# Patient Record
Sex: FEMALE | Race: WHITE | Employment: OTHER | URBAN - METROPOLITAN AREA
[De-identification: names, ages, dates, MRNs, and addresses within clinical notes are randomized per-mention and may not be internally consistent; named-entity substitution may affect disease eponyms.]

---

## 2017-03-16 PROBLEM — M79.604 PAIN OF RIGHT LOWER EXTREMITY: Status: ACTIVE | Noted: 2017-03-16

## 2017-03-16 PROBLEM — M17.0 PRIMARY OSTEOARTHRITIS OF BOTH KNEES: Status: ACTIVE | Noted: 2017-03-16

## 2017-03-16 PROBLEM — S70.11XA HEMATOMA OF RIGHT THIGH: Status: ACTIVE | Noted: 2017-03-16

## 2017-03-16 PROBLEM — R52 UNCONTROLLED PAIN: Status: ACTIVE | Noted: 2017-03-16

## 2019-10-25 ENCOUNTER — HOSPITAL ENCOUNTER (EMERGENCY)
Age: 79
Discharge: HOME | End: 2019-10-25
Attending: EMERGENCY MEDICINE
Payer: MEDICARE

## 2019-10-25 ENCOUNTER — APPOINTMENT (OUTPATIENT)
Dept: CT IMAGING | Age: 79
End: 2019-10-25
Payer: MEDICARE

## 2019-10-25 ENCOUNTER — APPOINTMENT (OUTPATIENT)
Dept: GENERAL RADIOLOGY | Age: 79
End: 2019-10-25
Payer: MEDICARE

## 2019-10-25 ENCOUNTER — APPOINTMENT (OUTPATIENT)
Dept: ULTRASOUND IMAGING | Age: 79
End: 2019-10-25
Payer: MEDICARE

## 2019-10-25 VITALS
OXYGEN SATURATION: 98 % | TEMPERATURE: 98.9 F | HEIGHT: 66 IN | HEART RATE: 92 BPM | BODY MASS INDEX: 25.71 KG/M2 | RESPIRATION RATE: 27 BRPM | SYSTOLIC BLOOD PRESSURE: 136 MMHG | WEIGHT: 160 LBS | DIASTOLIC BLOOD PRESSURE: 73 MMHG

## 2019-10-25 DIAGNOSIS — R10.30 LOWER ABDOMINAL PAIN: Primary | ICD-10-CM

## 2019-10-25 LAB
ALBUMIN SERPL-MCNC: 3.8 GM/DL (ref 3.4–5)
ALP BLD-CCNC: 117 IU/L (ref 40–128)
ALT SERPL-CCNC: 18 U/L (ref 10–40)
ANION GAP SERPL CALCULATED.3IONS-SCNC: 12 MMOL/L (ref 4–16)
AST SERPL-CCNC: 34 IU/L (ref 15–37)
BACTERIA: ABNORMAL /HPF
BASOPHILS ABSOLUTE: 0 K/CU MM
BASOPHILS RELATIVE PERCENT: 0.8 % (ref 0–1)
BILIRUB SERPL-MCNC: 1.5 MG/DL (ref 0–1)
BILIRUBIN URINE: NEGATIVE MG/DL
BLOOD, URINE: ABNORMAL
BUN BLDV-MCNC: 6 MG/DL (ref 6–23)
CALCIUM SERPL-MCNC: 9 MG/DL (ref 8.3–10.6)
CHLORIDE BLD-SCNC: 104 MMOL/L (ref 99–110)
CLARITY: ABNORMAL
CO2: 25 MMOL/L (ref 21–32)
COLOR: YELLOW
CREAT SERPL-MCNC: 0.7 MG/DL (ref 0.6–1.1)
DIFFERENTIAL TYPE: ABNORMAL
EOSINOPHILS ABSOLUTE: 0.1 K/CU MM
EOSINOPHILS RELATIVE PERCENT: 3.5 % (ref 0–3)
GFR AFRICAN AMERICAN: >60 ML/MIN/1.73M2
GFR NON-AFRICAN AMERICAN: >60 ML/MIN/1.73M2
GLUCOSE BLD-MCNC: 144 MG/DL (ref 70–99)
GLUCOSE, URINE: NEGATIVE MG/DL
HCT VFR BLD CALC: 39 % (ref 37–47)
HEMOGLOBIN: 12.3 GM/DL (ref 12.5–16)
IMMATURE NEUTROPHIL %: 0.4 % (ref 0–0.43)
KETONES, URINE: NEGATIVE MG/DL
LACTATE: 1.2 MMOL/L (ref 0.4–2)
LEUKOCYTE ESTERASE, URINE: ABNORMAL
LIPASE: 9 IU/L (ref 13–60)
LYMPHOCYTES ABSOLUTE: 0.5 K/CU MM
LYMPHOCYTES RELATIVE PERCENT: 21.3 % (ref 24–44)
MAGNESIUM: 2.2 MG/DL (ref 1.8–2.4)
MCH RBC QN AUTO: 30.1 PG (ref 27–31)
MCHC RBC AUTO-ENTMCNC: 31.5 % (ref 32–36)
MCV RBC AUTO: 95.6 FL (ref 78–100)
MONOCYTES ABSOLUTE: 0.3 K/CU MM
MONOCYTES RELATIVE PERCENT: 13 % (ref 0–4)
MUCUS: ABNORMAL HPF
NITRITE URINE, QUANTITATIVE: NEGATIVE
NUCLEATED RBC %: 0 %
PDW BLD-RTO: 14.7 % (ref 11.7–14.9)
PH, URINE: 7 (ref 5–8)
PLATELET # BLD: 108 K/CU MM (ref 140–440)
PMV BLD AUTO: 9.9 FL (ref 7.5–11.1)
POTASSIUM SERPL-SCNC: 3.2 MMOL/L (ref 3.5–5.1)
PRO-BNP: 593.8 PG/ML
PROTEIN UA: NEGATIVE MG/DL
RBC # BLD: 4.08 M/CU MM (ref 4.2–5.4)
RBC URINE: 5 /HPF (ref 0–6)
SEGMENTED NEUTROPHILS ABSOLUTE COUNT: 1.6 K/CU MM
SEGMENTED NEUTROPHILS RELATIVE PERCENT: 61 % (ref 36–66)
SODIUM BLD-SCNC: 141 MMOL/L (ref 135–145)
SPECIFIC GRAVITY UA: 1 (ref 1–1.03)
TOTAL IMMATURE NEUTOROPHIL: 0.01 K/CU MM
TOTAL NUCLEATED RBC: 0 K/CU MM
TOTAL PROTEIN: 6.6 GM/DL (ref 6.4–8.2)
TRICHOMONAS: ABNORMAL /HPF
TROPONIN T: <0.01 NG/ML
UROBILINOGEN, URINE: NORMAL MG/DL (ref 0.2–1)
WBC # BLD: 2.5 K/CU MM (ref 4–10.5)
WBC CLUMP: ABNORMAL /HPF
WBC UA: 22 /HPF (ref 0–5)

## 2019-10-25 PROCEDURE — 83735 ASSAY OF MAGNESIUM: CPT

## 2019-10-25 PROCEDURE — 76705 ECHO EXAM OF ABDOMEN: CPT

## 2019-10-25 PROCEDURE — 83880 ASSAY OF NATRIURETIC PEPTIDE: CPT

## 2019-10-25 PROCEDURE — 93005 ELECTROCARDIOGRAM TRACING: CPT | Performed by: EMERGENCY MEDICINE

## 2019-10-25 PROCEDURE — 87077 CULTURE AEROBIC IDENTIFY: CPT

## 2019-10-25 PROCEDURE — 2580000003 HC RX 258: Performed by: EMERGENCY MEDICINE

## 2019-10-25 PROCEDURE — 99285 EMERGENCY DEPT VISIT HI MDM: CPT

## 2019-10-25 PROCEDURE — 6360000004 HC RX CONTRAST MEDICATION: Performed by: EMERGENCY MEDICINE

## 2019-10-25 PROCEDURE — 87086 URINE CULTURE/COLONY COUNT: CPT

## 2019-10-25 PROCEDURE — 74177 CT ABD & PELVIS W/CONTRAST: CPT

## 2019-10-25 PROCEDURE — 80053 COMPREHEN METABOLIC PANEL: CPT

## 2019-10-25 PROCEDURE — 85025 COMPLETE CBC W/AUTO DIFF WBC: CPT

## 2019-10-25 PROCEDURE — 84484 ASSAY OF TROPONIN QUANT: CPT

## 2019-10-25 PROCEDURE — 81001 URINALYSIS AUTO W/SCOPE: CPT

## 2019-10-25 PROCEDURE — 93010 ELECTROCARDIOGRAM REPORT: CPT | Performed by: INTERNAL MEDICINE

## 2019-10-25 PROCEDURE — 87186 SC STD MICRODIL/AGAR DIL: CPT

## 2019-10-25 PROCEDURE — 71046 X-RAY EXAM CHEST 2 VIEWS: CPT

## 2019-10-25 PROCEDURE — 83690 ASSAY OF LIPASE: CPT

## 2019-10-25 PROCEDURE — 6370000000 HC RX 637 (ALT 250 FOR IP): Performed by: EMERGENCY MEDICINE

## 2019-10-25 PROCEDURE — 83605 ASSAY OF LACTIC ACID: CPT

## 2019-10-25 RX ORDER — SIMVASTATIN 40 MG
40 TABLET ORAL NIGHTLY
COMMUNITY
End: 2021-08-02

## 2019-10-25 RX ORDER — TRAZODONE HYDROCHLORIDE 50 MG/1
75-100 TABLET ORAL NIGHTLY
Status: ON HOLD | COMMUNITY
End: 2020-02-27 | Stop reason: HOSPADM

## 2019-10-25 RX ORDER — CEPHALEXIN 500 MG/1
500 CAPSULE ORAL 4 TIMES DAILY
Qty: 28 CAPSULE | Refills: 0 | Status: SHIPPED | OUTPATIENT
Start: 2019-10-25 | End: 2019-11-01

## 2019-10-25 RX ORDER — SODIUM CHLORIDE 0.9 % (FLUSH) 0.9 %
10 SYRINGE (ML) INJECTION PRN
Status: DISCONTINUED | OUTPATIENT
Start: 2019-10-25 | End: 2019-10-25 | Stop reason: HOSPADM

## 2019-10-25 RX ORDER — CEPHALEXIN 250 MG/1
500 CAPSULE ORAL ONCE
Status: COMPLETED | OUTPATIENT
Start: 2019-10-25 | End: 2019-10-25

## 2019-10-25 RX ORDER — ALPRAZOLAM 0.25 MG/1
0.5 TABLET ORAL ONCE
Status: COMPLETED | OUTPATIENT
Start: 2019-10-25 | End: 2019-10-25

## 2019-10-25 RX ADMIN — CEPHALEXIN 500 MG: 250 CAPSULE ORAL at 15:41

## 2019-10-25 RX ADMIN — Medication 10 ML: at 11:08

## 2019-10-25 RX ADMIN — ALPRAZOLAM 0.5 MG: 0.25 TABLET ORAL at 13:32

## 2019-10-25 RX ADMIN — IOPAMIDOL 75 ML: 755 INJECTION, SOLUTION INTRAVENOUS at 11:08

## 2019-10-25 ASSESSMENT — PAIN DESCRIPTION - PAIN TYPE: TYPE: ACUTE PAIN

## 2019-10-25 ASSESSMENT — PAIN SCALES - GENERAL: PAINLEVEL_OUTOF10: 6

## 2019-10-25 ASSESSMENT — PAIN DESCRIPTION - LOCATION: LOCATION: ABDOMEN

## 2019-10-27 LAB
CULTURE: ABNORMAL
Lab: ABNORMAL
SPECIMEN: ABNORMAL
TOTAL COLONY COUNT: ABNORMAL

## 2019-10-29 LAB
EKG ATRIAL RATE: 66 BPM
EKG DIAGNOSIS: NORMAL
EKG P AXIS: -3 DEGREES
EKG P-R INTERVAL: 140 MS
EKG Q-T INTERVAL: 446 MS
EKG QRS DURATION: 92 MS
EKG QTC CALCULATION (BAZETT): 467 MS
EKG R AXIS: 25 DEGREES
EKG T AXIS: 72 DEGREES
EKG VENTRICULAR RATE: 66 BPM

## 2020-02-21 ENCOUNTER — APPOINTMENT (OUTPATIENT)
Dept: CT IMAGING | Age: 80
DRG: 392 | End: 2020-02-21
Payer: MEDICARE

## 2020-02-21 ENCOUNTER — APPOINTMENT (OUTPATIENT)
Dept: GENERAL RADIOLOGY | Age: 80
DRG: 392 | End: 2020-02-21
Payer: MEDICARE

## 2020-02-21 ENCOUNTER — APPOINTMENT (OUTPATIENT)
Dept: ULTRASOUND IMAGING | Age: 80
DRG: 392 | End: 2020-02-21
Payer: MEDICARE

## 2020-02-21 ENCOUNTER — HOSPITAL ENCOUNTER (INPATIENT)
Age: 80
LOS: 3 days | Discharge: HOME HEALTH | DRG: 392 | End: 2020-02-27
Attending: HOSPITALIST | Admitting: HOSPITALIST
Payer: MEDICARE

## 2020-02-21 PROBLEM — R07.9 CHEST PAIN: Status: ACTIVE | Noted: 2020-02-21

## 2020-02-21 LAB
ALBUMIN SERPL-MCNC: 3.3 GM/DL (ref 3.4–5)
ALP BLD-CCNC: 176 IU/L (ref 40–129)
ALT SERPL-CCNC: 13 U/L (ref 10–40)
ANION GAP SERPL CALCULATED.3IONS-SCNC: 9 MMOL/L (ref 4–16)
AST SERPL-CCNC: 26 IU/L (ref 15–37)
BACTERIA: NEGATIVE /HPF
BASOPHILS ABSOLUTE: 0 K/CU MM
BASOPHILS RELATIVE PERCENT: 0.5 % (ref 0–1)
BILIRUB SERPL-MCNC: 0.7 MG/DL (ref 0–1)
BILIRUBIN URINE: NEGATIVE MG/DL
BLOOD, URINE: NEGATIVE
BUN BLDV-MCNC: 11 MG/DL (ref 6–23)
CALCIUM SERPL-MCNC: 8.5 MG/DL (ref 8.3–10.6)
CHLORIDE BLD-SCNC: 99 MMOL/L (ref 99–110)
CLARITY: CLEAR
CO2: 27 MMOL/L (ref 21–32)
COLOR: YELLOW
CREAT SERPL-MCNC: 0.9 MG/DL (ref 0.6–1.1)
DIFFERENTIAL TYPE: ABNORMAL
EKG ATRIAL RATE: 62 BPM
EKG DIAGNOSIS: NORMAL
EKG P AXIS: 34 DEGREES
EKG P-R INTERVAL: 184 MS
EKG Q-T INTERVAL: 450 MS
EKG QRS DURATION: 92 MS
EKG QTC CALCULATION (BAZETT): 456 MS
EKG R AXIS: -13 DEGREES
EKG T AXIS: 42 DEGREES
EKG VENTRICULAR RATE: 62 BPM
EOSINOPHILS ABSOLUTE: 0.1 K/CU MM
EOSINOPHILS RELATIVE PERCENT: 2.8 % (ref 0–3)
GFR AFRICAN AMERICAN: >60 ML/MIN/1.73M2
GFR NON-AFRICAN AMERICAN: >60 ML/MIN/1.73M2
GLUCOSE BLD-MCNC: 107 MG/DL (ref 70–99)
GLUCOSE BLD-MCNC: 99 MG/DL (ref 70–99)
GLUCOSE, URINE: NEGATIVE MG/DL
HCT VFR BLD CALC: 36.5 % (ref 37–47)
HEMOGLOBIN: 11.7 GM/DL (ref 12.5–16)
IMMATURE NEUTROPHIL %: 0.5 % (ref 0–0.43)
KETONES, URINE: NEGATIVE MG/DL
LEUKOCYTE ESTERASE, URINE: ABNORMAL
LIPASE: 12 IU/L (ref 13–60)
LYMPHOCYTES ABSOLUTE: 0.5 K/CU MM
LYMPHOCYTES RELATIVE PERCENT: 21.7 % (ref 24–44)
MAGNESIUM: 2.1 MG/DL (ref 1.8–2.4)
MCH RBC QN AUTO: 29.7 PG (ref 27–31)
MCHC RBC AUTO-ENTMCNC: 32.1 % (ref 32–36)
MCV RBC AUTO: 92.6 FL (ref 78–100)
MONOCYTES ABSOLUTE: 0.3 K/CU MM
MONOCYTES RELATIVE PERCENT: 11.8 % (ref 0–4)
NITRITE URINE, QUANTITATIVE: NEGATIVE
NON SQUAM EPI CELLS: <1 /HPF
NUCLEATED RBC %: 0 %
PDW BLD-RTO: 14.3 % (ref 11.7–14.9)
PH, URINE: 7 (ref 5–8)
PLATELET # BLD: 112 K/CU MM (ref 140–440)
PMV BLD AUTO: 9.6 FL (ref 7.5–11.1)
POTASSIUM SERPL-SCNC: 3.6 MMOL/L (ref 3.5–5.1)
PRO-BNP: 511.3 PG/ML
PROTEIN UA: NEGATIVE MG/DL
RBC # BLD: 3.94 M/CU MM (ref 4.2–5.4)
RBC URINE: <1 /HPF (ref 0–6)
SEGMENTED NEUTROPHILS ABSOLUTE COUNT: 1.3 K/CU MM
SEGMENTED NEUTROPHILS RELATIVE PERCENT: 62.7 % (ref 36–66)
SODIUM BLD-SCNC: 135 MMOL/L (ref 135–145)
SPECIFIC GRAVITY UA: 1.01 (ref 1–1.03)
SQUAMOUS EPITHELIAL: <1 /HPF
TOTAL IMMATURE NEUTOROPHIL: 0.01 K/CU MM
TOTAL NUCLEATED RBC: 0 K/CU MM
TOTAL PROTEIN: 6 GM/DL (ref 6.4–8.2)
TRICHOMONAS: ABNORMAL /HPF
TROPONIN T: <0.01 NG/ML
TROPONIN T: <0.01 NG/ML
UROBILINOGEN, URINE: 1 MG/DL (ref 0.2–1)
WBC # BLD: 2.1 K/CU MM (ref 4–10.5)
WBC UA: 1 /HPF (ref 0–5)

## 2020-02-21 PROCEDURE — 71045 X-RAY EXAM CHEST 1 VIEW: CPT

## 2020-02-21 PROCEDURE — 85025 COMPLETE CBC W/AUTO DIFF WBC: CPT

## 2020-02-21 PROCEDURE — 83735 ASSAY OF MAGNESIUM: CPT

## 2020-02-21 PROCEDURE — 83690 ASSAY OF LIPASE: CPT

## 2020-02-21 PROCEDURE — G0378 HOSPITAL OBSERVATION PER HR: HCPCS

## 2020-02-21 PROCEDURE — 76705 ECHO EXAM OF ABDOMEN: CPT

## 2020-02-21 PROCEDURE — 96375 TX/PRO/DX INJ NEW DRUG ADDON: CPT

## 2020-02-21 PROCEDURE — 6360000002 HC RX W HCPCS: Performed by: PHYSICIAN ASSISTANT

## 2020-02-21 PROCEDURE — 83880 ASSAY OF NATRIURETIC PEPTIDE: CPT

## 2020-02-21 PROCEDURE — 76376 3D RENDER W/INTRP POSTPROCES: CPT

## 2020-02-21 PROCEDURE — 2580000003 HC RX 258: Performed by: PHYSICIAN ASSISTANT

## 2020-02-21 PROCEDURE — 6360000002 HC RX W HCPCS: Performed by: NURSE PRACTITIONER

## 2020-02-21 PROCEDURE — 82962 GLUCOSE BLOOD TEST: CPT

## 2020-02-21 PROCEDURE — 96372 THER/PROPH/DIAG INJ SC/IM: CPT

## 2020-02-21 PROCEDURE — 6370000000 HC RX 637 (ALT 250 FOR IP): Performed by: NURSE PRACTITIONER

## 2020-02-21 PROCEDURE — 80053 COMPREHEN METABOLIC PANEL: CPT

## 2020-02-21 PROCEDURE — 99285 EMERGENCY DEPT VISIT HI MDM: CPT

## 2020-02-21 PROCEDURE — 70450 CT HEAD/BRAIN W/O DYE: CPT

## 2020-02-21 PROCEDURE — 6360000004 HC RX CONTRAST MEDICATION: Performed by: PHYSICIAN ASSISTANT

## 2020-02-21 PROCEDURE — 84484 ASSAY OF TROPONIN QUANT: CPT

## 2020-02-21 PROCEDURE — 2500000003 HC RX 250 WO HCPCS: Performed by: PHYSICIAN ASSISTANT

## 2020-02-21 PROCEDURE — 36415 COLL VENOUS BLD VENIPUNCTURE: CPT

## 2020-02-21 PROCEDURE — 74177 CT ABD & PELVIS W/CONTRAST: CPT

## 2020-02-21 PROCEDURE — 81001 URINALYSIS AUTO W/SCOPE: CPT

## 2020-02-21 PROCEDURE — 2580000003 HC RX 258: Performed by: NURSE PRACTITIONER

## 2020-02-21 PROCEDURE — 96374 THER/PROPH/DIAG INJ IV PUSH: CPT

## 2020-02-21 RX ORDER — POLYETHYLENE GLYCOL 3350 17 G/17G
17 POWDER, FOR SOLUTION ORAL DAILY PRN
Status: DISCONTINUED | OUTPATIENT
Start: 2020-02-21 | End: 2020-02-27 | Stop reason: HOSPADM

## 2020-02-21 RX ORDER — ATORVASTATIN CALCIUM 20 MG/1
20 TABLET, FILM COATED ORAL NIGHTLY
Status: DISCONTINUED | OUTPATIENT
Start: 2020-02-21 | End: 2020-02-27 | Stop reason: HOSPADM

## 2020-02-21 RX ORDER — 0.9 % SODIUM CHLORIDE 0.9 %
500 INTRAVENOUS SOLUTION INTRAVENOUS ONCE
Status: COMPLETED | OUTPATIENT
Start: 2020-02-21 | End: 2020-02-21

## 2020-02-21 RX ORDER — BUSPIRONE HYDROCHLORIDE 15 MG/1
15 TABLET ORAL 2 TIMES DAILY
Status: DISCONTINUED | OUTPATIENT
Start: 2020-02-21 | End: 2020-02-26

## 2020-02-21 RX ORDER — ALPRAZOLAM 0.5 MG/1
0.5 TABLET ORAL 3 TIMES DAILY PRN
Status: DISCONTINUED | OUTPATIENT
Start: 2020-02-21 | End: 2020-02-26

## 2020-02-21 RX ORDER — ASPIRIN 81 MG/1
81 TABLET ORAL DAILY
Status: DISCONTINUED | OUTPATIENT
Start: 2020-02-21 | End: 2020-02-27 | Stop reason: HOSPADM

## 2020-02-21 RX ORDER — SODIUM CHLORIDE 0.9 % (FLUSH) 0.9 %
10 SYRINGE (ML) INJECTION EVERY 12 HOURS SCHEDULED
Status: DISCONTINUED | OUTPATIENT
Start: 2020-02-21 | End: 2020-02-26 | Stop reason: SDUPTHER

## 2020-02-21 RX ORDER — LOSARTAN POTASSIUM 50 MG/1
50 TABLET ORAL DAILY
Status: DISCONTINUED | OUTPATIENT
Start: 2020-02-21 | End: 2020-02-27 | Stop reason: HOSPADM

## 2020-02-21 RX ORDER — DEXTROSE MONOHYDRATE 25 G/50ML
12.5 INJECTION, SOLUTION INTRAVENOUS PRN
Status: DISCONTINUED | OUTPATIENT
Start: 2020-02-21 | End: 2020-02-27 | Stop reason: HOSPADM

## 2020-02-21 RX ORDER — PROMETHAZINE HYDROCHLORIDE 25 MG/1
12.5 TABLET ORAL EVERY 6 HOURS PRN
Status: DISCONTINUED | OUTPATIENT
Start: 2020-02-21 | End: 2020-02-27 | Stop reason: HOSPADM

## 2020-02-21 RX ORDER — NICOTINE POLACRILEX 4 MG
15 LOZENGE BUCCAL PRN
Status: DISCONTINUED | OUTPATIENT
Start: 2020-02-21 | End: 2020-02-27 | Stop reason: HOSPADM

## 2020-02-21 RX ORDER — SODIUM CHLORIDE 0.9 % (FLUSH) 0.9 %
10 SYRINGE (ML) INJECTION
Status: COMPLETED | OUTPATIENT
Start: 2020-02-21 | End: 2020-02-21

## 2020-02-21 RX ORDER — ACETAMINOPHEN 325 MG/1
650 TABLET ORAL EVERY 6 HOURS PRN
Status: DISCONTINUED | OUTPATIENT
Start: 2020-02-21 | End: 2020-02-26 | Stop reason: SDUPTHER

## 2020-02-21 RX ORDER — TRAZODONE HYDROCHLORIDE 50 MG/1
100 TABLET ORAL NIGHTLY
Status: DISCONTINUED | OUTPATIENT
Start: 2020-02-21 | End: 2020-02-26

## 2020-02-21 RX ORDER — ACETAMINOPHEN 650 MG/1
650 SUPPOSITORY RECTAL EVERY 6 HOURS PRN
Status: DISCONTINUED | OUTPATIENT
Start: 2020-02-21 | End: 2020-02-27 | Stop reason: HOSPADM

## 2020-02-21 RX ORDER — MIRTAZAPINE 15 MG/1
15 TABLET, FILM COATED ORAL NIGHTLY
Status: DISCONTINUED | OUTPATIENT
Start: 2020-02-21 | End: 2020-02-26

## 2020-02-21 RX ORDER — ESCITALOPRAM OXALATE 10 MG/1
20 TABLET ORAL DAILY
Status: DISCONTINUED | OUTPATIENT
Start: 2020-02-21 | End: 2020-02-27 | Stop reason: HOSPADM

## 2020-02-21 RX ORDER — DEXTROSE MONOHYDRATE 50 MG/ML
100 INJECTION, SOLUTION INTRAVENOUS PRN
Status: DISCONTINUED | OUTPATIENT
Start: 2020-02-21 | End: 2020-02-27 | Stop reason: HOSPADM

## 2020-02-21 RX ORDER — ONDANSETRON 2 MG/ML
4 INJECTION INTRAMUSCULAR; INTRAVENOUS EVERY 6 HOURS PRN
Status: DISCONTINUED | OUTPATIENT
Start: 2020-02-21 | End: 2020-02-27 | Stop reason: HOSPADM

## 2020-02-21 RX ORDER — CARVEDILOL 25 MG/1
25 TABLET ORAL 2 TIMES DAILY WITH MEALS
Status: DISCONTINUED | OUTPATIENT
Start: 2020-02-21 | End: 2020-02-27 | Stop reason: HOSPADM

## 2020-02-21 RX ORDER — SODIUM CHLORIDE 0.9 % (FLUSH) 0.9 %
10 SYRINGE (ML) INJECTION PRN
Status: DISCONTINUED | OUTPATIENT
Start: 2020-02-21 | End: 2020-02-26 | Stop reason: SDUPTHER

## 2020-02-21 RX ORDER — OMEPRAZOLE 20 MG/1
20 CAPSULE, DELAYED RELEASE ORAL DAILY
Status: ON HOLD | COMMUNITY
End: 2020-05-27 | Stop reason: HOSPADM

## 2020-02-21 RX ORDER — ONDANSETRON 2 MG/ML
4 INJECTION INTRAMUSCULAR; INTRAVENOUS EVERY 30 MIN PRN
Status: DISCONTINUED | OUTPATIENT
Start: 2020-02-21 | End: 2020-02-27 | Stop reason: HOSPADM

## 2020-02-21 RX ORDER — PANTOPRAZOLE SODIUM 40 MG/1
40 TABLET, DELAYED RELEASE ORAL
Status: DISCONTINUED | OUTPATIENT
Start: 2020-02-22 | End: 2020-02-27 | Stop reason: HOSPADM

## 2020-02-21 RX ADMIN — BUSPIRONE HYDROCHLORIDE 15 MG: 5 TABLET ORAL at 21:40

## 2020-02-21 RX ADMIN — ENOXAPARIN SODIUM 40 MG: 40 INJECTION SUBCUTANEOUS at 21:40

## 2020-02-21 RX ADMIN — SODIUM CHLORIDE, PRESERVATIVE FREE 10 ML: 5 INJECTION INTRAVENOUS at 21:41

## 2020-02-21 RX ADMIN — TRAZODONE HYDROCHLORIDE 100 MG: 50 TABLET ORAL at 21:40

## 2020-02-21 RX ADMIN — ALPRAZOLAM 0.5 MG: 0.5 TABLET ORAL at 21:39

## 2020-02-21 RX ADMIN — CARVEDILOL 25 MG: 25 TABLET, FILM COATED ORAL at 21:39

## 2020-02-21 RX ADMIN — SODIUM CHLORIDE 500 ML: 9 INJECTION, SOLUTION INTRAVENOUS at 16:08

## 2020-02-21 RX ADMIN — ONDANSETRON 4 MG: 2 INJECTION INTRAMUSCULAR; INTRAVENOUS at 16:08

## 2020-02-21 RX ADMIN — FAMOTIDINE 20 MG: 10 INJECTION, SOLUTION INTRAVENOUS at 16:08

## 2020-02-21 RX ADMIN — Medication 10 ML: at 16:05

## 2020-02-21 RX ADMIN — MIRTAZAPINE 15 MG: 15 TABLET, FILM COATED ORAL at 21:39

## 2020-02-21 RX ADMIN — ATORVASTATIN CALCIUM 20 MG: 20 TABLET, FILM COATED ORAL at 21:40

## 2020-02-21 RX ADMIN — IOPAMIDOL 75 ML: 755 INJECTION, SOLUTION INTRAVENOUS at 16:05

## 2020-02-21 ASSESSMENT — PAIN SCALES - GENERAL: PAINLEVEL_OUTOF10: 0

## 2020-02-21 NOTE — ED NOTES
The patient was visited by a relative and by home health care today, who called the patient's PCP. Her doctor suggested they call 911.      Adelita Laguna RN  02/21/20 7502

## 2020-02-21 NOTE — PROGRESS NOTES
Medication History  [facility]    Patient Name: Rachel Pimentel 1940     Medication history has been completed by: Mahnaz Figueroa CPhT    Source(s) of information: Insurance claims and Patient    Primary Care Physician: No primary care provider on file. Pharmacy: [facility]    Allergies as of 02/21/2020 - Review Complete 02/21/2020   Allergen Reaction Noted    Chocolate Other (See Comments) 04/18/2016    Nuts [peanut-containing drug products] Other (See Comments) 04/18/2016        Prior to Admission medications    Medication Sig Start Date End Date Taking? Authorizing Provider   omeprazole (PRILOSEC) 20 MG delayed release capsule Take 20 mg by mouth daily    Historical Provider, MD   traZODone (DESYREL) 50 MG tablet Take  mg by mouth nightly     Historical Provider, MD   ALPRAZolam (XANAX) 0.5 MG tablet Take 0.5 mg by mouth 3 times daily as needed for Anxiety. Historical Provider, MD   escitalopram (LEXAPRO) 20 MG tablet Take 20 mg by mouth daily    Historical Provider, MD   aspirin 81 MG EC tablet Take 81 mg by mouth daily    Historical Provider, MD       Medications added or changed (ex.  new medication, dosage change, interval change, formulation change):  Trazodone dose changed to  mg hs from 50 mg hs per claims  Omeprazole new medication per claims    Medications removed from list (include reason, ex. noncompliance, medication cost, therapy complete etc.):   Buspar no recent claims  Coreg no recent claims  Farxiga no claims  Losartan no recent claims  Mirtazapine no recent claims  Simvastatin no recent claims  Janumet XR no recent claims    Comments:  Patient is a poor historian and no family at bedside  Patient states she thinks she takes about 3 different medications daily  Entered last fill dates for all medications    To my knowledge the above medication history is accurate as of 2/21/2020 5:43 PM.   Mahnaz Figueroa CPhT   2/21/2020 5:43 PM

## 2020-02-21 NOTE — ED NOTES
Ambulated patient in the cantu. Patient stated she felt dizzy while walking.       Rosario Rossi RN  02/21/20 [id number]

## 2020-02-21 NOTE — ED PROVIDER NOTES
EMERGENCY DEPARTMENT ENCOUNTER      PCP: No primary care provider on file. CHIEF COMPLAINT    Chief Complaint   Patient presents with    Shortness of Breath    Irregular Heart Beat       HPI    Angelica Garza is a 78 y.o. female who presents to the emergency department today being sent in from home health nursing for concern of an abnormal heart rhythm. States that had readings of bradycardia at home in the mid 30s. Patient is complaining of epigastrium discomfort radiating into the chest.  Has had intermittent shortness of breath. Ongoing dizziness, which is not new for her. Also states that she been having lower back pain since sustaining a fall last week. She states that she fell backward landing on her back, denies hitting her head. Denies being on blood thinning medication. Patient seen several months ago and had abnormal CT of abdomen pelvis showing possible liver/gallbladder pathology. Patient does admit that she is had some nausea and the epigastric pain associated with food intake. Denies significant cardiac history. No history of blood clots. Not on blood thinning medication    REVIEW OF SYSTEMS    At least 10 systems reviewed and otherwise acutely negative except as in the [address] See HPI and nursing notes for additional information     PAST MEDICAL AND SURGICAL HISTORY    Past Medical History:   Diagnosis Date    Anxiety     Cellulitis     foot.      Constipation     Depression     Diabetes mellitus ([facility]     Hyperlipidemia     Hypertension     Insomnia     Rosacea     Sinus tachycardia      Past Surgical History:   Procedure Laterality Date    BACK SURGERY      HYSTERECTOMY      TOE SURGERY         CURRENT MEDICATIONS    Current Outpatient Rx   Medication Sig Dispense Refill    omeprazole (PRILOSEC) 20 MG delayed release capsule Take 20 mg by mouth daily      simvastatin (ZOCOR) 40 MG tablet Take 40 mg by mouth nightly      traZODone (DESYREL) 50 MG tablet Take  mg by mouth nightly       losartan (COZAAR) 50 MG tablet Take 1 tablet by mouth daily 30 tablet 0    carvedilol (COREG) 25 MG tablet Take 1 tablet by mouth 2 times daily (with meals) 60 tablet 0    mirtazapine (REMERON) 15 MG tablet Take 1 tablet by mouth nightly 30 tablet 0    ALPRAZolam (XANAX) 0.5 MG tablet Take 0.5 mg by mouth 3 times daily as needed for Anxiety.        dapagliflozin (FARXIGA) 10 MG tablet Take 10 mg by mouth every morning      sitaGLIPtin-metFORMIN (JANUMET XR)  MG TB24 tablet Take 1 tablet by mouth 2 times daily      BUSPIRONE HCL PO Take 15 mg by mouth 2 times daily       escitalopram (LEXAPRO) 20 MG tablet Take 20 mg by mouth daily      aspirin 81 MG EC tablet Take 81 mg by mouth daily         ALLERGIES    Allergies   Allergen Reactions    Chocolate Other (See Comments)     Throbbing headache    Nuts [Peanut-Containing Drug Products] Other (See Comments)     \"makes my nose run and like i have a bad cold\"       SOCIAL AND FAMILY HISTORY    Social History     Socioeconomic History    Marital status:      Spouse name: None    Number of children: None    Years of education: None    Highest education level: None   Occupational History    None   Social Needs    Financial resource strain: None    Food insecurity:     Worry: None     Inability: None    Transportation needs:     Medical: None     Non-medical: None   Tobacco Use    Smoking status: Never Smoker    Smokeless tobacco: Never Used   Substance and Sexual Activity    Alcohol use: No    Drug use: No    Sexual activity: None   Lifestyle    Physical activity:     Days per week: None     Minutes per session: None    Stress: None   Relationships    Social connections:     Talks on phone: None     Gets together: None     Attends Zoroastrianism service: None     Active member of club or organization: None     Attends meetings of clubs or organizations: None     Relationship status: None    Intimate partner violence:     Fear of current or ex partner: None     Emotionally abused: None     Physically abused: None     Forced sexual activity: None   Other Topics Concern    None   Social History Narrative    None     History reviewed. No pertinent family history. PHYSICAL EXAM    VITAL SIGNS: BP (!) 166/67   Pulse 84   Temp 98.5 °F (36.9 °C) (Oral)   Resp 14   Ht 5' 6\" (1.676 m)   Wt 160 lb (72.6 kg)   SpO2 98%   BMI 25.82 kg/m²    Constitutional:  Well developed, Well nourished  HENT:  Normocephalic, Atraumatic, PERRL. EOMI. Sclera clear. Conjunctiva normal, No discharge. Neck/Lymphatics: supple, no JVD, no swollen nodes  Cardiovascular:  Rate regular, normal Rhythm,  no murmurs/rubs/gallops. No JVD  No carotid bruits or murmurs heard in carotids. Respiratory:  Nonlabored breathing. Normal breath sounds, No wheezing  Abdomen: Bowel sounds normal, Soft, No tenderness, no masses. Musculoskeletal:    There is no edema, asymmetry, or calf / thigh tenderness bilaterally. No cyanosis. No cool or pale-appearing limb. Distal cap refill and pulses intact bilateral upper and lower extremities  Bilateral upper and lower extremity ROM intact without pain or obvious deficit  Integument:  Warm, Dry  Neurologic: Alert & oriented , No focal deficits noted. Cranial nerves II through XII grossly intact. Normal gross motor coordination & motor strength bilateral upper and lower extremities  Sensation intact.   Psychiatric:  Affect normal, Mood normal.       Labs:  Results for orders placed or performed during the hospital encounter of 02/21/20   CBC auto diff   Result Value Ref Range    WBC 2.1 (L) 4.0 - 10.5 K/CU MM    RBC 3.94 (L) 4.2 - 5.4 M/CU MM    Hemoglobin 11.7 (L) 12.5 - 16.0 GM/DL    Hematocrit 36.5 (L) 37 - 47 %    MCV 92.6 78 - 100 FL    MCH 29.7 27 - 31 PG    MCHC 32.1 32.0 - 36.0 %    RDW 14.3 11.7 - 14.9 %    Platelets 668 (L) 075 - 440 K/CU MM    MPV 9.6 7.5 - 11.1 FL    Differential Type UA NONE SEEN NONE SEEN /HPF    non squam epi cells <1 /HPF   Lipase   Result Value Ref Range    Lipase 12 (L) 13 - 60 IU/L   EKG 12 Lead   Result Value Ref Range    Ventricular Rate 62 BPM    Atrial Rate 62 BPM    P-R Interval 184 ms    QRS Duration 92 ms    Q-T Interval 450 ms    QTc Calculation (Bazett) 456 ms    P Axis 34 degrees    R Axis -13 degrees    T Axis 42 degrees    Diagnosis       Normal sinus rhythm  Normal ECG  When compared with ECG of 25-OCT-2019 09:13,  premature ventricular complexes are no longer present         EKG    EKG 12 Lead   Result Value Ref Range    Ventricular Rate 62 BPM    Atrial Rate 62 BPM    P-R Interval 184 ms    QRS Duration 92 ms    Q-T Interval 450 ms    QTc Calculation (Bazett) 456 ms    P Axis 34 degrees    R Axis -13 degrees    T Axis 42 degrees    Diagnosis       Normal sinus rhythm  Normal ECG  When compared with ECG of 25-OCT-2019 09:13,  premature ventricular complexes are no longer present           RADIOLOGY    Ct Head Wo Contrast    Result Date: 2/21/2020  EXAMINATION: CT OF THE HEAD WITHOUT CONTRAST  2/21/2020 3:50 pm TECHNIQUE: CT of the head was performed without the administration of intravenous contrast. Dose modulation, iterative reconstruction, and/or weight based adjustment of the mA/kV was utilized to reduce the radiation dose to as low as reasonably achievable. COMPARISON: 01/25/2018 HISTORY: ORDERING SYSTEM PROVIDED HISTORY: fall one week ago TECHNOLOGIST PROVIDED HISTORY: Reason for exam:->fall one week ago Has a \"code stroke\" or \"stroke alert\" been called? ->No Reason for Exam: fall one week ago Acuity: Acute Type of Exam: Initial FINDINGS: BRAIN/VENTRICLES: There is no acute intracranial hemorrhage, mass effect or midline shift. No abnormal extra-axial fluid collection. The gray-white differentiation is maintained without evidence of an acute infarct. There is no evidence of hydrocephalus. There is stable age-appropriate atrophy.   There is stable mild for Exam: low back pain from fall 1 week ago Acuity: Acute Type of Exam: Initial FINDINGS: Lower Chest: Coronary artery atherosclerotic and aortic valvular calcifications. Distal paraesophageal varices are noted. Mild bibasilar atelectasis. Organs: Cirrhotic liver. The previously described irregular hepatic hypodensities are not clearly visualized. No suspicious focal hepatic abnormality. Small calcified gallstone. No CT evidence of acute cholecystitis or biliary ductal dilatation. Moderate pancreatic atrophy. Mild splenomegaly. The bilateral adrenal glands are unremarkable. The bilateral kidneys and ureters are unremarkable. GI/Bowel: Normal appendix. The colon is unremarkable. No bowel obstruction or abnormal bowel wall thickening. Pelvis: The urinary bladder is normal in appearance. Status post hysterectomy. Small volume of free fluid in the pelvis. No pelvic or inguinal lymphadenopathy. Peritoneum/Retroperitoneum: The abdominal aorta is normal in caliber with mild atherosclerotic vascular disease. Small amount of ascites. No pneumoperitoneum. No retroperitoneal or mesenteric lymphadenopathy. Bones/Soft Tissues: Chronic right posterior 8th through 10th rib fractures unchanged from 10/25/2019. Mild acute L2 superior endplate compression fracture new from 10/25/2019. There is approximately 30% loss of height. No retropulsion. The remaining vertebral body heights are preserved. 1. Acute mild L2 superior endplate compression fracture. No retropulsion. 2. No acute abnormality in the abdomen or pelvis. 3. Cirrhosis with a small volume of ascites and evidence of portal hypertension including mild splenomegaly and paraesophageal varices. No suspicious focal hepatic abnormality. 4. Cholelithiasis.      Xr Chest Portable    Result Date: 2/21/2020  EXAMINATION: ONE XRAY VIEW OF THE CHEST 2/21/2020 2:11 pm COMPARISON: 10/25/2019 HISTORY: ORDERING SYSTEM PROVIDED HISTORY: Chest pain TECHNOLOGIST PROVIDED HISTORY: Reason for exam:->Chest pain Reason for Exam: chest pain Acuity: Acute Type of Exam: Initial FINDINGS: Normal heart size and pulmonary vasculature. No focal consolidations, pleural effusions, or pneumothorax. No evidence of acute process     Us Abdomen Limited    Result Date: 2/21/2020  EXAMINATION: RIGHT UPPER QUADRANT ULTRASOUND 2/21/2020 3:01 pm COMPARISON: 10/25/2028, CT dated 10/25/2019 HISTORY: ORDERING SYSTEM PROVIDED HISTORY: epigastri c pain TECHNOLOGIST PROVIDED HISTORY: Reason for exam:->epigastri c pain Reason for Exam: abd pain Acuity: Acute Type of Exam: Initial FINDINGS: LIVER:  Liver demonstrates normal echogenicity without evidence of intrahepatic biliary ductal dilatation. Nodular contour of the liver is present. No focal masses. BILIARY SYSTEM:  Cholelithiasis and gallbladder sludge are identified. No pericholecystic fluid, wall thickening, or sonographic Taylor sign. Common bile duct is within normal limits measuring 3 mm. RIGHT KIDNEY: Right kidney is grossly unremarkable without evidence of hydronephrosis. Right kidney measures 9.3 cm in length. PANCREAS:  Visualized portions of the pancreas are unremarkable. OTHER: No evidence of right upper quadrant ascites. 1. Cholelithiasis and gallbladder sludge. No additional sonographic evidence of acute cholecystitis. Normal common bile duct. 2. Liver cirrhosis. No focal masses. Ct Lumbar Reconstruction Wo Post Process    Result Date: 2/21/2020  EXAMINATION: CT OF THE ABDOMEN AND PELVIS WITH CONTRAST; CT OF THE LUMBAR SPINE WITHOUT CONTRAST 2/21/2020 3:50 pm; 2/21/2020 4:02 pm TECHNIQUE: CT of the abdomen and pelvis was performed with the administration of intravenous contrast. Multiplanar reformatted images are provided for review.  Dose modulation, iterative reconstruction, and/or weight based adjustment of the mA/kV was utilized to reduce the radiation dose to as low as reasonably achievable.; CT of the lumbar spine was performed without the administration of intravenous contrast. Multiplanar reformatted images are provided for review. Dose modulation, iterative reconstruction, and/or weight based adjustment of the mA/kV was utilized to reduce the radiation dose to as low as reasonably achievable. COMPARISON: Abdominal ultrasound 02/21/2020. CT abdomen and pelvis 10/25/2019. HISTORY: ORDERING SYSTEM PROVIDED HISTORY: abdominal pain TECHNOLOGIST PROVIDED HISTORY: Reason for exam:->abdominal pain Additional Contrast?->None Reason for Exam: abdominal pain Acuity: Acute Type of Exam: Initial; ORDERING SYSTEM PROVIDED HISTORY: low back pain from fall 1 week ago TECHNOLOGIST PROVIDED HISTORY: Reason for exam:->low back pain from fall 1 week ago Reason for Exam: low back pain from fall 1 week ago Acuity: Acute Type of Exam: Initial FINDINGS: Lower Chest: Coronary artery atherosclerotic and aortic valvular calcifications. Distal paraesophageal varices are noted. Mild bibasilar atelectasis. Organs: Cirrhotic liver. The previously described irregular hepatic hypodensities are not clearly visualized. No suspicious focal hepatic abnormality. Small calcified gallstone. No CT evidence of acute cholecystitis or biliary ductal dilatation. Moderate pancreatic atrophy. Mild splenomegaly. The bilateral adrenal glands are unremarkable. The bilateral kidneys and ureters are unremarkable. GI/Bowel: Normal appendix. The colon is unremarkable. No bowel obstruction or abnormal bowel wall thickening. Pelvis: The urinary bladder is normal in appearance. Status post hysterectomy. Small volume of free fluid in the pelvis. No pelvic or inguinal lymphadenopathy. Peritoneum/Retroperitoneum: The abdominal aorta is normal in caliber with mild atherosclerotic vascular disease. Small amount of ascites. No pneumoperitoneum. No retroperitoneal or mesenteric lymphadenopathy.  Bones/Soft Tissues: Chronic right posterior 8th through 10th rib fractures unchanged from 10/25/2019. Mild acute L2 superior endplate compression fracture new from 10/25/2019. There is approximately 30% loss of height. No retropulsion. The remaining vertebral body heights are preserved. 1. Acute mild L2 superior endplate compression fracture. No retropulsion. 2. No acute abnormality in the abdomen or pelvis. 3. Cirrhosis with a small volume of ascites and evidence of portal hypertension including mild splenomegaly and paraesophageal varices. No suspicious focal hepatic abnormality. 4. Cholelithiasis. ED COURSE & MEDICAL DECISION MAKING     Patient presents as above. Emergent etiologies considered. Patient is her vital signs are stable,  placed on telemetry monitoring, throughout her ED course, no signs of bradycardia. No abnormal arrhythmias. Cardiac and broad work-up initiated secondary to presentation. EKG demonstrated no significant changes, no ischemic changes, no signs of life-threatening arrhythmias. Troponin and BNP within normal limits. Chest x-ray demonstrated no vascular congestion or signs of congestive heart failure. Electrolyte kidney function within normal limits. Did demonstrate a leukopenia of 2.1. Unsure of the exact etiology, no other anemia. Patient still complaining of epigastrium pain, secondary to abnormal CT imaging and fall we did obtain CT of the head, lumbar spine, abdomen pelvis. CT head was acutely negative for acute intracranial pathology. Abdomen did demonstrate mild cirrhosis without any underlying acute intra-abdominal pathology. Secondary to this we also obtained a right upper quadrant ultrasound which demonstrated no signs of acute cholecystitis or choledocholithiasis. Her lumbar spine demonstrated a acute L2 compression fracture which is likely secondary to her recent fall    Patient still not feeling well and secondary reevaluation. Still complaining of epigastrium discomfort.   Unsure of the exact etiology,

## 2020-02-21 NOTE — ED NOTES
Bed: ED-16  Expected date:   Expected time:   Means of arrival:   Comments:  [id number] Naveen Gross RN  02/21/20 [id number]

## 2020-02-22 LAB
ANION GAP SERPL CALCULATED.3IONS-SCNC: 9 MMOL/L (ref 4–16)
APTT: 37.7 SECONDS (ref 25.1–37.1)
BASOPHILS ABSOLUTE: 0.1 K/CU MM
BASOPHILS RELATIVE PERCENT: 2 % (ref 0–1)
BUN BLDV-MCNC: 12 MG/DL (ref 6–23)
CALCIUM SERPL-MCNC: 8.5 MG/DL (ref 8.3–10.6)
CHLORIDE BLD-SCNC: 103 MMOL/L (ref 99–110)
CHOLESTEROL: 158 MG/DL
CO2: 26 MMOL/L (ref 21–32)
CREAT SERPL-MCNC: 0.9 MG/DL (ref 0.6–1.1)
DIFFERENTIAL TYPE: ABNORMAL
EKG ATRIAL RATE: 65 BPM
EKG DIAGNOSIS: NORMAL
EKG P AXIS: 52 DEGREES
EKG P-R INTERVAL: 214 MS
EKG Q-T INTERVAL: 454 MS
EKG QRS DURATION: 94 MS
EKG QTC CALCULATION (BAZETT): 472 MS
EKG R AXIS: 6 DEGREES
EKG T AXIS: 44 DEGREES
EKG VENTRICULAR RATE: 65 BPM
ERYTHROCYTE SEDIMENTATION RATE: 16 MM/HR (ref 0–30)
FERRITIN: 34 NG/ML (ref 15–150)
FIBRINOGEN LEVEL: 283 MG/DL (ref 196.9–442.1)
FOLATE: 7.6 NG/ML (ref 3.1–17.5)
GFR AFRICAN AMERICAN: >60 ML/MIN/1.73M2
GFR NON-AFRICAN AMERICAN: >60 ML/MIN/1.73M2
GLUCOSE BLD-MCNC: 104 MG/DL (ref 70–99)
GLUCOSE BLD-MCNC: 206 MG/DL (ref 70–99)
GLUCOSE BLD-MCNC: 77 MG/DL (ref 70–99)
GLUCOSE BLD-MCNC: 91 MG/DL (ref 70–99)
GLUCOSE BLD-MCNC: 98 MG/DL (ref 70–99)
HCT VFR BLD CALC: 35.1 % (ref 37–47)
HCT VFR BLD CALC: 39.1 % (ref 37–47)
HDLC SERPL-MCNC: 48 MG/DL
HEMOGLOBIN: 11.1 GM/DL (ref 12.5–16)
HEMOGLOBIN: 12.3 GM/DL (ref 12.5–16)
INR BLD: 1.07 INDEX
IRON: 96 UG/DL (ref 37–145)
LACTATE DEHYDROGENASE: 216 IU/L (ref 120–246)
LDL CHOLESTEROL DIRECT: 108 MG/DL
LYMPHOCYTES ABSOLUTE: 0.8 K/CU MM
LYMPHOCYTES RELATIVE PERCENT: 27 % (ref 24–44)
MCH RBC QN AUTO: 29.6 PG (ref 27–31)
MCH RBC QN AUTO: 29.8 PG (ref 27–31)
MCHC RBC AUTO-ENTMCNC: 31.5 % (ref 32–36)
MCHC RBC AUTO-ENTMCNC: 31.6 % (ref 32–36)
MCV RBC AUTO: 94 FL (ref 78–100)
MCV RBC AUTO: 94.1 FL (ref 78–100)
MONOCYTES ABSOLUTE: 0.3 K/CU MM
MONOCYTES RELATIVE PERCENT: 9 % (ref 0–4)
PCT TRANSFERRIN: 29 % (ref 10–44)
PDW BLD-RTO: 14.3 % (ref 11.7–14.9)
PDW BLD-RTO: 14.6 % (ref 11.7–14.9)
PLATELET # BLD: 111 K/CU MM (ref 140–440)
PLATELET # BLD: 138 K/CU MM (ref 140–440)
PLT MORPHOLOGY: ABNORMAL
PMV BLD AUTO: 10.4 FL (ref 7.5–11.1)
PMV BLD AUTO: 10.9 FL (ref 7.5–11.1)
POTASSIUM SERPL-SCNC: 3.6 MMOL/L (ref 3.5–5.1)
PROTHROMBIN TIME: 13 SECONDS (ref 11.7–14.5)
RBC # BLD: 3.73 M/CU MM (ref 4.2–5.4)
RBC # BLD: 4.16 M/CU MM (ref 4.2–5.4)
RETICULOCYTE COUNT PCT: 1.5 % (ref 0.2–2.2)
SEGMENTED NEUTROPHILS ABSOLUTE COUNT: 1.8 K/CU MM
SEGMENTED NEUTROPHILS RELATIVE PERCENT: 62 % (ref 36–66)
SODIUM BLD-SCNC: 138 MMOL/L (ref 135–145)
TOTAL IRON BINDING CAPACITY: 332 UG/DL (ref 250–450)
TRIGL SERPL-MCNC: 90 MG/DL
TROPONIN T: <0.01 NG/ML
TSH HIGH SENSITIVITY: 1.97 UIU/ML (ref 0.27–4.2)
UNSATURATED IRON BINDING CAPACITY: 236 UG/DL (ref 110–370)
VITAMIN B-12: 475.7 PG/ML (ref 211–911)
WBC # BLD: 2.1 K/CU MM (ref 4–10.5)
WBC # BLD: 3 K/CU MM (ref 4–10.5)

## 2020-02-22 PROCEDURE — 6360000002 HC RX W HCPCS: Performed by: NURSE PRACTITIONER

## 2020-02-22 PROCEDURE — 82607 VITAMIN B-12: CPT

## 2020-02-22 PROCEDURE — 94761 N-INVAS EAR/PLS OXIMETRY MLT: CPT

## 2020-02-22 PROCEDURE — 93005 ELECTROCARDIOGRAM TRACING: CPT | Performed by: NURSE PRACTITIONER

## 2020-02-22 PROCEDURE — 85027 COMPLETE CBC AUTOMATED: CPT

## 2020-02-22 PROCEDURE — 85384 FIBRINOGEN ACTIVITY: CPT

## 2020-02-22 PROCEDURE — 83721 ASSAY OF BLOOD LIPOPROTEIN: CPT

## 2020-02-22 PROCEDURE — 85730 THROMBOPLASTIN TIME PARTIAL: CPT

## 2020-02-22 PROCEDURE — 85045 AUTOMATED RETICULOCYTE COUNT: CPT

## 2020-02-22 PROCEDURE — 83615 LACTATE (LD) (LDH) ENZYME: CPT

## 2020-02-22 PROCEDURE — 80048 BASIC METABOLIC PNL TOTAL CA: CPT

## 2020-02-22 PROCEDURE — 99221 1ST HOSP IP/OBS SF/LOW 40: CPT | Performed by: INTERNAL MEDICINE

## 2020-02-22 PROCEDURE — 84443 ASSAY THYROID STIM HORMONE: CPT

## 2020-02-22 PROCEDURE — 85610 PROTHROMBIN TIME: CPT

## 2020-02-22 PROCEDURE — 80061 LIPID PANEL: CPT

## 2020-02-22 PROCEDURE — 85007 BL SMEAR W/DIFF WBC COUNT: CPT

## 2020-02-22 PROCEDURE — 85652 RBC SED RATE AUTOMATED: CPT

## 2020-02-22 PROCEDURE — 96372 THER/PROPH/DIAG INJ SC/IM: CPT

## 2020-02-22 PROCEDURE — 84165 PROTEIN E-PHORESIS SERUM: CPT

## 2020-02-22 PROCEDURE — 83550 IRON BINDING TEST: CPT

## 2020-02-22 PROCEDURE — 86430 RHEUMATOID FACTOR TEST QUAL: CPT

## 2020-02-22 PROCEDURE — 84484 ASSAY OF TROPONIN QUANT: CPT

## 2020-02-22 PROCEDURE — G0378 HOSPITAL OBSERVATION PER HR: HCPCS

## 2020-02-22 PROCEDURE — 99204 OFFICE O/P NEW MOD 45 MIN: CPT | Performed by: INTERNAL MEDICINE

## 2020-02-22 PROCEDURE — 93010 ELECTROCARDIOGRAM REPORT: CPT | Performed by: INTERNAL MEDICINE

## 2020-02-22 PROCEDURE — 6370000000 HC RX 637 (ALT 250 FOR IP): Performed by: NURSE PRACTITIONER

## 2020-02-22 PROCEDURE — 83010 ASSAY OF HAPTOGLOBIN QUANT: CPT

## 2020-02-22 PROCEDURE — 83540 ASSAY OF IRON: CPT

## 2020-02-22 PROCEDURE — 86038 ANTINUCLEAR ANTIBODIES: CPT

## 2020-02-22 PROCEDURE — 82962 GLUCOSE BLOOD TEST: CPT

## 2020-02-22 PROCEDURE — 2580000003 HC RX 258: Performed by: NURSE PRACTITIONER

## 2020-02-22 PROCEDURE — 36415 COLL VENOUS BLD VENIPUNCTURE: CPT

## 2020-02-22 PROCEDURE — 82746 ASSAY OF FOLIC ACID SERUM: CPT

## 2020-02-22 PROCEDURE — 82728 ASSAY OF FERRITIN: CPT

## 2020-02-22 RX ADMIN — ENOXAPARIN SODIUM 40 MG: 40 INJECTION SUBCUTANEOUS at 11:53

## 2020-02-22 RX ADMIN — MIRTAZAPINE 15 MG: 15 TABLET, FILM COATED ORAL at 21:13

## 2020-02-22 RX ADMIN — INSULIN LISPRO 2 UNITS: 100 INJECTION, SOLUTION INTRAVENOUS; SUBCUTANEOUS at 17:11

## 2020-02-22 RX ADMIN — SODIUM CHLORIDE, PRESERVATIVE FREE 10 ML: 5 INJECTION INTRAVENOUS at 11:53

## 2020-02-22 RX ADMIN — PANTOPRAZOLE SODIUM 40 MG: 40 TABLET, DELAYED RELEASE ORAL at 06:38

## 2020-02-22 RX ADMIN — CARVEDILOL 25 MG: 25 TABLET, FILM COATED ORAL at 17:11

## 2020-02-22 RX ADMIN — BUSPIRONE HYDROCHLORIDE 15 MG: 5 TABLET ORAL at 21:13

## 2020-02-22 RX ADMIN — TRAZODONE HYDROCHLORIDE 100 MG: 50 TABLET ORAL at 21:13

## 2020-02-22 RX ADMIN — ALPRAZOLAM 0.5 MG: 0.5 TABLET ORAL at 21:13

## 2020-02-22 RX ADMIN — ATORVASTATIN CALCIUM 20 MG: 20 TABLET, FILM COATED ORAL at 21:13

## 2020-02-22 RX ADMIN — ACETAMINOPHEN 650 MG: 325 TABLET ORAL at 06:39

## 2020-02-22 RX ADMIN — SODIUM CHLORIDE, PRESERVATIVE FREE 10 ML: 5 INJECTION INTRAVENOUS at 21:13

## 2020-02-22 ASSESSMENT — PAIN DESCRIPTION - PROGRESSION
CLINICAL_PROGRESSION: NOT CHANGED

## 2020-02-22 ASSESSMENT — PAIN DESCRIPTION - FREQUENCY: FREQUENCY: CONTINUOUS

## 2020-02-22 ASSESSMENT — PAIN SCALES - GENERAL
PAINLEVEL_OUTOF10: 0
PAINLEVEL_OUTOF10: 0
PAINLEVEL_OUTOF10: 3

## 2020-02-22 ASSESSMENT — PAIN DESCRIPTION - LOCATION: LOCATION: BACK

## 2020-02-22 ASSESSMENT — PAIN DESCRIPTION - ONSET: ONSET: ON-GOING

## 2020-02-22 ASSESSMENT — PAIN DESCRIPTION - DESCRIPTORS: DESCRIPTORS: SHARP

## 2020-02-22 ASSESSMENT — PAIN DESCRIPTION - ORIENTATION: ORIENTATION: LEFT

## 2020-02-22 ASSESSMENT — PAIN DESCRIPTION - PAIN TYPE: TYPE: CHRONIC PAIN

## 2020-02-22 NOTE — CONSULTS
[facility]  Hematology Oncology Consult note    2020  2:35 PM    Patient:    Lazaro Suarez  : 1940   78 y.o. MRN: [id number]  Admitted: 2020  1:25 PM ATT: Corazon Martinez MD   [id number]  AdmitDx: Abdominal pain, epigastric [R10.13]  Dizziness [R42]  Chest pain [R07.9]  Abnormal heart rate [R00.9]  Leukopenia, unspecified type [D72.819]  Chest pain, unspecified type [R07.9]  [facility]2 vertebra[facility] [P03.107Y]  PCP: No primary care provider on file. Reason for Consult: pancytopenia    Requesting Physician:  Corazon Martinez MD      History Obtained From:  Patient but mainly chart review    HISTORY OF PRESENT ILLNESS:    77 YO female reported that she was evaluated by [facility] who found that her HR was very low, hence sent to ER for further evaluation. Upon my interview she appeared very anxious and was very eager to go home. Wanted to call her daughter. She denied any chest pressure, shortness of breath, palpitations. Reported mild dizziness. Denied any fever or any cough. Reported some night sweats but not drenching. Denied any lymphadenopathy. Reported that her appetite is poor and has lost some weight. Reported epigastric pain. Denied nausea, vomiting, diarrhea, constipation. Denied any  symptoms. Denied any lower extremity edema. Reported pain in the left arm area. Denied any overt bleeding or any rash. 10/24/2019 CBC WBC of 2.5 hemoglobin 12.3 hematocrit of 39 MCV of 95.6 and platelets of 781     CBC WBC of 2.1 hemoglobin of 11.7 hematocrit of 36.5 MCV of 92.6 and platelets of 967 differential.  Basically within normal limits. CMP with sodium of 135 potassium of 3.6 BUN of 11 creatinine 1.9 calcium of 8.5 albumin of 3.3 alk phos of 176 otherwise within normal limits. BNP was 511.    2020 ultrasound of the abdomen revealed cholelithiasis and gallbladder sludge. Liver cirrhosis.   CT of the head was normal.  CT scan of pain, No sinus pain, No tinnitus, Normal hearing. Cardiovascular:  No chest pain, No palpitations, No syncope, No upper extremity edema, No lower extremity edema, No calf discomfort. Respiratory:  No cough.  No hemoptysis, No pleurisy, No wheezing. Gastrointestinal:  No nausea, No vomiting, No constipation, No diarrhea, No hemotochezia, No melena, No jaundice, No dyspepsia, No dysphagia. Urinary:  No dysuria, No hematuria, No urinary incontinence. Musculoskeletal:No swollen joints, No joint redness, No bone pain, No spine tenderness. Skin:  No rash, No nodules, No pruritus, No lesions. Neurologic:  No confusion, No seizures, No syncope, No tremor, No speech change, No headache, No hiccups, No abnormal gait, No sensory changes, No weakness. Psychiatric:  No depression, No anxiety, Concentration normal.  Endocrine:  No polyuria, No polydipsia, No hot flashes, No thyroid symptoms. Hematologic:  No epistaxis, No gingival bleeding, No petechiae, No ecchymosis. Lymphatic:  No lymphadenopathy, No lymphedema. Allergy / Immunologic:  No eczema, No frequent mucous infections, No frequent respiratory infections, No recurrent urticarial, No frequent skin infections.     PHYSICAL EXAM:      Vitals:    /65   Pulse 59   Temp 98.4 °F (36.9 °C) (Oral)   Resp 16   Ht 5' 4\" (1.626 m)   Wt 134 lb (60.8 kg)   SpO2 95%   BMI 23.00 kg/m²   CONSTITUTIONAL: awake, alert, anxious  EYES:no palor or any icetrus  ENT: Normocephalic, without obvious abnormality, atraumatic  NECK: No JVD  HEMATOLOGIC/LYMPHATIC: no cervical, supraclavicular or axillary lymphadenopathy   LUNGS:ctab  CARDIOVASCULAR:s1s2 SM+  ABDOMEN:soft ntnd bs pos  MUSCULOSKELETAL: full range of motion noted, tone is normal  NEUROLOGIC: GI  SKIN:few echymosis  EXTREMITIES: no LE edema kati      DATA:    ABGs: No results found for: PHART, PO2ART, TAC3OBQ  CBC:   Recent Labs     02/21/20  1414 02/22/20  0337 02/22/20  1342   WBC 2.1* 2.1* 3.0*   HGB 11.7* 11.1* 12.3*   * 111* 138*     BMP:    Recent Labs     02/21/20  1414 02/22/20  0337    138   K 3.6 3.6   CL 99 103   CO2 27 26   BUN 11 12   CREATININE 0.9 0.9   GLUCOSE 99 98     Magnesium:   Lab Results   Component Value Date    MG 2.1 02/21/2020     Hepatic:   Recent Labs     02/21/20  1414   AST 26   ALT 13   BILITOT 0.7   ALKPHOS 176*     Recent Labs     02/21/20  1414   LIPASE 12*     Recent Labs     02/22/20  0337   PROTIME 13.0   INR 1.07     No results for input(s): PTT in the last 72 hours. Lipids:   Recent Labs     02/22/20  0337   CHOL 158   HDL 48     INR:   Recent Labs     02/22/20  0337   INR 1.07     TSH: No results found for: TSH    Intake/Output Summary (Last 24 hours) at 2/22/2020 1435  Last data filed at 2/22/2020 1153  Gross per 24 hour   Intake 250 ml   Output --   Net 250 ml      dextrose     Pancytopenia: At least since October last year. Medications reviewed. Peripheral smear with no hemolysis, blasts. Could be secondary to underlying cirrhosis. Rule out hemolysis, connective tissue disorder, rheumatoid arthritis, rule out coagulopathy. Rule out monoclonal gammopathy. Rule out thyroid dysfunction. Discussed ruling out hepatitis and HIV but patient defers. May have underlying MDS. Consider further evaluation including bone marrow biopsy, aspiration if above inconclusive and pancytopenia worsening. Chest pain, epigastric pain cardiology consulted. Noted plans for stress test.    Cirrhosis, portal hypertension recommend follow-up with GI. Continue other medical care. Thank you for letting us be part of the care and will follow along. Discussed above findings and plan with her and not sure whether she comprehended.     Henok Doss MD  2/22/2020  2:35 PM

## 2020-02-22 NOTE — CONSULTS
CARDIOLOGY CONSULT NOTE     Reason for consultation:  Bradycardia    Referring physician:   Karolina Neumann MD     Primary care physician:  No primary care provider on file. History of present illness:  Kulwant Becerra is a 78 y. o.year old female patient who presents with shortness of breath, irregular pulse, and chest pain. Patient reports unintentional weight loss and night sweats. She was sent to the ED from home health nurse because of HR in 30's. On arrival to the ED patient heart rate was normal. Patient has a history of high burden PVC's, HTN, HLD, and DM. She had electrophysiology work-up for PVC's in 2018 with medical management. Patient denies any chest pain, mild dizziness, no shortness of breath, and no palpitations. Past medical history:    has a past medical history of Anxiety, Cellulitis, Constipation, Depression, Diabetes mellitus ([address] Hyperlipidemia, Hypertension, Insomnia, Rosacea, and Sinus tachycardia. Past surgical history:   has a past surgical history that includes back surgery; Hysterectomy; and Toe Surgery. Social History:   reports that she has never smoked. She has never used smokeless tobacco. She reports that she does not drink alcohol or use drugs. Family history:  As Reviewed family history is not on file.     Allergies   Allergen Reactions    Chocolate Other (See Comments)     Throbbing headache    Nuts [Peanut-Containing Drug Products] Other (See Comments)     \"makes my nose run and like i have a bad cold\"       ondansetron (ZOFRAN) injection 4 mg, Q30 Min PRN  busPIRone (BUSPAR) tablet 15 mg, BID  mirtazapine (REMERON) tablet 15 mg, Nightly  losartan (COZAAR) tablet 50 mg, Daily  carvedilol (COREG) tablet 25 mg, BID WC  atorvastatin (LIPITOR) tablet 20 mg, Nightly  traZODone (DESYREL) tablet 100 mg, Nightly  pantoprazole (PROTONIX) tablet 40 mg, QAM AC  escitalopram (LEXAPRO) tablet 20 mg, Daily  aspirin EC tablet 81 mg, there is no elevation of JVD. No thyromegaly  4. Respiratory:  · Resp Assessment: Normal  · Resp Auscultation: Normal breath sounds without dullness  5. Cardiovascular:  · Auscultation: 3/6 systolic murmur  · Carotid Arteries: Normal  · Abdominal Aorta: Normal   · Pedal Pulses: 2+ and equal   6. Abdomen:  · No masses or tenderness  · Liver/Spleen: No Abnormalities Noted, no organomegaly. 7. Musculoskeletal: No joint deformities. No muscle wasting  8. Extremities:  ·  No Cyanosis or Clubbing. No significant edema   9. Rectal / genital: deferred. 10. Neurological/Psychiatric:  · Oriented to time, place, and person  · Non-anxious    Lab Review   Recent Labs     02/22/20  1342   WBC 3.0*   HGB 12.3*   HCT 39.1   *      Recent Labs     02/22/20  0337      K 3.6      CO2 26   BUN 12   CREATININE 0.9     Recent Labs     02/21/20  1414   AST 26   ALT 13   BILITOT 0.7   ALKPHOS 176*     No results for input(s): TROPONINI in the last 72 hours. No results found for: BNP  Lab Results   Component Value Date    INR 1.07 02/22/2020    PROTIME 13.0 02/22/2020       EKG:  Normal sinus rhythm   Normal ECG   When compared with ECG of 25-OCT-2019 09:13,   premature ventricular complexes are no longer present    Chest Xray:  No evidence of acute process    ECHO:  ordered    Assessment:  Patient Active Problem List   Diagnosis Code    Dizziness R42    Difficulty swallowing R13.10    PVC (premature ventricular contraction) I49.3    Diabetes mellitus ([address] E11.9    Hypertension I10    Hyperlipemia E78.5    Major depression F32.9    Pain of right lower extremity M79.604    Hematoma of right thigh S70.11XA    Primary osteoarthritis of both knees M17.0    Uncontrolled pain R52    Chest pain R07.9       Recommendations:   1. Bradycardia?: no documentation of bradycardia. The report of 30 bpm by home health nurse was most likely due to PVC's.  Patient has high burden PVC's and was previously worked up by

## 2020-02-22 NOTE — PLAN OF CARE
Problem: Falls - Risk of:  Goal: Will remain free from falls  Description  Will remain free from falls  Outcome: Ongoing  Goal: Absence of physical injury  Description  Absence of physical injury  Outcome: Ongoing     Problem: Safety:  Goal: Free from accidental physical injury  Description  Free from accidental physical injury  Outcome: Ongoing  Goal: Free from intentional harm  Description  Free from intentional harm  Outcome: Ongoing

## 2020-02-22 NOTE — H&P
History and Physical      Name:  Tushar Palumbo /Age/Sex: 1940  (78 y.o. female)   MRN & CSN:  [id number] & [id number] Admission Date/Time: 2020  1:25 PM   Location:  [facility]/NONE PCP: No primary care provider on file. Admitting Physicians : Dr. Sharon Joshua is a 78 y.o.  female  who presents with Shortness of Breath and Irregular Heart Beat    Assessment and Plan:   Chest pain with epigastric pain  Initial troponin negative. CT abd.: Cholelithiasis and gallbladder sludge.  No additional sonographic evidence of acute cholecystitis.  Normal common bile duct.  Liver cirrhosis.  No focal masses.  -Continue ASA, BB, and statins  -Serial troponin  -Lipid panel  -Echo  -Consult cardiology    Pancytopenia  # Weight loss and night sweat  Denies bleed  -Protime/INR&PTT  -Hem/onc consult    HTN, normotensive  -Continue Coreg and Cozaar    Insomnia  -Continue Trazodone    Anxiety/ Major depression  -Continue Buspar, Lexapro, Xanax, Remeron    DMII without complications  -Hold oral medications  -Monitor FSBS and SSI          DVT-PPX: Lovenox  Diet:Cardiac    Medications:   Medications:    Infusions:   PRN Meds: ondansetron, 4 mg, Q30 Min PRN        Current Facility-Administered Medications:     ondansetron (ZOFRAN) injection 4 mg, 4 mg, Intravenous, Q30 Min PRN, Hodan Jean Victor, PA, 4 mg at 20 1608    Current Outpatient Medications:     omeprazole (PRILOSEC) 20 MG delayed release capsule, Take 20 mg by mouth daily, Disp: , Rfl:     simvastatin (ZOCOR) 40 MG tablet, Take 40 mg by mouth nightly, Disp: , Rfl:     traZODone (DESYREL) 50 MG tablet, Take  mg by mouth nightly , Disp: , Rfl:     losartan (COZAAR) 50 MG tablet, Take 1 tablet by mouth daily, Disp: 30 tablet, Rfl: 0    carvedilol (COREG) 25 MG tablet, Take 1 tablet by mouth 2 times daily (with meals), Disp: 60 tablet, Rfl: 0    mirtazapine (REMERON) 15 MG tablet, Take 1 tablet by mouth nightly, Disp: 30 tablet, Rfl: 0   discord. HEME/LYMPHATIC/IMMUNO:  Denies , bruising, bleeding abnormalities   ENDO:  Denies any heat or cold intolerance, panemiaolyuria or polydipsia. Objective:   No intake or output data in the 24 hours ending 02/21/20 1905   Vitals:   Vitals:    02/21/20 1626   BP:    Pulse:    Resp: 14   Temp:    SpO2: 98%     Physical Exam:   Gen:  awake, alert, cooperative, no apparent distress  EYES:Lids and lashes normal, pupils equal, round ,extra ocular muscles intact, sclera clear, conjunctiva normal  ENT:  Normocephalic, oral pharynx with moist mucus membranes  NECK:  Supple, symmetrical, trachea midline, no adenopathy,  LUNGS:  Clear to auscultate bilaterally, no rales ronchi or wheezing noted. CARDIOVASCULAR:  regular rate and rhythm, normal S1 and S2,no murmur noted, peripheral pulses 2+, no pitting edema  ABDOMEN: Normal BS, Non tender, non distended, no HSM noted. MUSCULOSKELETAL:  ROM of all extremities grossly wnl  NEUROLOGIC: AOx 3,  Cranial nerves II-XII are grossly intact. Motor is 5 out of 5 bilaterally. Sensory is intact, no lateralizing findings. SKIN:  no bruising or bleeding, normal skin color, turgor, no redness, warmth, or swelling      Past Medical History:      Past Medical History:   Diagnosis Date    Anxiety     Cellulitis     foot.  Constipation     Depression     Diabetes mellitus ([address]     Hyperlipidemia     Hypertension     Insomnia     Rosacea     Sinus tachycardia      PSHX:  has a past surgical history that includes back surgery; Hysterectomy; and Toe Surgery. Allergies: Allergies   Allergen Reactions    Chocolate Other (See Comments)     Throbbing headache    Nuts [Peanut-Containing Drug Products] Other (See Comments)     \"makes my nose run and like i have a bad cold\"       FAM HX: Reviewed and noncontributory  Family history reviewed and is essentially negative unless as stated above.      Soc HX:   Social History     Socioeconomic History    Marital status:

## 2020-02-23 LAB
ANION GAP SERPL CALCULATED.3IONS-SCNC: 10 MMOL/L (ref 4–16)
BUN BLDV-MCNC: 13 MG/DL (ref 6–23)
CALCIUM SERPL-MCNC: 8.8 MG/DL (ref 8.3–10.6)
CHLORIDE BLD-SCNC: 105 MMOL/L (ref 99–110)
CO2: 28 MMOL/L (ref 21–32)
CREAT SERPL-MCNC: 1 MG/DL (ref 0.6–1.1)
GFR AFRICAN AMERICAN: >60 ML/MIN/1.73M2
GFR NON-AFRICAN AMERICAN: 53 ML/MIN/1.73M2
GLUCOSE BLD-MCNC: 114 MG/DL (ref 70–99)
GLUCOSE BLD-MCNC: 118 MG/DL (ref 70–99)
GLUCOSE BLD-MCNC: 130 MG/DL (ref 70–99)
GLUCOSE BLD-MCNC: 173 MG/DL (ref 70–99)
GLUCOSE BLD-MCNC: 82 MG/DL (ref 70–99)
GLUCOSE BLD-MCNC: 91 MG/DL (ref 70–99)
HCT VFR BLD CALC: 34.5 % (ref 37–47)
HEMOGLOBIN: 11 GM/DL (ref 12.5–16)
MCH RBC QN AUTO: 29.8 PG (ref 27–31)
MCHC RBC AUTO-ENTMCNC: 31.9 % (ref 32–36)
MCV RBC AUTO: 93.5 FL (ref 78–100)
PDW BLD-RTO: 14.6 % (ref 11.7–14.9)
PLATELET # BLD: 100 K/CU MM (ref 140–440)
PMV BLD AUTO: 10 FL (ref 7.5–11.1)
POTASSIUM SERPL-SCNC: 3.8 MMOL/L (ref 3.5–5.1)
RBC # BLD: 3.69 M/CU MM (ref 4.2–5.4)
SODIUM BLD-SCNC: 143 MMOL/L (ref 135–145)
WBC # BLD: 2.1 K/CU MM (ref 4–10.5)

## 2020-02-23 PROCEDURE — 6370000000 HC RX 637 (ALT 250 FOR IP): Performed by: NURSE PRACTITIONER

## 2020-02-23 PROCEDURE — G0378 HOSPITAL OBSERVATION PER HR: HCPCS

## 2020-02-23 PROCEDURE — 2580000003 HC RX 258: Performed by: NURSE PRACTITIONER

## 2020-02-23 PROCEDURE — 99232 SBSQ HOSP IP/OBS MODERATE 35: CPT | Performed by: INTERNAL MEDICINE

## 2020-02-23 PROCEDURE — 36415 COLL VENOUS BLD VENIPUNCTURE: CPT

## 2020-02-23 PROCEDURE — 94761 N-INVAS EAR/PLS OXIMETRY MLT: CPT

## 2020-02-23 PROCEDURE — 6360000002 HC RX W HCPCS: Performed by: NURSE PRACTITIONER

## 2020-02-23 PROCEDURE — 82962 GLUCOSE BLOOD TEST: CPT

## 2020-02-23 PROCEDURE — 96372 THER/PROPH/DIAG INJ SC/IM: CPT

## 2020-02-23 PROCEDURE — 80048 BASIC METABOLIC PNL TOTAL CA: CPT

## 2020-02-23 PROCEDURE — APPSS60 APP SPLIT SHARED TIME 46-60 MINUTES: Performed by: NURSE PRACTITIONER

## 2020-02-23 PROCEDURE — 85027 COMPLETE CBC AUTOMATED: CPT

## 2020-02-23 RX ORDER — FERROUS GLUCONATE 324(37.5)
324 TABLET ORAL 2 TIMES DAILY
Status: DISCONTINUED | OUTPATIENT
Start: 2020-02-24 | End: 2020-02-27 | Stop reason: HOSPADM

## 2020-02-23 RX ADMIN — MIRTAZAPINE 15 MG: 15 TABLET, FILM COATED ORAL at 20:10

## 2020-02-23 RX ADMIN — ASPIRIN 81 MG: 81 TABLET, COATED ORAL at 08:00

## 2020-02-23 RX ADMIN — PANTOPRAZOLE SODIUM 40 MG: 40 TABLET, DELAYED RELEASE ORAL at 06:38

## 2020-02-23 RX ADMIN — SODIUM CHLORIDE, PRESERVATIVE FREE 10 ML: 5 INJECTION INTRAVENOUS at 08:03

## 2020-02-23 RX ADMIN — CARVEDILOL 25 MG: 25 TABLET, FILM COATED ORAL at 07:57

## 2020-02-23 RX ADMIN — TRAZODONE HYDROCHLORIDE 100 MG: 50 TABLET ORAL at 20:10

## 2020-02-23 RX ADMIN — CARVEDILOL 25 MG: 25 TABLET, FILM COATED ORAL at 17:02

## 2020-02-23 RX ADMIN — BUSPIRONE HYDROCHLORIDE 15 MG: 5 TABLET ORAL at 20:10

## 2020-02-23 RX ADMIN — BUSPIRONE HYDROCHLORIDE 15 MG: 5 TABLET ORAL at 07:58

## 2020-02-23 RX ADMIN — ENOXAPARIN SODIUM 40 MG: 40 INJECTION SUBCUTANEOUS at 08:00

## 2020-02-23 RX ADMIN — ACETAMINOPHEN 650 MG: 325 TABLET ORAL at 23:33

## 2020-02-23 RX ADMIN — ESCITALOPRAM OXALATE 20 MG: 10 TABLET ORAL at 08:00

## 2020-02-23 RX ADMIN — ALPRAZOLAM 0.5 MG: 0.5 TABLET ORAL at 23:33

## 2020-02-23 RX ADMIN — LOSARTAN POTASSIUM 50 MG: 50 TABLET ORAL at 08:00

## 2020-02-23 RX ADMIN — INSULIN LISPRO 1 UNITS: 100 INJECTION, SOLUTION INTRAVENOUS; SUBCUTANEOUS at 09:14

## 2020-02-23 RX ADMIN — ALPRAZOLAM 0.5 MG: 0.5 TABLET ORAL at 06:35

## 2020-02-23 RX ADMIN — SODIUM CHLORIDE, PRESERVATIVE FREE 10 ML: 5 INJECTION INTRAVENOUS at 20:10

## 2020-02-23 RX ADMIN — ATORVASTATIN CALCIUM 20 MG: 20 TABLET, FILM COATED ORAL at 20:10

## 2020-02-23 ASSESSMENT — PAIN DESCRIPTION - LOCATION: LOCATION: SHOULDER

## 2020-02-23 ASSESSMENT — PAIN SCALES - GENERAL
PAINLEVEL_OUTOF10: 10
PAINLEVEL_OUTOF10: 0
PAINLEVEL_OUTOF10: 3

## 2020-02-23 ASSESSMENT — PAIN DESCRIPTION - ORIENTATION: ORIENTATION: LEFT

## 2020-02-23 NOTE — PROGRESS NOTES
Cardiology Progress Note     Today's Plan:CPM, stress test and echo tomorrow     Admit Date:  2/21/2020    Consult reason/ Seen today for: Bradycardia    Subjective and  Overnight Events:  Patient reports left arm pain on mobility along with left side pain. She reports getting medication to relieve pain which is improving. She denies shortness of breath or palpitations. Assessment / Plan / Recommendation:     1. Bradycardia?: no bradycardia over night. Patient has high burden PVC's and was previously worked up by electrophysiology with medical management. Continue Coreg 25 mg. Will reduce dose if needed. Echo per care everywhere 5/19 -normal EF with moderate to severe AS. Will get Echo and stress test in am to R/O ACS cause of PVC's. Troponin's negative, no chest pain. 2. HTN: controlled, continue Losartan and coreg   3. Dyslipidemia: continue statins  4. VHD: Echo ordered. History of Presenting Illness:    Chief complain on admission : 78 y. o.year old who is admitted for  Chief Complaint   Patient presents with    Shortness of Breath    Irregular Heart Beat        Past medical history:    has a past medical history of Anxiety, Cellulitis, Constipation, Depression, Diabetes mellitus ([address] Hyperlipidemia, Hypertension, Insomnia, Rosacea, and Sinus tachycardia. Past surgical history:   has a past surgical history that includes back surgery; Hysterectomy; and Toe Surgery. Social History:   reports that she has never smoked. She has never used smokeless tobacco. She reports that she does not drink alcohol or use drugs. Family history:  family history is not on file.     Allergies   Allergen Reactions    Chocolate Other (See Comments)     Throbbing headache    Nuts [Peanut-Containing Drug Products] Other (See Comments)     \"makes my nose run and like i have a bad cold\"       Review of Systems:   All 14 systems were S1S2  Abdomen:   nontender  Extremities:  no edema  Pulses; palpable  Neuro: grossly normal      MEDICAL DECISION MAKING;    I agree with the above plan, which was planned by myself and discussed with NP.       Alec Crane MD [facility]

## 2020-02-23 NOTE — PROGRESS NOTES
ONCOLOGY HEMATOLOGY CARE ([facility])  PROGRESS NOTE      2020  3:56 PM    Patient:    Ryann Wang  : 1940   78 y.o. MRN: [id number]  Admitted: 2020  1:25 PM ATT: Shahab Wilson MD   3003/3003-A  AdmitDx: Abdominal pain, epigastric [R10.13]  Dizziness [R42]  Chest pain [R07.9]  Abnormal heart rate [R00.9]  Leukopenia, unspecified type [D72.819]  Chest pain, unspecified type [R07.9]  [facility] [X04.482U]  PCP: No primary care provider on file. Patient was seen and examined today. Not in any acute distress and no overnight events. Reported that she continues to have pain in the left upper ext. Denied any chest pain, increased sob, cough, fever. Was anxious to know when the stress test is tomorrow.        REVIEW OF SYSTEMS      Constitutional:  Denies fever, chills, loss of appetite, weight loss  HEENT:  Denies swelling of neck glands  Respiratory:  Denies cough, shortness of breath or hemoptysis  Cardiovascular:  Denies chest pain, palpitations or swelling   GI:  Denies abdominal pain, nausea, vomiting, constipation or diarrhea   Musculoskeletal:  Denies back pain   Skin:  Denies rash   Neurologic:  Denies headache, focal weakness or sensory changes   PSYCHIATRIC:  Neg depression, personality changes  ALL/IMM:  Neg reactions to drugs other than listed    All systems negative except  for symptoms of HPI and as marked as above    PHYSICAL EXAM :    Vitals: BP (!) 112/57   Pulse 57   Temp 98 °F (36.7 °C) (Oral)   Resp 21   Ht 5' 4\" (1.626 m)   Wt 135 lb (61.2 kg)   SpO2 96%   BMI 23.17 kg/m²     CONSTITUTIONAL: awake, alert, anxious  EYES:no palor or any icetrus  ENT: Normocephalic, without obvious abnormality, atraumatic  NECK: No JVD  HEMATOLOGIC/LYMPHATIC: no cervical, supraclavicular or axillary lymphadenopathy   LUNGS:ctab  CARDIOVASCULAR:s1s2 SM+  ABDOMEN:soft ntnd bs pos  MUSCULOSKELETAL: full range of motion noted, tone is normal  NEUROLOGIC: GI  SKIN:few echymosis  EXTREMITIES: no LE edema bilaterally    LABORATORY RESULTS  CBC:   Recent Labs     02/22/20  0337 02/22/20  1342 02/23/20  0347   WBC 2.1* 3.0* 2.1*   HGB 11.1* 12.3* 11.0*   * 138* 100*     BMP:    Recent Labs     02/21/20  1414 02/22/20  0337 02/23/20  0347    138 143   K 3.6 3.6 3.8   CL 99 103 105   CO2 27 26 28   BUN 11 12 13   CREATININE 0.9 0.9 1.0   GLUCOSE 99 98 118*     Hepatic:   Recent Labs     02/21/20  1414   AST 26   ALT 13   BILITOT 0.7   ALKPHOS 176*     INR:   Recent Labs     02/22/20  0337   INR 1.07         RADIOLOGY REPORTS  2/21/2020 ultrasound of the abdomen revealed cholelithiasis and gallbladder sludge. Liver cirrhosis. CT of the head was normal.  CT scan of the abdomen pelvis revealed L2 superior endplate compression fracture. Cirrhosis with a small volume of ascites and evidence of portal hypertension including mild splenomegaly and paraesophageal varices. Cholelithiasis. ASSESSMENT & RECOMMENDATIONS:  Pancytopenia: At least since October 2019. Medications reviewed. Peripheral smear with no hemolysis, blasts. Could be secondary to underlying cirrhosis. Note low iron, r/o blood loss. Note UA neg. FOB ordered. Recommend oral iron supplementation. No hemolysis per labs or peripheral smear. Rule out connective tissue disorder, rheumatoid arthritis, monoclonal gammopathy. No thyroid dysfunction. Discussed ruling out hepatitis and HIV but patient defers. May have underlying MDS. Consider further evaluation including bone marrow biopsy, aspiration if above inconclusive and pancytopenia worsening but pt defers to follow up as OP at this point. Ok to use LD ASA.     Chest pain, epigastric pain cardiology consulted. Noted plans for stress test.     Cirrhosis, portal hypertension recommend follow-up with GI as OP     Continue other medical care.     This plan was discussed with the patient and he/she verbalized understanding. We will continue to follow the patient. Thank you for allowing us to participate in the care of this patient.      Cathie Reich MD, 2/23/2020, 3:56 PM

## 2020-02-24 ENCOUNTER — APPOINTMENT (OUTPATIENT)
Dept: NUCLEAR MEDICINE | Age: 80
DRG: 392 | End: 2020-02-24
Payer: MEDICARE

## 2020-02-24 LAB
ALBUMIN ELP: 3.3 GM/DL (ref 3.2–5.6)
ALPHA-1-GLOBULIN: 0.3 GM/DL (ref 0.1–0.4)
ALPHA-2-GLOBULIN: 0.7 GM/DL (ref 0.4–1.2)
ANION GAP SERPL CALCULATED.3IONS-SCNC: 9 MMOL/L (ref 4–16)
BETA GLOBULIN: 1.1 GM/DL (ref 0.5–1.3)
BUN BLDV-MCNC: 12 MG/DL (ref 6–23)
CALCIUM SERPL-MCNC: 8.7 MG/DL (ref 8.3–10.6)
CHLORIDE BLD-SCNC: 106 MMOL/L (ref 99–110)
CO2: 29 MMOL/L (ref 21–32)
CREAT SERPL-MCNC: 1 MG/DL (ref 0.6–1.1)
GAMMA GLOBULIN: 0.9 GM/DL (ref 0.5–1.6)
GFR AFRICAN AMERICAN: >60 ML/MIN/1.73M2
GFR NON-AFRICAN AMERICAN: 53 ML/MIN/1.73M2
GLUCOSE BLD-MCNC: 101 MG/DL (ref 70–99)
GLUCOSE BLD-MCNC: 106 MG/DL (ref 70–99)
GLUCOSE BLD-MCNC: 120 MG/DL (ref 70–99)
GLUCOSE BLD-MCNC: 172 MG/DL (ref 70–99)
HCT VFR BLD CALC: 36.3 % (ref 37–47)
HEMOGLOBIN: 11.1 GM/DL (ref 12.5–16)
LV EF: 58 %
LV EF: 65 %
LVEF MODALITY: NORMAL
LVEF MODALITY: NORMAL
MCH RBC QN AUTO: 29.5 PG (ref 27–31)
MCHC RBC AUTO-ENTMCNC: 30.6 % (ref 32–36)
MCV RBC AUTO: 96.5 FL (ref 78–100)
PDW BLD-RTO: 14.6 % (ref 11.7–14.9)
PLATELET # BLD: 94 K/CU MM (ref 140–440)
PMV BLD AUTO: 10.5 FL (ref 7.5–11.1)
POTASSIUM SERPL-SCNC: 3.9 MMOL/L (ref 3.5–5.1)
RBC # BLD: 3.76 M/CU MM (ref 4.2–5.4)
SODIUM BLD-SCNC: 144 MMOL/L (ref 135–145)
SPEP INTERPRETATION: ABNORMAL
TOTAL PROTEIN: 6.2 GM/DL (ref 6.4–8.2)
WBC # BLD: ABNORMAL K/CU MM (ref 4–10.5)

## 2020-02-24 PROCEDURE — G0378 HOSPITAL OBSERVATION PER HR: HCPCS

## 2020-02-24 PROCEDURE — A9500 TC99M SESTAMIBI: HCPCS | Performed by: INTERNAL MEDICINE

## 2020-02-24 PROCEDURE — 3430000000 HC RX DIAGNOSTIC RADIOPHARMACEUTICAL: Performed by: INTERNAL MEDICINE

## 2020-02-24 PROCEDURE — APPSS60 APP SPLIT SHARED TIME 46-60 MINUTES: Performed by: NURSE PRACTITIONER

## 2020-02-24 PROCEDURE — 6370000000 HC RX 637 (ALT 250 FOR IP): Performed by: INTERNAL MEDICINE

## 2020-02-24 PROCEDURE — 93017 CV STRESS TEST TRACING ONLY: CPT

## 2020-02-24 PROCEDURE — 93306 TTE W/DOPPLER COMPLETE: CPT

## 2020-02-24 PROCEDURE — 82962 GLUCOSE BLOOD TEST: CPT

## 2020-02-24 PROCEDURE — 2580000003 HC RX 258: Performed by: NURSE PRACTITIONER

## 2020-02-24 PROCEDURE — 80048 BASIC METABOLIC PNL TOTAL CA: CPT

## 2020-02-24 PROCEDURE — 6370000000 HC RX 637 (ALT 250 FOR IP): Performed by: NURSE PRACTITIONER

## 2020-02-24 PROCEDURE — 6370000000 HC RX 637 (ALT 250 FOR IP): Performed by: PHYSICIAN ASSISTANT

## 2020-02-24 PROCEDURE — 78452 HT MUSCLE IMAGE SPECT MULT: CPT

## 2020-02-24 PROCEDURE — 6360000002 HC RX W HCPCS: Performed by: INTERNAL MEDICINE

## 2020-02-24 PROCEDURE — 94761 N-INVAS EAR/PLS OXIMETRY MLT: CPT

## 2020-02-24 PROCEDURE — 36415 COLL VENOUS BLD VENIPUNCTURE: CPT

## 2020-02-24 PROCEDURE — 1200000000 HC SEMI PRIVATE

## 2020-02-24 PROCEDURE — 99233 SBSQ HOSP IP/OBS HIGH 50: CPT | Performed by: INTERNAL MEDICINE

## 2020-02-24 PROCEDURE — 85027 COMPLETE CBC AUTOMATED: CPT

## 2020-02-24 RX ORDER — AMINOPHYLLINE DIHYDRATE 25 MG/ML
75 INJECTION, SOLUTION INTRAVENOUS ONCE
Status: COMPLETED | OUTPATIENT
Start: 2020-02-24 | End: 2020-02-24

## 2020-02-24 RX ORDER — CYCLOBENZAPRINE HCL 10 MG
5 TABLET ORAL ONCE
Status: COMPLETED | OUTPATIENT
Start: 2020-02-24 | End: 2020-02-24

## 2020-02-24 RX ADMIN — CARVEDILOL 25 MG: 25 TABLET, FILM COATED ORAL at 09:45

## 2020-02-24 RX ADMIN — FERROUS GLUCONATE TAB 324 MG (37.5 MG ELEMENTAL IRON) 324 MG: 324 (37.5 FE) TAB at 23:19

## 2020-02-24 RX ADMIN — SODIUM CHLORIDE, PRESERVATIVE FREE 10 ML: 5 INJECTION INTRAVENOUS at 09:46

## 2020-02-24 RX ADMIN — ALPRAZOLAM 0.5 MG: 0.5 TABLET ORAL at 09:45

## 2020-02-24 RX ADMIN — ACETAMINOPHEN 650 MG: 325 TABLET ORAL at 23:18

## 2020-02-24 RX ADMIN — Medication 10 MILLICURIE: at 13:09

## 2020-02-24 RX ADMIN — CARVEDILOL 25 MG: 25 TABLET, FILM COATED ORAL at 17:50

## 2020-02-24 RX ADMIN — FERROUS GLUCONATE TAB 324 MG (37.5 MG ELEMENTAL IRON) 324 MG: 324 (37.5 FE) TAB at 09:46

## 2020-02-24 RX ADMIN — LOSARTAN POTASSIUM 50 MG: 50 TABLET ORAL at 09:46

## 2020-02-24 RX ADMIN — CYCLOBENZAPRINE 5 MG: 10 TABLET, FILM COATED ORAL at 15:02

## 2020-02-24 RX ADMIN — REGADENOSON 0.4 MG: 0.08 INJECTION, SOLUTION INTRAVENOUS at 11:37

## 2020-02-24 RX ADMIN — SODIUM CHLORIDE, PRESERVATIVE FREE 10 ML: 5 INJECTION INTRAVENOUS at 23:20

## 2020-02-24 RX ADMIN — MIRTAZAPINE 15 MG: 15 TABLET, FILM COATED ORAL at 23:19

## 2020-02-24 RX ADMIN — TRAZODONE HYDROCHLORIDE 100 MG: 50 TABLET ORAL at 23:19

## 2020-02-24 RX ADMIN — ESCITALOPRAM OXALATE 20 MG: 10 TABLET ORAL at 09:45

## 2020-02-24 RX ADMIN — AMINOPHYLLINE DIHYDRATE 75 MG: 25 INJECTION, SOLUTION INTRAVENOUS at 11:48

## 2020-02-24 RX ADMIN — BUSPIRONE HYDROCHLORIDE 15 MG: 5 TABLET ORAL at 23:19

## 2020-02-24 RX ADMIN — ATORVASTATIN CALCIUM 20 MG: 20 TABLET, FILM COATED ORAL at 23:19

## 2020-02-24 RX ADMIN — Medication 30 MILLICURIE: at 13:09

## 2020-02-24 RX ADMIN — BUSPIRONE HYDROCHLORIDE 15 MG: 5 TABLET ORAL at 09:45

## 2020-02-24 ASSESSMENT — PAIN DESCRIPTION - FREQUENCY: FREQUENCY: INTERMITTENT

## 2020-02-24 ASSESSMENT — PAIN DESCRIPTION - DESCRIPTORS: DESCRIPTORS: SHARP

## 2020-02-24 ASSESSMENT — PAIN SCALES - GENERAL
PAINLEVEL_OUTOF10: 9
PAINLEVEL_OUTOF10: 0
PAINLEVEL_OUTOF10: 3
PAINLEVEL_OUTOF10: 3
PAINLEVEL_OUTOF10: 6

## 2020-02-24 ASSESSMENT — PAIN DESCRIPTION - ORIENTATION: ORIENTATION: LEFT

## 2020-02-24 ASSESSMENT — PAIN DESCRIPTION - LOCATION: LOCATION: RIB CAGE

## 2020-02-24 ASSESSMENT — PAIN DESCRIPTION - PAIN TYPE: TYPE: ACUTE PAIN

## 2020-02-24 NOTE — PLAN OF CARE
Problem: Falls - Risk of:  Goal: Will remain free from falls  Description  Will remain free from falls  Outcome: Ongoing  Goal: Absence of physical injury  Description  Absence of physical injury  Outcome: Ongoing     Problem: Infection:  Goal: Will remain free from infection  Description  Will remain free from infection  Outcome: Ongoing     Problem: Safety:  Goal: Free from accidental physical injury  Description  Free from accidental physical injury  Outcome: Ongoing  Goal: Free from intentional harm  Description  Free from intentional harm  Outcome: Ongoing     Problem: Daily Care:  Goal: Daily care needs are met  Description  Daily care needs are met  Outcome: Ongoing     Problem: Pain:  Goal: Patient's pain/discomfort is manageable  Description  Patient's pain/discomfort is manageable  Outcome: Ongoing  Goal: Pain level will decrease  Description  Pain level will decrease  Outcome: Ongoing  Goal: Control of acute pain  Description  Control of acute pain  Outcome: Ongoing  Goal: Control of chronic pain  Description  Control of chronic pain  Outcome: Ongoing     Problem: Skin Integrity:  Goal: Skin integrity will stabilize  Description  Skin integrity will stabilize  Outcome: Ongoing     Problem: Discharge Planning:  Goal: Patients continuum of care needs are met  Description  Patients continuum of care needs are met  Outcome: Ongoing

## 2020-02-24 NOTE — PROGRESS NOTES
ONCOLOGY HEMATOLOGY CARE ([facility])  PROGRESS NOTE      2020  7:20 AM    Patient:    Miracle Gardner  : 1940   78 y.o. MRN: [id number]  Admitted: 2020  1:25 PM ATT: Babita Glover MD   3003/3003-A  AdmitDx: Abdominal pain, epigastric [R10.13]  Dizziness [R42]  Chest pain [R07.9]  Abnormal heart rate [R00.9]  Leukopenia, unspecified type [D72.819]  Chest pain, unspecified type [R07.9]  [facility] [B05.357S]  PCP: No primary care provider on file. Patient was seen and examined today. Not in any acute distress and no overnight events. Is anxious to know when her stress test appt is . Denied any chest pain, increased sob. Denied any fever, bleeding. No cough        REVIEW OF SYSTEMS      Constitutional:  Denies fever, chills, loss of appetite, weight loss  HEENT:  Denies swelling of neck glands  Respiratory:  Denies cough, shortness of breath or hemoptysis  Cardiovascular:  Denies chest pain, palpitations or swelling   GI:  Denies abdominal pain, nausea, vomiting, constipation or diarrhea   Musculoskeletal:  Denies back pain   Skin:  Denies rash   Neurologic:  Denies headache, focal weakness or sensory changes   PSYCHIATRIC:  Neg depression, personality changes, anxiety.   ALL/IMM:  Neg reactions to drugs other than listed    All systems negative except  for symptoms of HPI and as marked as above    PHYSICAL EXAM :    Vitals: BP (!) 124/57   Pulse 62   Temp 97.7 °F (36.5 °C) (Oral)   Resp 14   Ht 5' 4\" (1.626 m)   Wt 138 lb 1.6 oz (62.6 kg)   SpO2 93%   BMI 23.70 kg/m²     CONSTITUTIONAL: awake, alert, anxious  EYES:no palor or any icetrus  ENT: ATNC  NECK: No JVD  HEMATOLOGIC/LYMPHATIC: no cervical, supraclavicular or axillary lymphadenopathy   LUNGS:ctab  CARDIOVASCULAR:s1s2 SM+  ABDOMEN:soft ntnd bs pos  MUSCULOSKELETAL: full range of motion noted, tone is normal  NEUROLOGIC: GI  SKIN:few echymosis  EXTREMITIES: no LE medical care.     This plan was discussed with the patient and she verbalized understanding. We will continue to follow the patient.     Thank you for allowing us to participate in the care of this patient.   Kirsten Garcia MD, 2/24/2020, 7:20 AM

## 2020-02-24 NOTE — PROGRESS NOTES
Department of Internal Medicine  General Internal Medicine  Attending Progress Note      SUBJECTIVE:  She is going for stress test today.       OBJECTIVE      Medications    Current Facility-Administered Medications: cyclobenzaprine (FLEXERIL) tablet 5 mg, 5 mg, Oral, Once  ferrous gluconate 324 (37.5 Fe) MG tablet 324 mg, 324 mg, Oral, BID  ondansetron (ZOFRAN) injection 4 mg, 4 mg, Intravenous, Q30 Min PRN  busPIRone (BUSPAR) tablet 15 mg, 15 mg, Oral, BID  mirtazapine (REMERON) tablet 15 mg, 15 mg, Oral, Nightly  losartan (COZAAR) tablet 50 mg, 50 mg, Oral, Daily  carvedilol (COREG) tablet 25 mg, 25 mg, Oral, BID WC  atorvastatin (LIPITOR) tablet 20 mg, 20 mg, Oral, Nightly  traZODone (DESYREL) tablet 100 mg, 100 mg, Oral, Nightly  pantoprazole (PROTONIX) tablet 40 mg, 40 mg, Oral, QAM AC  escitalopram (LEXAPRO) tablet 20 mg, 20 mg, Oral, Daily  aspirin EC tablet 81 mg, 81 mg, Oral, Daily  ALPRAZolam (XANAX) tablet 0.5 mg, 0.5 mg, Oral, TID PRN  sodium chloride flush 0.9 % injection 10 mL, 10 mL, Intravenous, 2 times per day  sodium chloride flush 0.9 % injection 10 mL, 10 mL, Intravenous, PRN  acetaminophen (TYLENOL) tablet 650 mg, 650 mg, Oral, Q6H PRN  acetaminophen (TYLENOL) suppository 650 mg, 650 mg, Rectal, Q6H PRN  polyethylene glycol (GLYCOLAX) packet 17 g, 17 g, Oral, Daily PRN  promethazine (PHENERGAN) tablet 12.5 mg, 12.5 mg, Oral, Q6H PRN  ondansetron (ZOFRAN) injection 4 mg, 4 mg, Intravenous, Q6H PRN  enoxaparin (LOVENOX) injection 40 mg, 40 mg, Subcutaneous, Daily  insulin lispro (HUMALOG) injection vial 0-6 Units, 0-6 Units, Subcutaneous, TID WC  insulin lispro (HUMALOG) injection vial 0-3 Units, 0-3 Units, Subcutaneous, Nightly  glucose (GLUTOSE) 40 % oral gel 15 g, 15 g, Oral, PRN  dextrose 50 % IV solution, 12.5 g, Intravenous, PRN  glucagon (rDNA) injection 1 mg, 1 mg, Intramuscular, PRN  dextrose 5 % solution, 100 mL/hr, Intravenous, PRN    Physical    VITALS:  BP (!) 150/71   Pulse 58

## 2020-02-24 NOTE — PROGRESS NOTES
02/22/20  0337 02/23/20  0347 02/24/20  0647    143 144   K 3.6 3.8 3.9    105 106   CO2 26 28 29   BUN 12 13 12   CREATININE 0.9 1.0 1.0     PT/INR:   Recent Labs     02/22/20  0337   PROTIME 13.0   INR 1.07     BNP:    Recent Labs     02/21/20  1414   PROBNP 511.3*     TROPONIN:   Recent Labs     02/21/20  1414 02/21/20  1800 02/22/20  0337   TROPONINT <0.010 <0.010 <0.010        Impression:  Active Problems:    Chest pain  Resolved Problems:    * No resolved hospital problems. *       All labs, medications and tests reviewed by myself, continue all other medications of all above medical condition listed as is except for changes mentioned above. Thank you   Please call with questions. Electronically signed by Charlene Perry. Yana Kaufman, WES - CNP on 2/24/2020 at 1:28 PM   I have seen ,spoken to  and examined this patient personally, independently of the nurse practitioner. I have reviewed the hospital care given to date and reviewed all pertinent labs and imaging. The plan was developed mutually at the time of the visit with the patient, Clinical Nurse Practitioner  and myself. I have spoken with patient, nursing staff and provided written and verbal instructions . The above note has been reviewed and I agree with the assessment, diagnosis, and treatment plan with changes made by me as follows     CARDIOLOGY ATTENDING ADDENDUM    HPI:  I have reviewed the above HPI  And agree with above   Na Pacheco is a 78 y. o.year old who and presents with had concerns including Shortness of Breath and Irregular Heart Beat.   Chief Complaint   Patient presents with    Shortness of Breath    Irregular Heart Beat     Interval history:  No significant change    Physical Exam:  General:  Awake, alert, NAD  Head:normal  Eye:normal  Neck:  No JVD   Chest:  Clear to auscultation, respiration easy  Cardiovascular:  RRR L7Y9 3/6 systolic murmur  Abdomen:   nontender  Extremities:  no edema  Pulses; palpable  Neuro: grossly normal      MEDICAL DECISION MAKING;    I agree with the above plan, which was planned by myself and discussed with NP.       Rosa Callaway MD [facility]

## 2020-02-24 NOTE — CARE COORDINATION
Chart reviewed, in to see pt for dc planning. Introduced self and board updated. Pt was admitted for CP and is having a stress test today. States she lives at home alone and has good support from her daughter. Explained she follows with PCP with [facility], has insurance with affordable RX co-pays and reliable transportation per family. Discussed [facility] and pt states she believes providers just started coming to her home but is not sure of the company name. Declined any other discharge needs; CM remains available if needs arise. 11:45 AM  Spoke with Visiting Physician who confirmed patient has Dr. Nathaniel Nino per their service. Attempted to confirm [address] without success.

## 2020-02-25 LAB
DIFFERENTIAL TYPE: ABNORMAL
GLUCOSE BLD-MCNC: 150 MG/DL (ref 70–99)
GLUCOSE BLD-MCNC: 226 MG/DL (ref 70–99)
GLUCOSE BLD-MCNC: 268 MG/DL (ref 70–99)
GLUCOSE BLD-MCNC: 79 MG/DL (ref 70–99)
HAPTOGLOBIN: 44 MG/DL (ref 30–200)
HAPTOGLOBIN: NORMAL MG/DL (ref 30–200)
HCT VFR BLD CALC: 32.5 % (ref 37–47)
HEMOGLOBIN: 10 GM/DL (ref 12.5–16)
LYMPHOCYTES ABSOLUTE: 0.8 K/CU MM
LYMPHOCYTES RELATIVE PERCENT: 46 % (ref 24–44)
MCH RBC QN AUTO: 29.6 PG (ref 27–31)
MCHC RBC AUTO-ENTMCNC: 30.8 % (ref 32–36)
MCV RBC AUTO: 96.2 FL (ref 78–100)
MONOCYTES ABSOLUTE: 0.1 K/CU MM
MONOCYTES RELATIVE PERCENT: 7 % (ref 0–4)
MYELOCYTE PERCENT: 1 %
MYELOCYTES ABSOLUTE COUNT: 0.02 K/CU MM
PDW BLD-RTO: 14.6 % (ref 11.7–14.9)
PLATELET # BLD: 85 K/CU MM (ref 140–440)
PMV BLD AUTO: 10.9 FL (ref 7.5–11.1)
RBC # BLD: 3.38 M/CU MM (ref 4.2–5.4)
RHEUMATOID FACTOR: 10 IU/ML (ref 0–14)
RHEUMATOID FACTOR: NORMAL IU/ML (ref 0–14)
SEGMENTED NEUTROPHILS ABSOLUTE COUNT: 0.8 K/CU MM
SEGMENTED NEUTROPHILS RELATIVE PERCENT: 46 % (ref 36–66)
WBC # BLD: ABNORMAL K/CU MM (ref 4–10.5)

## 2020-02-25 PROCEDURE — 2580000003 HC RX 258: Performed by: NURSE PRACTITIONER

## 2020-02-25 PROCEDURE — 99232 SBSQ HOSP IP/OBS MODERATE 35: CPT | Performed by: INTERNAL MEDICINE

## 2020-02-25 PROCEDURE — 85007 BL SMEAR W/DIFF WBC COUNT: CPT

## 2020-02-25 PROCEDURE — 1200000000 HC SEMI PRIVATE

## 2020-02-25 PROCEDURE — 6370000000 HC RX 637 (ALT 250 FOR IP): Performed by: INTERNAL MEDICINE

## 2020-02-25 PROCEDURE — 85027 COMPLETE CBC AUTOMATED: CPT

## 2020-02-25 PROCEDURE — APPSS60 APP SPLIT SHARED TIME 46-60 MINUTES: Performed by: NURSE PRACTITIONER

## 2020-02-25 PROCEDURE — 82962 GLUCOSE BLOOD TEST: CPT

## 2020-02-25 PROCEDURE — 6370000000 HC RX 637 (ALT 250 FOR IP): Performed by: NURSE PRACTITIONER

## 2020-02-25 RX ADMIN — FERROUS GLUCONATE TAB 324 MG (37.5 MG ELEMENTAL IRON) 324 MG: 324 (37.5 FE) TAB at 20:22

## 2020-02-25 RX ADMIN — FERROUS GLUCONATE TAB 324 MG (37.5 MG ELEMENTAL IRON) 324 MG: 324 (37.5 FE) TAB at 08:47

## 2020-02-25 RX ADMIN — PANTOPRAZOLE SODIUM 40 MG: 40 TABLET, DELAYED RELEASE ORAL at 06:35

## 2020-02-25 RX ADMIN — ALPRAZOLAM 0.5 MG: 0.5 TABLET ORAL at 08:47

## 2020-02-25 RX ADMIN — MIRTAZAPINE 15 MG: 15 TABLET, FILM COATED ORAL at 20:21

## 2020-02-25 RX ADMIN — SODIUM CHLORIDE, PRESERVATIVE FREE 10 ML: 5 INJECTION INTRAVENOUS at 20:21

## 2020-02-25 RX ADMIN — SODIUM CHLORIDE, PRESERVATIVE FREE 10 ML: 5 INJECTION INTRAVENOUS at 08:48

## 2020-02-25 RX ADMIN — TRAZODONE HYDROCHLORIDE 100 MG: 50 TABLET ORAL at 20:21

## 2020-02-25 RX ADMIN — BUSPIRONE HYDROCHLORIDE 15 MG: 5 TABLET ORAL at 20:22

## 2020-02-25 RX ADMIN — ATORVASTATIN CALCIUM 20 MG: 20 TABLET, FILM COATED ORAL at 20:21

## 2020-02-25 RX ADMIN — CARVEDILOL 25 MG: 25 TABLET, FILM COATED ORAL at 08:47

## 2020-02-25 RX ADMIN — INSULIN LISPRO 3 UNITS: 100 INJECTION, SOLUTION INTRAVENOUS; SUBCUTANEOUS at 17:44

## 2020-02-25 RX ADMIN — LOSARTAN POTASSIUM 50 MG: 50 TABLET ORAL at 08:47

## 2020-02-25 RX ADMIN — BUSPIRONE HYDROCHLORIDE 15 MG: 5 TABLET ORAL at 08:47

## 2020-02-25 RX ADMIN — CARVEDILOL 25 MG: 25 TABLET, FILM COATED ORAL at 17:45

## 2020-02-25 RX ADMIN — ESCITALOPRAM OXALATE 20 MG: 10 TABLET ORAL at 08:47

## 2020-02-25 RX ADMIN — INSULIN LISPRO 1 UNITS: 100 INJECTION, SOLUTION INTRAVENOUS; SUBCUTANEOUS at 08:48

## 2020-02-25 RX ADMIN — ALPRAZOLAM 0.5 MG: 0.5 TABLET ORAL at 20:22

## 2020-02-25 ASSESSMENT — PAIN DESCRIPTION - ORIENTATION: ORIENTATION: LEFT

## 2020-02-25 ASSESSMENT — PAIN SCALES - GENERAL
PAINLEVEL_OUTOF10: 4
PAINLEVEL_OUTOF10: 0

## 2020-02-25 ASSESSMENT — PAIN DESCRIPTION - PAIN TYPE: TYPE: ACUTE PAIN

## 2020-02-25 ASSESSMENT — PAIN DESCRIPTION - LOCATION: LOCATION: SHOULDER

## 2020-02-25 ASSESSMENT — PAIN DESCRIPTION - PROGRESSION: CLINICAL_PROGRESSION: NOT CHANGED

## 2020-02-25 NOTE — CARE COORDINATION
CM reviewed chart and saw pt. Pt's states she really doesn't understand everything that is going on and why she can't eat. CM notified pt's RN Elizabeth Garcia RN when into room and discussed procured scheduled for the day. CM will need to follow. CT surgery has been consulted.

## 2020-02-25 NOTE — CONSULTS
Department of Cardiovascular & Thoracic Surgery   [facility] Consult Note        Reason for Consult:  AS  Requesting Physician:  ***  Admitting Physician: ***    Date of Consult: 02/25/20      History Obtained From:  {HISTORY SOURCE:[id number]::\"patient\"}    HISTORY OF PRESENT ILLNESS:    The patient is a  78 y.o. female with AS. Patient admits to *** over the past  ***. Echo was significant for ***. Cardiologist requesting consult for evaluation of AS. Past Medical History:        Diagnosis Date    Anxiety     Cellulitis     foot.  Constipation     Depression     Diabetes mellitus ([address]     Hyperlipidemia     Hypertension     Insomnia     Rosacea     Sinus tachycardia      Past Surgical History:        Procedure Laterality Date    BACK SURGERY      HYSTERECTOMY      TOE SURGERY       Current Medications:   No current facility-administered medications on file prior to encounter. Current Outpatient Medications on File Prior to Encounter   Medication Sig Dispense Refill    traZODone (DESYREL) 50 MG tablet Take  mg by mouth nightly       ALPRAZolam (XANAX) 0.5 MG tablet Take 0.5 mg by mouth 3 times daily as needed for Anxiety.        aspirin 81 MG EC tablet Take 81 mg by mouth daily      omeprazole (PRILOSEC) 20 MG delayed release capsule Take 20 mg by mouth daily      simvastatin (ZOCOR) 40 MG tablet Take 40 mg by mouth nightly      losartan (COZAAR) 50 MG tablet Take 1 tablet by mouth daily 30 tablet 0    carvedilol (COREG) 25 MG tablet Take 1 tablet by mouth 2 times daily (with meals) 60 tablet 0    mirtazapine (REMERON) 15 MG tablet Take 1 tablet by mouth nightly 30 tablet 0    dapagliflozin (FARXIGA) 10 MG tablet Take 10 mg by mouth every morning      sitaGLIPtin-metFORMIN (JANUMET XR)  MG TB24 tablet Take 1 tablet by mouth 2 times daily      BUSPIRONE HCL PO Take 15 mg by mouth 2 times daily       escitalopram (LEXAPRO) 20 MG tablet Take 20 mg by mouth daily INTAKE/OUTPUT:      Intake/Output Summary (Last 24 hours) at 2/25/2020 1158  Last data filed at 2/25/2020 0815  Gross per 24 hour   Intake 420 ml   Output --   Net 420 ml       General appearance: No apparent distress, appears stated age and cooperative. Skin: unremarkable, warm and dry  HEENT Normocephalic, atraumatic without obvious deformity. Conjunctiva normal, EOMI, hearing intact  Neck: Supple, Trachea midline   Lungs: Good respiratory effort. Clear to auscultation, bilaterally  Heart: Regular rate/ rhythm, +JOSE   Abdomen: Soft, non-tender or non-distended   Extremities: ***edema warm well perfused  Neurologic: Alert, grossly intact, no sensory deficit  Psych/Mental status: normal affect        DATA:  ***    IMPRESSION  Patient Active Problem List   Diagnosis    Dizziness    Difficulty swallowing    PVC (premature ventricular contraction)    Diabetes mellitus ([address]    Hypertension    Hyperlipemia    Major depression    Pain of right lower extremity    Hematoma of right thigh    Primary osteoarthritis of both knees    Uncontrolled pain    Chest pain       ***      RECOMMENDATIONS:    Discussed AS. Patient would be *** risk for potential SAVR based on ***. Recommend proceed with workup for possible TAVR. ***Will plan to discuss at next multidisciplinary conference and *** make final recommendation at that time. Thank you for the opportunity to assist in the care of this patient.

## 2020-02-25 NOTE — PROGRESS NOTES
Systems:   All 14 systems were reviewed and are negative  Except for the positive findings which are documented     /65   Pulse 58   Temp 98.3 °F (36.8 °C) (Oral)   Resp 19   Ht 5' 4\" (1.626 m)   Wt 138 lb 4.8 oz (62.7 kg)   SpO2 94%   BMI 23.74 kg/m²       Intake/Output Summary (Last 24 hours) at 2/25/2020 1520  Last data filed at 2/25/2020 0815  Gross per 24 hour   Intake 420 ml   Output --   Net 420 ml       Physical Exam:  Constitutional:  Well developed, Well nourished, No acute distress  HENT:  Normocephalic, Atraumatic, Bilateral external ears normal,  Nose normal.   Neck- trachea is midline  Eyes:  PERRL, Conjunctiva normal  Respiratory:  Normal breath sounds, No respiratory distress, No wheezing, No chest tenderness. Cardiovascular:  Normal heart rate, Normal rhythm, yes murmurs appreciated, No rubs appreciated, No gallops appreciated, JVP not elevated  Abdomen/GI:  Soft, No tenderness  Musculoskeletal:  Intact distal pulses, no edema, No tenderness, No cyanosis, No clubbing. Integument:  Warm, Dry  Lymphatic:  No lymphadenopathy noted.    Neurologic:  Alert & oriented  Psychiatric:  Affect and Mood :pleasant     Medications:    ferrous gluconate  324 mg Oral BID    busPIRone  15 mg Oral BID    mirtazapine  15 mg Oral Nightly    losartan  50 mg Oral Daily    carvedilol  25 mg Oral BID WC    atorvastatin  20 mg Oral Nightly    traZODone  100 mg Oral Nightly    pantoprazole  40 mg Oral QAM AC    escitalopram  20 mg Oral Daily    aspirin  81 mg Oral Daily    sodium chloride flush  10 mL Intravenous 2 times per day    insulin lispro  0-6 Units Subcutaneous TID     insulin lispro  0-3 Units Subcutaneous Nightly      dextrose       ondansetron, ALPRAZolam, sodium chloride flush, acetaminophen, acetaminophen, polyethylene glycol, promethazine, ondansetron, glucose, dextrose, glucagon (rDNA), dextrose    Lab Data:  CBC:   Recent Labs     02/23/20  0347 02/24/20  0647 02/25/20  0306 WBC 2.1* 1.6  WBC CALLED TO JOCELYN RN @ [phone] [id number] CORNELIUS MLT  RESULTS READ BACK  * 1.7  WBC CALLED TO 3E TO EYAL PATTERSONRN AT 03:32 ON 2.25.20 BY MARCO ANTONIO BERRIOSS  RESULTS READ BACK  *   HGB 11.0* 11.1* 10.0*   HCT 34.5* 36.3* 32.5*   MCV 93.5 96.5 96.2   * 94* 85*     BMP:   Recent Labs     02/23/20  0347 02/24/20  0647    144   K 3.8 3.9    106   CO2 28 29   BUN 13 12   CREATININE 1.0 1.0     PT/INR:   No results for input(s): PROTIME, INR in the last 72 hours. BNP:    No results for input(s): PROBNP in the last 72 hours. TROPONIN:   No results for input(s): TROPONINT in the last 72 hours. Impression:  Active Problems:    Chest pain  Resolved Problems:    * No resolved hospital problems. *       All labs, medications and tests reviewed by myself, continue all other medications of all above medical condition listed as is except for changes mentioned above. Thank you   Please call with questions. Electronically signed by Link Marino. WES Edwards CNP on 2/25/2020 at 3:20 PM     I have seen ,spoken to  and examined this patient personally, independently of the nurse practitioner. I have reviewed the hospital care given to date and reviewed all pertinent labs and imaging. The plan was developed mutually at the time of the visit with the patient,  NP  and myself. I have spoken with patient, nursing staff and provided written and verbal instructions . The above note has been reviewed and I agree with the assessment, diagnosis, and treatment plan with changes made by me as follows     CARDIOLOGY ATTENDING ADDENDUM    HPI:  I have reviewed the above HPI  And agree with above   Marilyn Gr is a 78 y. o.year old who and presents with had concerns including Shortness of Breath and Irregular Heart Beat.   Chief Complaint   Patient presents with    Shortness of Breath    Irregular Heart Beat     Interval history:  Patient is confused, has severe AS on echo, will recommend to re-evalute mental condition before considering TAVR    Physical Exam:  General:  Awake, alert, NAD  Head:normal  Eye:normal  Neck:  No JVD   Chest:  Clear to auscultation, respiration easy  Cardiovascular:  RRR S1S2+ MURMUR  Abdomen:   nontender  Extremities:  no edema  Pulses; palpable  Neuro: CONFUSED    MEDICAL DECISION MAKING;    I agree with the above plan, which was planned by myself and discussed with NP.

## 2020-02-26 PROBLEM — F51.04 PSYCHOPHYSIOLOGICAL INSOMNIA: Chronic | Status: ACTIVE | Noted: 2020-02-26

## 2020-02-26 PROBLEM — F41.1 GENERALIZED ANXIETY DISORDER: Chronic | Status: ACTIVE | Noted: 2020-02-26

## 2020-02-26 LAB
ANTI-NUCLEAR ANTIBODY (ANA): NORMAL
ANTI-NUCLEAR ANTIBODY (ANA): NORMAL
BASE EXCESS MIXED: ABNORMAL (ref 0–2.3)
BASOPHILS ABSOLUTE: 0 K/CU MM
BASOPHILS RELATIVE PERCENT: 0.7 % (ref 0–1)
CARBON MONOXIDE, BLOOD: 0.2 % (ref 0–5)
CO2 CONTENT: 28.9 MMOL/L (ref 19–24)
COMMENT: ABNORMAL
DIFFERENTIAL TYPE: ABNORMAL
EOSINOPHILS ABSOLUTE: 0.1 K/CU MM
EOSINOPHILS RELATIVE PERCENT: 4.3 % (ref 0–3)
ESTIMATED AVERAGE GLUCOSE: 126 MG/DL
GLUCOSE BLD-MCNC: 111 MG/DL (ref 70–99)
GLUCOSE BLD-MCNC: 113 MG/DL (ref 70–99)
GLUCOSE BLD-MCNC: 193 MG/DL (ref 70–99)
GLUCOSE BLD-MCNC: 94 MG/DL (ref 70–99)
HBA1C MFR BLD: 6 % (ref 4.2–6.3)
HCO3 ARTERIAL: 27.7 MMOL/L (ref 18–23)
HCT VFR BLD CALC: 33.4 % (ref 37–47)
HEMOGLOBIN: 10.2 GM/DL (ref 12.5–16)
IMMATURE NEUTROPHIL %: 0.7 % (ref 0–0.43)
LYMPHOCYTES ABSOLUTE: 0.5 K/CU MM
LYMPHOCYTES RELATIVE PERCENT: 32.9 % (ref 24–44)
MCH RBC QN AUTO: 29.1 PG (ref 27–31)
MCHC RBC AUTO-ENTMCNC: 30.5 % (ref 32–36)
MCV RBC AUTO: 95.4 FL (ref 78–100)
METHEMOGLOBIN ARTERIAL: 0.2 %
MONOCYTES ABSOLUTE: 0.2 K/CU MM
MONOCYTES RELATIVE PERCENT: 12.1 % (ref 0–4)
NUCLEATED RBC %: 0 %
O2 SATURATION: 94.3 % (ref 96–97)
PCO2 ARTERIAL: 38 MMHG (ref 32–45)
PDW BLD-RTO: 14.8 % (ref 11.7–14.9)
PH BLOOD: 7.47 (ref 7.34–7.45)
PLATELET # BLD: 86 K/CU MM (ref 140–440)
PMV BLD AUTO: 10.6 FL (ref 7.5–11.1)
PO2 ARTERIAL: 71 MMHG (ref 75–100)
RBC # BLD: 3.5 M/CU MM (ref 4.2–5.4)
SEGMENTED NEUTROPHILS ABSOLUTE COUNT: 0.7 K/CU MM
SEGMENTED NEUTROPHILS RELATIVE PERCENT: 49.3 % (ref 36–66)
TOTAL IMMATURE NEUTOROPHIL: 0.01 K/CU MM
TOTAL NUCLEATED RBC: 0 K/CU MM
WBC # BLD: ABNORMAL K/CU MM (ref 4–10.5)

## 2020-02-26 PROCEDURE — 36415 COLL VENOUS BLD VENIPUNCTURE: CPT

## 2020-02-26 PROCEDURE — 2580000003 HC RX 258: Performed by: INTERNAL MEDICINE

## 2020-02-26 PROCEDURE — 2580000003 HC RX 258

## 2020-02-26 PROCEDURE — 82803 BLOOD GASES ANY COMBINATION: CPT

## 2020-02-26 PROCEDURE — 4A023N8 MEASUREMENT OF CARDIAC SAMPLING AND PRESSURE, BILATERAL, PERCUTANEOUS APPROACH: ICD-10-PCS | Performed by: INTERNAL MEDICINE

## 2020-02-26 PROCEDURE — 6370000000 HC RX 637 (ALT 250 FOR IP): Performed by: INTERNAL MEDICINE

## 2020-02-26 PROCEDURE — B2111ZZ FLUOROSCOPY OF MULTIPLE CORONARY ARTERIES USING LOW OSMOLAR CONTRAST: ICD-10-PCS | Performed by: INTERNAL MEDICINE

## 2020-02-26 PROCEDURE — 94761 N-INVAS EAR/PLS OXIMETRY MLT: CPT

## 2020-02-26 PROCEDURE — 85025 COMPLETE CBC W/AUTO DIFF WBC: CPT

## 2020-02-26 PROCEDURE — 6360000004 HC RX CONTRAST MEDICATION

## 2020-02-26 PROCEDURE — 2709999900 HC NON-CHARGEABLE SUPPLY

## 2020-02-26 PROCEDURE — 93460 R&L HRT ART/VENTRICLE ANGIO: CPT

## 2020-02-26 PROCEDURE — 2500000003 HC RX 250 WO HCPCS

## 2020-02-26 PROCEDURE — B2151ZZ FLUOROSCOPY OF LEFT HEART USING LOW OSMOLAR CONTRAST: ICD-10-PCS | Performed by: INTERNAL MEDICINE

## 2020-02-26 PROCEDURE — 99232 SBSQ HOSP IP/OBS MODERATE 35: CPT | Performed by: INTERNAL MEDICINE

## 2020-02-26 PROCEDURE — C1751 CATH, INF, PER/CENT/MIDLINE: HCPCS

## 2020-02-26 PROCEDURE — 6360000002 HC RX W HCPCS: Performed by: HOSPITALIST

## 2020-02-26 PROCEDURE — 99221 1ST HOSP IP/OBS SF/LOW 40: CPT | Performed by: NURSE PRACTITIONER

## 2020-02-26 PROCEDURE — 6370000000 HC RX 637 (ALT 250 FOR IP): Performed by: NURSE PRACTITIONER

## 2020-02-26 PROCEDURE — C1894 INTRO/SHEATH, NON-LASER: HCPCS

## 2020-02-26 PROCEDURE — 93460 R&L HRT ART/VENTRICLE ANGIO: CPT | Performed by: INTERNAL MEDICINE

## 2020-02-26 PROCEDURE — 1200000000 HC SEMI PRIVATE

## 2020-02-26 PROCEDURE — C1769 GUIDE WIRE: HCPCS

## 2020-02-26 PROCEDURE — 6360000002 HC RX W HCPCS

## 2020-02-26 PROCEDURE — 2580000003 HC RX 258: Performed by: NURSE PRACTITIONER

## 2020-02-26 PROCEDURE — 83036 HEMOGLOBIN GLYCOSYLATED A1C: CPT

## 2020-02-26 PROCEDURE — 82962 GLUCOSE BLOOD TEST: CPT

## 2020-02-26 RX ORDER — SODIUM CHLORIDE 9 MG/ML
INJECTION, SOLUTION INTRAVENOUS
Status: COMPLETED
Start: 2020-02-26 | End: 2020-02-26

## 2020-02-26 RX ORDER — BUSPIRONE HYDROCHLORIDE 15 MG/1
15 TABLET ORAL 3 TIMES DAILY
Status: DISCONTINUED | OUTPATIENT
Start: 2020-02-27 | End: 2020-02-27 | Stop reason: HOSPADM

## 2020-02-26 RX ORDER — SODIUM CHLORIDE 0.9 % (FLUSH) 0.9 %
10 SYRINGE (ML) INJECTION PRN
Status: DISCONTINUED | OUTPATIENT
Start: 2020-02-26 | End: 2020-02-26 | Stop reason: SDUPTHER

## 2020-02-26 RX ORDER — SODIUM CHLORIDE 9 MG/ML
INJECTION, SOLUTION INTRAVENOUS CONTINUOUS
Status: DISCONTINUED | OUTPATIENT
Start: 2020-02-26 | End: 2020-02-26 | Stop reason: SDUPTHER

## 2020-02-26 RX ORDER — NITROGLYCERIN 0.4 MG/1
0.4 TABLET SUBLINGUAL EVERY 5 MIN PRN
Status: DISCONTINUED | OUTPATIENT
Start: 2020-02-26 | End: 2020-02-27 | Stop reason: HOSPADM

## 2020-02-26 RX ORDER — KETOROLAC TROMETHAMINE 30 MG/ML
15 INJECTION, SOLUTION INTRAMUSCULAR; INTRAVENOUS EVERY 6 HOURS PRN
Status: DISCONTINUED | OUTPATIENT
Start: 2020-02-26 | End: 2020-02-27 | Stop reason: HOSPADM

## 2020-02-26 RX ORDER — DIAZEPAM 5 MG/1
5 TABLET ORAL
Status: COMPLETED | OUTPATIENT
Start: 2020-02-26 | End: 2020-02-26

## 2020-02-26 RX ORDER — SODIUM CHLORIDE 0.9 % (FLUSH) 0.9 %
10 SYRINGE (ML) INJECTION EVERY 12 HOURS SCHEDULED
Status: DISCONTINUED | OUTPATIENT
Start: 2020-02-26 | End: 2020-02-27 | Stop reason: HOSPADM

## 2020-02-26 RX ORDER — SODIUM CHLORIDE 9 MG/ML
INJECTION, SOLUTION INTRAVENOUS CONTINUOUS
Status: DISCONTINUED | OUTPATIENT
Start: 2020-02-26 | End: 2020-02-27

## 2020-02-26 RX ORDER — TRAZODONE HYDROCHLORIDE 50 MG/1
50 TABLET ORAL NIGHTLY
Status: DISCONTINUED | OUTPATIENT
Start: 2020-02-27 | End: 2020-02-27 | Stop reason: HOSPADM

## 2020-02-26 RX ORDER — ACETAMINOPHEN 325 MG/1
650 TABLET ORAL EVERY 4 HOURS PRN
Status: DISCONTINUED | OUTPATIENT
Start: 2020-02-26 | End: 2020-02-27 | Stop reason: HOSPADM

## 2020-02-26 RX ORDER — LORAZEPAM 0.5 MG/1
0.5 TABLET ORAL EVERY 6 HOURS PRN
Status: DISCONTINUED | OUTPATIENT
Start: 2020-02-26 | End: 2020-02-27 | Stop reason: HOSPADM

## 2020-02-26 RX ORDER — DIPHENHYDRAMINE HCL 25 MG
25 TABLET ORAL
Status: COMPLETED | OUTPATIENT
Start: 2020-02-26 | End: 2020-02-26

## 2020-02-26 RX ORDER — SODIUM CHLORIDE 0.9 % (FLUSH) 0.9 %
10 SYRINGE (ML) INJECTION EVERY 12 HOURS SCHEDULED
Status: DISCONTINUED | OUTPATIENT
Start: 2020-02-26 | End: 2020-02-26 | Stop reason: SDUPTHER

## 2020-02-26 RX ORDER — SODIUM CHLORIDE 0.9 % (FLUSH) 0.9 %
10 SYRINGE (ML) INJECTION PRN
Status: DISCONTINUED | OUTPATIENT
Start: 2020-02-26 | End: 2020-02-27 | Stop reason: HOSPADM

## 2020-02-26 RX ADMIN — DIAZEPAM 5 MG: 5 TABLET ORAL at 11:51

## 2020-02-26 RX ADMIN — ALPRAZOLAM 0.5 MG: 0.5 TABLET ORAL at 08:39

## 2020-02-26 RX ADMIN — BUSPIRONE HYDROCHLORIDE 15 MG: 5 TABLET ORAL at 08:39

## 2020-02-26 RX ADMIN — SODIUM CHLORIDE, PRESERVATIVE FREE 10 ML: 5 INJECTION INTRAVENOUS at 21:23

## 2020-02-26 RX ADMIN — KETOROLAC TROMETHAMINE 15 MG: 30 INJECTION, SOLUTION INTRAMUSCULAR; INTRAVENOUS at 14:42

## 2020-02-26 RX ADMIN — FERROUS GLUCONATE TAB 324 MG (37.5 MG ELEMENTAL IRON) 324 MG: 324 (37.5 FE) TAB at 19:49

## 2020-02-26 RX ADMIN — DIPHENHYDRAMINE HYDROCHLORIDE 25 MG: 25 TABLET ORAL at 11:51

## 2020-02-26 RX ADMIN — SODIUM CHLORIDE: 9 INJECTION, SOLUTION INTRAVENOUS at 09:00

## 2020-02-26 RX ADMIN — BUSPIRONE HYDROCHLORIDE 15 MG: 5 TABLET ORAL at 19:49

## 2020-02-26 RX ADMIN — ATORVASTATIN CALCIUM 20 MG: 20 TABLET, FILM COATED ORAL at 19:49

## 2020-02-26 RX ADMIN — LOSARTAN POTASSIUM 50 MG: 50 TABLET ORAL at 08:39

## 2020-02-26 RX ADMIN — ESCITALOPRAM OXALATE 20 MG: 10 TABLET ORAL at 14:42

## 2020-02-26 RX ADMIN — SODIUM CHLORIDE, PRESERVATIVE FREE 10 ML: 5 INJECTION INTRAVENOUS at 08:41

## 2020-02-26 RX ADMIN — FERROUS GLUCONATE TAB 324 MG (37.5 MG ELEMENTAL IRON) 324 MG: 324 (37.5 FE) TAB at 14:42

## 2020-02-26 RX ADMIN — SODIUM CHLORIDE: 9 INJECTION, SOLUTION INTRAVENOUS at 14:41

## 2020-02-26 RX ADMIN — ACETAMINOPHEN 650 MG: 325 TABLET ORAL at 22:32

## 2020-02-26 RX ADMIN — INSULIN LISPRO 1 UNITS: 100 INJECTION, SOLUTION INTRAVENOUS; SUBCUTANEOUS at 17:15

## 2020-02-26 RX ADMIN — TRAZODONE HYDROCHLORIDE 100 MG: 50 TABLET ORAL at 19:49

## 2020-02-26 ASSESSMENT — PAIN DESCRIPTION - PROGRESSION
CLINICAL_PROGRESSION: NOT CHANGED

## 2020-02-26 ASSESSMENT — PAIN SCALES - GENERAL
PAINLEVEL_OUTOF10: 3
PAINLEVEL_OUTOF10: 10
PAINLEVEL_OUTOF10: 0

## 2020-02-26 NOTE — PROGRESS NOTES
HOSPITALIST PROGRESS NOTE  Date: 2/26/2020   Name: Claudy Burris   MRN: [id number]   YOB: 1940  Chief Complaint   Patient presents with    Shortness of Breath    Irregular Heart Beat       Attending Progress Note      SUBJECTIVE:   -patient is thirsty, she is hoping her procedure happens soon.  She says that her left side of her ribs hurts    OBJECTIVE      Medications    Current Facility-Administered Medications: 0.9 % sodium chloride infusion, , Intravenous, Continuous  sodium chloride flush 0.9 % injection 10 mL, 10 mL, Intravenous, 2 times per day  sodium chloride flush 0.9 % injection 10 mL, 10 mL, Intravenous, PRN  diphenhydrAMINE (BENADRYL) tablet 25 mg, 25 mg, Oral, Once PRN  diazePAM (VALIUM) tablet 5 mg, 5 mg, Oral, Once PRN  sodium chloride 0.9 % infusion, , ,   ferrous gluconate 324 (37.5 Fe) MG tablet 324 mg, 324 mg, Oral, BID  ondansetron (ZOFRAN) injection 4 mg, 4 mg, Intravenous, Q30 Min PRN  busPIRone (BUSPAR) tablet 15 mg, 15 mg, Oral, BID  [Held by provider] mirtazapine (REMERON) tablet 15 mg, 15 mg, Oral, Nightly  losartan (COZAAR) tablet 50 mg, 50 mg, Oral, Daily  carvedilol (COREG) tablet 25 mg, 25 mg, Oral, BID WC  atorvastatin (LIPITOR) tablet 20 mg, 20 mg, Oral, Nightly  traZODone (DESYREL) tablet 100 mg, 100 mg, Oral, Nightly  pantoprazole (PROTONIX) tablet 40 mg, 40 mg, Oral, QAM AC  escitalopram (LEXAPRO) tablet 20 mg, 20 mg, Oral, Daily  aspirin EC tablet 81 mg, 81 mg, Oral, Daily  ALPRAZolam (XANAX) tablet 0.5 mg, 0.5 mg, Oral, TID PRN  sodium chloride flush 0.9 % injection 10 mL, 10 mL, Intravenous, 2 times per day  sodium chloride flush 0.9 % injection 10 mL, 10 mL, Intravenous, PRN  acetaminophen (TYLENOL) tablet 650 mg, 650 mg, Oral, Q6H PRN  acetaminophen (TYLENOL) suppository 650 mg, 650 mg, Rectal, Q6H PRN  polyethylene glycol (GLYCOLAX) packet 17 g, 17 g, Oral, Daily PRN  promethazine (PHENERGAN) tablet 12.5 mg, 12.5 mg, Oral, Q6H PRN  ondansetron (ZOFRAN)

## 2020-02-26 NOTE — PROGRESS NOTES
ONCOLOGY HEMATOLOGY CARE ([facility])  PROGRESS NOTE      2020  12:37 PM    Patient:    Carroll Baum  : 1940   78 y.o. MRN: [id number]  Admitted: 2020  1:25 PM ATT: Clarita Smith MD   Cath Lab/NONE  AdmitDx: Abdominal pain, epigastric [R10.13]  Dizziness [R42]  Chest pain [R07.9]  Abnormal heart rate [R00.9]  Leukopenia, unspecified type [D72.819]  Chest pain, unspecified type [R07.9]  Compression fracture of L2 vertebra, initial encounter ([facility] [S32.020A]  Chest pain [R07.9]  PCP: Umer Mireles MD      Patient was seen and examined today. No fever. No cough,  sx, diarrhea. No frequent infections per her. No bleeding/ Reported chest pain, pressure and sob. REVIEW OF SYSTEMS    Constitutional:  Denies fever, chills, loss of appetite, weight loss  HEENT:  Denies swelling of neck glands  Respiratory:  Denies cough, shortness of breath or hemoptysis  Cardiovascular:  Denies chest pain, palpitations or swelling   GI:  Denies abdominal pain, nausea, vomiting, constipation or diarrhea   Musculoskeletal:  Denies back pain   Skin:  Denies rash   Neurologic:  Denies headache, focal weakness or sensory changes   PSYCHIATRIC:  Neg depression, personality changes, anxiety.   ALL/IMM:  Neg reactions to drugs other than listed    All systems negative except  for symptoms of HPI and as marked as above    PHYSICAL EXAM :    Vitals: BP (!) 133/55   Pulse 57   Temp 97.5 °F (36.4 °C) (Oral)   Resp 15   Ht 5' 4\" (1.626 m)   Wt 138 lb (62.6 kg)   SpO2 94%   BMI 23.69 kg/m²     CONSTITUTIONAL: awake, alert, anxious  EYES:no palor or any icetrus  ENT: ATNC  NECK: No JVD  HEMATOLOGIC/LYMPHATIC: no cervical, supraclavicular or axillary lymphadenopathy   LUNGS:ctab  CARDIOVASCULAR:s1s2 SM+  ABDOMEN:soft ntnd bs pos  MUSCULOSKELETAL: full range of motion noted, tone is normal  NEUROLOGIC: GI  SKIN:few echymosis  EXTREMITIES: no LE edema bilaterally    LABORATORY RESULTS  CBC:   Recent Labs 02/24/20  0647 02/25/20  0306 02/26/20  1010   WBC 1.6  WBC CALLED TO JOCELYN RN @ [phone] [id number] CORNELIUS MLT  RESULTS READ BACK  * 1.7  WBC CALLED TO 3E TO EYAL PATTERSONRN AT 03:32 ON 2.25.20 BY CARLOS ZHANGMLS  RESULTS READ BACK  * 1.4  WBC CALLED TO BRANDI AGRAWAL RN @ 1106 [id number] BY DIONNE MLT  RESULTS READ BACK  *   HGB 11.1* 10.0* 10.2*   PLT 94* 85* 86*     BMP:    Recent Labs     02/24/20  0647      K 3.9      CO2 29   BUN 12   CREATININE 1.0   GLUCOSE 120*     Hepatic: No results for input(s): AST, ALT, ALB, BILITOT, ALKPHOS in the last 72 hours. INR: No results for input(s): INR in the last 72 hours. RADIOLOGY REPORTS  2/21/2020 ultrasound of the abdomen revealed cholelithiasis and gallbladder sludge.  Liver cirrhosis.  CT of the head was normal.  CT scan of the abdomen pelvis revealed L2 superior endplate compression fracture.  Cirrhosis with a small volume of ascites and evidence of portal hypertension including mild splenomegaly and paraesophageal varices.  Cholelithiasis. 2/24/20: Stress test:   Summary    Normal perfusion study with normal distribution in all coronal, short, and    horizontal axis.    The observed defect is consistent with diaphragmatic attenuation.    Normal LV function. LVEF 65 %. ASSESSMENT & RECOMMENDATIONS:  Pancytopenia: At least since October 2019.  Medications reviewed.  Peripheral smear with no hemolysis, blasts.  Could be secondary to underlying cirrhosis, antipsychotics.  Note low iron, r/o blood loss. Note UA neg. FOB ordered. Recommend oral iron supplementation. No hemolysis per labs or peripheral smear. Rule out connective tissue disorder, rheumatoid arthritis, monoclonal gammopathy.  No thyroid dysfunction.  Discussed ruling out hepatitis and HIV but patient defers.  May have underlying MDS.  Not sure whether antipsychotics can be adjusted, recommend psychiatry evaluation.  Consider further evaluation including bone marrow biopsy,

## 2020-02-27 VITALS
BODY MASS INDEX: 24.01 KG/M2 | SYSTOLIC BLOOD PRESSURE: 140 MMHG | TEMPERATURE: 98 F | RESPIRATION RATE: 14 BRPM | OXYGEN SATURATION: 94 % | DIASTOLIC BLOOD PRESSURE: 63 MMHG | WEIGHT: 140.6 LBS | HEART RATE: 59 BPM | HEIGHT: 64 IN

## 2020-02-27 LAB
BASOPHILS ABSOLUTE: 0 K/CU MM
BASOPHILS RELATIVE PERCENT: 0.5 % (ref 0–1)
DIFFERENTIAL TYPE: ABNORMAL
EOSINOPHILS ABSOLUTE: 0.1 K/CU MM
EOSINOPHILS RELATIVE PERCENT: 2.9 % (ref 0–3)
GLUCOSE BLD-MCNC: 110 MG/DL (ref 70–99)
GLUCOSE BLD-MCNC: 122 MG/DL (ref 70–99)
GLUCOSE BLD-MCNC: 148 MG/DL (ref 70–99)
GLUCOSE BLD-MCNC: 249 MG/DL (ref 70–99)
HCT VFR BLD CALC: 36.3 % (ref 37–47)
HEMOGLOBIN: 11.3 GM/DL (ref 12.5–16)
IMMATURE NEUTROPHIL %: 0.5 % (ref 0–0.43)
LYMPHOCYTES ABSOLUTE: 0.5 K/CU MM
LYMPHOCYTES RELATIVE PERCENT: 24 % (ref 24–44)
MCH RBC QN AUTO: 30 PG (ref 27–31)
MCHC RBC AUTO-ENTMCNC: 31.1 % (ref 32–36)
MCV RBC AUTO: 96.3 FL (ref 78–100)
MONOCYTES ABSOLUTE: 0.2 K/CU MM
MONOCYTES RELATIVE PERCENT: 11.3 % (ref 0–4)
NUCLEATED RBC %: 0 %
PDW BLD-RTO: 14.8 % (ref 11.7–14.9)
PLATELET # BLD: 120 K/CU MM (ref 140–440)
PMV BLD AUTO: 10.8 FL (ref 7.5–11.1)
RBC # BLD: 3.77 M/CU MM (ref 4.2–5.4)
SEGMENTED NEUTROPHILS ABSOLUTE COUNT: 1.2 K/CU MM
SEGMENTED NEUTROPHILS RELATIVE PERCENT: 60.8 % (ref 36–66)
TOTAL IMMATURE NEUTOROPHIL: 0.01 K/CU MM
TOTAL NUCLEATED RBC: 0 K/CU MM
WBC # BLD: 2 K/CU MM (ref 4–10.5)

## 2020-02-27 PROCEDURE — 94761 N-INVAS EAR/PLS OXIMETRY MLT: CPT

## 2020-02-27 PROCEDURE — 99232 SBSQ HOSP IP/OBS MODERATE 35: CPT | Performed by: INTERNAL MEDICINE

## 2020-02-27 PROCEDURE — 97535 SELF CARE MNGMENT TRAINING: CPT

## 2020-02-27 PROCEDURE — 97162 PT EVAL MOD COMPLEX 30 MIN: CPT

## 2020-02-27 PROCEDURE — 6370000000 HC RX 637 (ALT 250 FOR IP): Performed by: INTERNAL MEDICINE

## 2020-02-27 PROCEDURE — 36415 COLL VENOUS BLD VENIPUNCTURE: CPT

## 2020-02-27 PROCEDURE — APPSS60 APP SPLIT SHARED TIME 46-60 MINUTES: Performed by: NURSE PRACTITIONER

## 2020-02-27 PROCEDURE — 6370000000 HC RX 637 (ALT 250 FOR IP): Performed by: NURSE PRACTITIONER

## 2020-02-27 PROCEDURE — 97166 OT EVAL MOD COMPLEX 45 MIN: CPT

## 2020-02-27 PROCEDURE — 82962 GLUCOSE BLOOD TEST: CPT

## 2020-02-27 PROCEDURE — 97530 THERAPEUTIC ACTIVITIES: CPT

## 2020-02-27 PROCEDURE — 2580000003 HC RX 258: Performed by: INTERNAL MEDICINE

## 2020-02-27 PROCEDURE — 85025 COMPLETE CBC W/AUTO DIFF WBC: CPT

## 2020-02-27 RX ORDER — LORAZEPAM 0.5 MG/1
0.5 TABLET ORAL EVERY 6 HOURS PRN
Qty: 20 TABLET | Refills: 0 | Status: SHIPPED | OUTPATIENT
Start: 2020-02-27 | End: 2020-03-03

## 2020-02-27 RX ORDER — FERROUS GLUCONATE 324(37.5)
324 TABLET ORAL 2 TIMES DAILY
Qty: 60 TABLET | Refills: 0 | Status: SHIPPED | OUTPATIENT
Start: 2020-02-27 | End: 2021-08-02

## 2020-02-27 RX ORDER — BUSPIRONE HYDROCHLORIDE 15 MG/1
15 TABLET ORAL 3 TIMES DAILY
Qty: 90 TABLET | Refills: 0 | Status: SHIPPED | OUTPATIENT
Start: 2020-02-27

## 2020-02-27 RX ORDER — TRAZODONE HYDROCHLORIDE 50 MG/1
50 TABLET ORAL NIGHTLY
Qty: 30 TABLET | Refills: 0 | Status: SHIPPED | OUTPATIENT
Start: 2020-02-27

## 2020-02-27 RX ADMIN — LORAZEPAM 0.5 MG: 0.5 TABLET ORAL at 14:53

## 2020-02-27 RX ADMIN — SODIUM CHLORIDE: 9 INJECTION, SOLUTION INTRAVENOUS at 00:33

## 2020-02-27 RX ADMIN — FERROUS GLUCONATE TAB 324 MG (37.5 MG ELEMENTAL IRON) 324 MG: 324 (37.5 FE) TAB at 09:25

## 2020-02-27 RX ADMIN — PANTOPRAZOLE SODIUM 40 MG: 40 TABLET, DELAYED RELEASE ORAL at 05:39

## 2020-02-27 RX ADMIN — BUSPIRONE HYDROCHLORIDE 15 MG: 15 TABLET ORAL at 13:43

## 2020-02-27 RX ADMIN — ASPIRIN 81 MG: 81 TABLET, COATED ORAL at 09:24

## 2020-02-27 RX ADMIN — INSULIN LISPRO 1 UNITS: 100 INJECTION, SOLUTION INTRAVENOUS; SUBCUTANEOUS at 11:53

## 2020-02-27 RX ADMIN — CARVEDILOL 25 MG: 25 TABLET, FILM COATED ORAL at 16:04

## 2020-02-27 RX ADMIN — BUSPIRONE HYDROCHLORIDE 15 MG: 15 TABLET ORAL at 09:24

## 2020-02-27 RX ADMIN — CARVEDILOL 25 MG: 25 TABLET, FILM COATED ORAL at 09:25

## 2020-02-27 RX ADMIN — ESCITALOPRAM OXALATE 20 MG: 10 TABLET ORAL at 09:24

## 2020-02-27 RX ADMIN — LOSARTAN POTASSIUM 50 MG: 50 TABLET ORAL at 09:25

## 2020-02-27 RX ADMIN — INSULIN LISPRO 2 UNITS: 100 INJECTION, SOLUTION INTRAVENOUS; SUBCUTANEOUS at 09:37

## 2020-02-27 ASSESSMENT — PAIN SCALES - GENERAL
PAINLEVEL_OUTOF10: 0

## 2020-02-27 NOTE — DISCHARGE SUMMARY
Hospital Medicine  DISCHARGE SUMMARY  Date: 2/27/2020  Name: Miracle Gardner  MRN: [id number]  YOB: 1940     Patient's PCP: Vicki Schirmer, MD  Admit Date: 2/21/2020   Discharge Date: 2/27/2020  Admitting Physician: Babita Glover MD  Discharge Physician: Honey Paul MD      Discharge Diagnoses:  Chest pain possibly due to GERD  Neutropenia, Pancytopenia   HTN  Insomnia  Anxiety/ Major depression  DMII without complications  Aortic Stenosis, severe  Acute mild L2 superior endplate compression fracture      HPI:    Chief Complaint   Patient presents with    Shortness of Breath    Irregular Heart Beat     Miracle Gardner is a 78 y.o.  female  who presents with shortness of breath, irregular heart rhythm, and chest discomfort. Patient states she has been loosing unintentionally and reports night sweat. She states she is not very active like she used to be and has been falling. Today her Home Health Nurse visited and was told her HR was 30. They called the PCP who advised to come to ED. On arrival to ED, her HR was normal. Will admit for further evaluation. Denies cough, leg swelling, hx of DVT/PE, or dizziness. Hx of HTN, HLD, Insomnia, DMII, Depression, and Anxiety. Hospital Course  Patient came in with chest pain with epigastric pain. CT of the abdomen showed no acute process, except an acute L2 superior endplate compression fracture. It also showed liver cirrhosis. Abdominal ultrasound showed cholelithiasis and gallbladder sludge, but no evidence of acute cholecystitis. Patient's troponins were negative. Stress test was negative. Echo showed an EF of 55 to 60% with severe aortic stenosis. Patient had a cardiac cath which showed no CAD. Patient's pain did improve during the course of her stay. Patient's chest pain may have been due to GERD. Patient was on a PPI. Patient's L2 compression fracture may have been due to a fall which she sustained last week.   Patient received PT and OT.    Patient had severe aortic stenosis. It was seen on echo. Cardiothoracic surgery was consulted. Patient will need follow-up for possible TAVR work-up in 1 to 2 weeks with cardiology. Patient had neutropenia and pancytopenia. It was likely multifactorial due to cirrhosis and possibly from psych meds. Patient had no gross bleeding. She did have low iron and was started on oral ferrous sulfate. Peripheral smear showed no hemolysis. Hematology was following. Lovenox was stopped. Remeron was stopped. Psychiatry did adjust some of patient's medications. Neutrophils did appear to improve prior to discharge. Patient will need outpatient follow-up with hematology in order to have a bone marrow biopsy performed. Patient had anxiety and major depression. Remeron was stopped due to neutropenia. Psychiatry stopped Xanax and placed patient on Ativan. Psychiatry increased BuSpar and decreased trazodone. Patient was stable. She was discharged home with home health. Physical Exam:  BP (!) 140/63   Pulse 59   Temp 98 °F (36.7 °C) (Oral)   Resp 14   Ht 5' 4\" (1.626 m)   Wt 140 lb 9.6 oz (63.8 kg)   SpO2 94%   BMI 24.13 kg/m²   Gen - no acute distress. relaxed  HEENT - NCAT  CV - RRR systolic murmur 2/6  Lungs - CTA bilaterally no W/C/R  Abd - soft, NT, ND  Ext - no cyanosis or edema  MS - no tenderness on left side of ribs in axillary line, no bruising    Consultations  IP CONSULT TO HOSPITALIST  IP CONSULT TO PRIMARY CARE PROVIDER  IP CONSULT TO CARDIOLOGY  IP CONSULT TO HEM/ONC  IP CONSULT TO PSYCHIATRY  IP CONSULT TO CASE MANAGEMENT    Invasive procedures:    2/26/2020: Left heart cath     Significant Diagnostic Studies:    Imaging  Ct Head Wo Contrast  Result Date: 2/22/2020  No acute intracranial abnormality. Ct Abdomen Pelvis W Iv Contrast Additional Contrast? None  Result Date: 2/21/2020  1. Acute mild L2 superior endplate compression fracture. No retropulsion.  2. No acute abnormality in the abdomen or pelvis. 3. Cirrhosis with a small volume of ascites and evidence of portal hypertension including mild splenomegaly and paraesophageal varices. No suspicious focal hepatic abnormality. 4. Cholelithiasis. Xr Chest Portable  Result Date: 2/21/2020  No evidence of acute process     Us Abdomen Limited  Result Date: 2/24/2020  1. Cholelithiasis and gallbladder sludge. No additional sonographic evidence of acute cholecystitis. Normal common bile duct. 2. Liver cirrhosis. No focal masses. Ct Lumbar Reconstruction Wo Post Process  Result Date: 2/21/2020    1. Acute mild L2 superior endplate compression fracture. No retropulsion. 2. No acute abnormality in the abdomen or pelvis. 3. Cirrhosis with a small volume of ascites and evidence of portal hypertension including mild splenomegaly and paraesophageal varices. No suspicious focal hepatic abnormality. 4. Cholelithiasis. Nm Myocardial Spect Rest Exercise Or Rx  Result Date: 2/24/2020  Conclusions   Summary  Normal perfusion study with normal distribution in all coronal, short, and  horizontal axis. The observed defect is consistent with diaphragmatic attenuation. Normal LV function. LVEF 65 %. Recommendation  Medical management-no intervention. Code Status:  Full Code      Discharge Medications:     Medication List      START taking these medications    ferrous gluconate 324 (37.5 Fe) MG Tabs  Take 1 tablet by mouth 2 times daily     LORazepam 0.5 MG tablet  Commonly known as:  ATIVAN  Take 1 tablet by mouth every 6 hours as needed for Anxiety for up to 5 days.         CHANGE how you take these medications    busPIRone 15 MG tablet  Commonly known as:  BUSPAR  Take 15 mg by mouth 3 times daily  What changed:    · medication strength  · when to take this     traZODone 50 MG tablet  Commonly known as:  DESYREL  Take 1 tablet by mouth nightly  What changed:  how much to take        CONTINUE taking these medications aspirin 81 MG EC tablet     carvedilol 25 MG tablet  Commonly known as:  Coreg  Take 1 tablet by mouth 2 times daily (with meals)     escitalopram 20 MG tablet  Commonly known as:  LEXAPRO     losartan 50 MG tablet  Commonly known as:  Cozaar  Take 1 tablet by mouth daily     omeprazole 20 MG delayed release capsule  Commonly known as:  PRILOSEC     simvastatin 40 MG tablet  Commonly known as:  ZOCOR     SITagliptin-metFORMIN  MG Tb24 per extended release tablet  Commonly known as:  JANUMET XR        STOP taking these medications    ALPRAZolam 0.5 MG tablet  Commonly known as:  XANAX     dapagliflozin 10 MG tablet  Commonly known as:  FARXIGA     mirtazapine 15 MG tablet  Commonly known as:  REMERON           Where to Get Your Medications      These medications were sent to [facility]/pharmacy #[id number], [address] [place] [address]    Phone:  [phone]   · busPIRone 15 MG tablet  · ferrous gluconate 324 (37.5 Fe) MG Tabs  · traZODone 50 MG tablet     You can get these medications from any pharmacy    Bring a paper prescription for each of these medications  · LORazepam 0.5 MG tablet         Patient Instructions:  Óscar Norwood MD  Schedule an appointment as soon as possible for a visit  for follow up of your hospital stay, and to check on your pain  [address] [place] [address]   Dagoberto Ramon MD  Go on 3/4/2020  3:20 P.M.  [address] [address] [phone]         The patient was seen and examined on day of discharge and this discharge summary is in conjunction with any daily progress note from day of discharge. Time spent on discharge is 32 minutes in the examination, evaluation, counseling and review of medications and discharge plan.     Luis Estrada MD

## 2020-02-27 NOTE — CONSULTS
Initial Psychiatric History and Physical    Lazaro Suarez  [id number]  2/21/2020 02/26/20    ID: Patient is a 78 yrs y.o. female    CC: \"I was having left arm pain, left side pain and left abdominal pain. \"    HPI: Lazaro Suarez is a 78 y.o.  female who presented on 2/21/2020 with Shortness of Breath and Irregular Heart Beat. Current medical problems include chest pain with epigastric pain, neutropenia, pancytopenia (likely secondary to cirrhosis) HTN, DM2, severe aortic stenosis, Insomnia, anxiety, depression. She is being followed by Hem/Onc who noted the following:    Pancytopenia: At least since October 2019. Peripheral smear with no hemolysis, blasts.  Could be secondary to underlying cirrhosis, antipsychotics.  Note low iron, r/o blood loss. Note UA neg. FOB ordered. Recommend oral iron supplementation. No hemolysis per labs or peripheral smear. Rule out connective tissue disorder, rheumatoid arthritis, monoclonal gammopathy.  No thyroid dysfunction. May have underlying MDS.  Not sure whether antipsychotics can be adjusted, recommend psychiatry evaluation. Consider further evaluation including bone marrow biopsy, aspiration    CT scan abdomen and pelvis 2/21/2020    Impression   1. Acute mild L2 superior endplate compression fracture.  No retropulsion. 2. No acute abnormality in the abdomen or pelvis. 3. Cirrhosis with a small volume of ascites and evidence of portal   hypertension including mild splenomegaly and paraesophageal varices.  No   suspicious focal hepatic abnormality. 4. Cholelithiasis. During this evening's interview she was alert & oriented x 3. She denied SI/HI and AVH. She endorses depression which she rates as \"5-6\" and anxiety as \"6-7\" on a scale of 0 to 10 with 0 being none and 10 being horrible. She notes that she has a hard time going to sleep, but once asleep she stays asleep. Notes that her appetite is good.     Past Psychiatric History: depression and anxiety, no Result Value Ref Range    POC Glucose 113 (H) 70 - 99 MG/DL   CBC Auto Differential    Collection Time: 02/26/20 10:10 AM   Result Value Ref Range    WBC (LL) 4.0 - 10.5 K/CU MM     1.4  WBC CALLED TO Shahrzadalonzo Mon RN @ [id number] BY DIONNE BERNARDT  RESULTS READ BACK      RBC 3.50 (L) 4.2 - 5.4 M/CU MM    Hemoglobin 10.2 (L) 12.5 - 16.0 GM/DL    Hematocrit 33.4 (L) 37 - 47 %    MCV 95.4 78 - 100 FL    MCH 29.1 27 - 31 PG    MCHC 30.5 (L) 32.0 - 36.0 %    RDW 14.8 11.7 - 14.9 %    Platelets 86 (L) 991 - 440 K/CU MM    MPV 10.6 7.5 - 11.1 FL    Differential Type AUTOMATED DIFFERENTIAL     Segs Relative 49.3 36 - 66 %    Lymphocytes % 32.9 24 - 44 %    Monocytes % 12.1 (H) 0 - 4 %    Eosinophils % 4.3 (H) 0 - 3 %    Basophils % 0.7 0 - 1 %    Segs Absolute 0.7 K/CU MM    Lymphocytes Absolute 0.5 K/CU MM    Monocytes Absolute 0.2 K/CU MM    Eosinophils Absolute 0.1 K/CU MM    Basophils Absolute 0.0 K/CU MM    Nucleated RBC % 0.0 %    Total Nucleated RBC 0.0 K/CU MM    Total Immature Neutrophil 0.01 K/CU MM    Immature Neutrophil % 0.7 (H) 0 - 0.43 %   Hemoglobin A1c    Collection Time: 02/26/20 12:30 PM   Result Value Ref Range    Hemoglobin A1C 6.0 4.2 - 6.3 %    eAG 126 mg/dL   Blood gas, arterial    Collection Time: 02/26/20 12:45 PM   Result Value Ref Range    pH, Bld 7.47 (H) 7.34 - 7.45    pCO2, Arterial 38.0 32 - 45 MMHG    pO2, Arterial 71 (L) 75 - 100 MMHG    Base Exc, Mixed 3.9  PLUS   (H) 0 - 2.3    HCO3, Arterial 27.7 (H) 18 - 23 MMOL/L    CO2 Content 28.9 (H) 19 - 24 MMOL/L    O2 Sat 94.3 (L) 96 - 97 %    Carbon Monoxide, Blood 0.2 0 - 5 %    Methemoglobin, Arterial 0.2 <1.5 %    Comment RA    POCT Glucose    Collection Time: 02/26/20  2:40 PM   Result Value Ref Range    POC Glucose 94 70 - 99 MG/DL   POCT Glucose    Collection Time: 02/26/20  4:18 PM   Result Value Ref Range    POC Glucose 193 (H) 70 - 99 MG/DL   POCT Glucose    Collection Time: 02/26/20  7:48 PM   Result Value Ref Range    POC Glucose 111 (H) 70 - 99 MG/DL       Review of Systems:  Reports of no current cardiovascular, respiratory, gastrointestinal, genitourinary, integumentary, neurological, muscuoskeletal, or immunological symptoms today. PSYCHIATRIC: See HPI above.     PSYCHIATRIC EXAMINATION / MENTAL STATUS EXAM    CONSTITUTIONAL:    Vitals:   Vitals:    02/26/20 1951   BP: (!) 118/54   Pulse: 70   Resp: 16   Temp: 98.1 °F (36.7 °C)   SpO2:       General appearance: [x] appears age, []  appears older than stated age,               [x]  adequately dressed and groomed, [] disheveled,               [x]  in no acute distress, [] appears mildly distressed, [] other           MUSCULOSKELETAL:   Gait:   [] normal, [] antalgic, [] unsteady, [x] gait not evaluated   Station:             [] erect, [] sitting, [x] recumbent, [] other        Strength/tone:  [x] muscle strength and tone appear consistent with age and                                           Condition [] atrophy [] abnormal movements  PSYCHIATRIC:    Relatedness:  [x] cooperative, [] guarded, [] indifferent, [] hostile,      [] sedated  Speech:  [x] normal prosody, [] pressured, [] decreased volume,    [] increased volume [] slurred [] slowed, [] delayed     [] echolalia, [] incoherent, [] stuttering   Eye contact:  [] direct, [x] fleeting , [] intense []  none  Kinetics:  [x] normal, [] increased, [] decreased  Mood:   [] stable, [x] depressed, [x] anxious, [] irritable,     [] labile  [] euphoric   Affect:   [] normal range, [] constricted, [x] depressed , [x] anxious,  [] angry, [x]  blunted     [] mood incongruent, [] blunted  [] restricted   Hallucinations:  [x] denies, [] auditory,  [] visual,  [] olfactory, [] tactile  Delusions:  [x] none, [] grandiose,  [] paranoid,  [] persecutory,  [] somatic,     [] bizarre  [] Judaism/spiritual    Preoccupations:   [x] none, [] violence, [] obsessions, [] other     Suicidal ideation  [x] denies, [] endorses  Homicidal ideation [x] denies, [] endorses  Thought process: [x] logical , [] circumstantial, [] tangential, [] BROOK,     [] simplistic, [] disorganized  [] FOI  [] concrete  [] nonsensical    Thought Content: [] future oriented [x] goal directed  [] self-harm, [] guilt,     [] hopelessness  [] obsessive  [] superficial  [] preoccupation    Insight:   [] adequate , [x] limited , [] impaired    Judgment:  [] adequate , [x] limited  [] impaired  Associations:              [x]  Logical and coherent , [] loosening, [] disorganized   Attention and concentration:     [x] intact [] limited [] impaired , [] grossly impaired  Orientation:  [x] person, place, time, situation     [] disoriented to:     Memory:             [x] superficially intact, [] impaired         Impression:   Moderate episode of recurrent major depressive disorder  Generalized anxiety disorder  Psychophysiological insomnia    Problem List:   Chest pain    Plan:  1. Agree with escitalopram  2. Discontinue alprazolam and switch to lorazepam since it only requires glucuronidation which should be more helpful given her underlying cirrhosis  3. Increase buspirone from BID to TID  4. Decrease trazodone from 100 to 50 mg to decrease risk of hepatotoxicity  5. Will follow loosely    Thank you for the interesting consult.     Electronically signed by WES Mcdonald CNP on 2/26/2020 at 8:10 PM

## 2020-02-27 NOTE — PROGRESS NOTES
Occupational Therapy  [facility] OCCUPATIONAL THERAPY EVALUATION    History  [place]:  The primary encounter diagnosis was Chest pain, unspecified type. Diagnoses of Abdominal pain, epigastric, Abnormal heart rate, Dizziness, Compression fracture of L2 vertebra, initial encounter ([address] Leukopenia, unspecified type, and Generalized anxiety disorder were also pertinent to this visit. Restrictions:                           Communication with other providers: PT, RN    Subjective:  Patient states:  \"I look horrible! \"  Pain:  none  Patient goal:  home    Occupational profile (relevant social history and personal factors):    Social/Functional History  Lives With: Alone(son stops by daily)  Type of Home: House  Home Layout: One level  Home Equipment: 4 wheeled walker  Receives Help From: Home health  ADL Assistance: Independent(has assist for shower x1 per week)  Homemaking Assistance: Independent  Homemaking Responsibilities: Yes  Ambulation Assistance: Independent  Transfer Assistance: Independent    Examination of body systems (includes body structures/functions, activity/participation limitations):  · Orientation: WFL  · Cognition:  WFL  · Observation:  Received pt in bed. Alert and cooperative . frail land fatigued in appearance. · Vision:  [employer]   · Hearing:  WFL   · ROM:  WFL BUE  · Strength: WFL BUE for basic activity  · Sensation: not tested    ADLs  Feeding: Yuri    Grooming: Yuri sinkside ~8 mins for oral, facial, hair care    Dressing: UB predict SBA in seated LB predict SBA    Bathing: UB predict SBA in seated LB predict SBA    Toileting: predict Yuri    *Some ADL determined per observation of actual ADL performance, functional mobility, balance, activity tolerance, and cognition.      AM-PAC 6 click short form for inpatient daily activity:  Raw Score: 21  24/24 = unimpaired  23/24 = 1-20% impaired   20/24-22/24 = 21-40% impaired  15/24-19/24 = 41-59% impaired   10/24-14/24 = 60%-79% impaired  7/24-9/24 = 80%-99% impaired  6/24 = 100% impaired    Functional Mobility  Bed mobility:   Supine to sit: Yuri    Sitting balance: good    Transfers:   Sit to stand: supervision  Stand to sit: SBA/safety cues for walker management when approaching seat    Standing balance: supervision/Yuri    Ambulation:  SBA c RW household distances with safety cues for safe walker management    Activity tolerance  WFL, but fatigued    Assessment:  Assessment  Performance deficits / Impairments: Decreased high-level IADLs  Treatment Diagnosis: chest pain  Prognosis: Good  Decision Making: Medium Complexity  REQUIRES OT FOLLOW UP: No  Discharge Recommendations: Home with assist PRN, Home with Home health OT, Home with nursing aide, S Level 1      Goals:  By d/c or goals met:         Plan:  Plan  Times per week: n/a      Recommendation for activity with nursing staff:  ambulate to bathroom with RW for toileting  ambulate in halls with RW    Treatment today:    Self Care Training:   Self care training was performed today. Cues were given for safety, sequence, UE/LE placement, visual cues, and balance. Activities performed today included sinkside hygiene ~8mins in standing  Education: Role of OT, OT POC, d/c needs, home safety    Safety: Left in chair with all needs in reach. Gait belt used for transfer and mobility. Time in:  0850  Time out:  0922  Timed treatment minutes:  17  Total treatment time:  32    Electronically signed by:    Bette Blanca [place], [address] [place] [address]   [id number]   11:18 AM, 2/27/2020    '

## 2020-02-27 NOTE — CONSULTS
sit: from flat bed, SBA    Trnasfers: STS from bed/chair with RW, SBA, good LE power. Gait: ambulated x12ft x2 to and from BR with RW, SBA with good balance (unable to perform further gait trial as RN arrived to pass meds). No overt balance or mobility deficits noted. Standing balance: at sink x5 min for oral care SBA for balance. Safety: patient left in chair with chair alarm, call light within reach, RN notified, gait belt used. Assessment:  Patient is a 77 yo female who presents with SOB and irregular HR. Demonstrates safe mobility with SBA and RW. Rec home with [place] [address]. Complexity: Moderate  Prognosis: Good, no significant barriers to participation at this time. Plan Times per week: 2/week, 1 week,   Discharge Recommendations: Home with Home health PT  Equipment: none    Goals:  Short term goals  Time Frame for Short term goals: 1 week  Short term goal 1: Patient will ambulate 100ft mod I with RW. Treatment plan:  Bed mobility, transfers, balance, gait, TA, TX.     Recommendations for NURSING mobility: ambulate to BR with RW    Time:   Time in: 0857  Time out: 0920  Timed treatment minutes: 8  Total time: 23    Electronically signed by:    Gabby Keith, PT  2/27/2020, 11:35 AM

## 2020-02-27 NOTE — PROGRESS NOTES
My name is Cheyanne Son,  I am a student nurse from [employer], I will be caring for Kobi today.

## 2020-02-27 NOTE — PLAN OF CARE
Problem: Falls - Risk of:  Goal: Will remain free from falls  Description  Will remain free from falls  2/27/2020 0859 by Mckinley Hassan  Outcome: Ongoing  2/26/2020 2237 by Roby Soulier, RN  Outcome: Ongoing  Goal: Absence of physical injury  Description  Absence of physical injury  2/27/2020 0859 by Skye Hassan  Outcome: Ongoing  2/26/2020 2237 by Roby Soulier, RN  Outcome: Ongoing     Problem: Infection:  Goal: Will remain free from infection  Description  Will remain free from infection  2/27/2020 0859 by Mckinley Hassan  Outcome: Ongoing  2/26/2020 2237 by Roby Soulier, RN  Outcome: Ongoing     Problem: Safety:  Goal: Free from accidental physical injury  Description  Free from accidental physical injury  2/27/2020 0859 by Mckinley Hassan  Outcome: Ongoing  2/26/2020 2237 by Roby Soulier, RN  Outcome: Ongoing  Goal: Free from intentional harm  Description  Free from intentional harm  2/27/2020 0859 by Patricia Muniz  Outcome: Ongoing  2/26/2020 2237 by Roby Soulier, RN  Outcome: Ongoing     Problem: Daily Care:  Goal: Daily care needs are met  Description  Daily care needs are met  2/27/2020 0859 by Patricia Muniz  Outcome: Ongoing  2/26/2020 2237 by Roby Soulier, RN  Outcome: Ongoing     Problem: Pain:  Goal: Patient's pain/discomfort is manageable  Description  Patient's pain/discomfort is manageable  2/27/2020 0859 by Patricia Muniz  Outcome: Ongoing  2/26/2020 2237 by Roby Soulier, RN  Outcome: Ongoing  Goal: Pain level will decrease  Description  Pain level will decrease  2/27/2020 0859 by Patricia Muniz  Outcome: Ongoing  2/26/2020 2237 by Roby Soulier, RN  Outcome: Ongoing  Goal: Control of acute pain  Description  Control of acute pain  2/27/2020 0859 by Skye Hassan  Outcome: Ongoing  2/26/2020 2237 by Roby Soulier, RN  Outcome: Ongoing  Goal: Control of chronic pain  Description  Control of chronic pain  2/27/2020 0859 by Skye Garay Sonya  Outcome: Ongoing  2/26/2020 2237 by Larissa Murry RN  Outcome: Ongoing     Problem: Skin Integrity:  Goal: Skin integrity will stabilize  Description  Skin integrity will stabilize  2/27/2020 0859 by Elisha Junior  Outcome: Ongoing  2/26/2020 2237 by Larissa Murry RN  Outcome: Ongoing     Problem: Discharge Planning:  Goal: Patients continuum of care needs are met  Description  Patients continuum of care needs are met  2/27/2020 0859 by Elisha Junior  Outcome: Ongoing  2/26/2020 2237 by Larissa Murry RN  Outcome: Ongoing     Problem: Physical Regulation:  Goal: Will remain free from infection  Description  Will remain free from infection  2/27/2020 0859 by Elisha Junior  Outcome: Ongoing  2/26/2020 2237 by Larissa Murry RN  Outcome: Ongoing

## 2020-05-23 ENCOUNTER — APPOINTMENT (OUTPATIENT)
Dept: GENERAL RADIOLOGY | Age: 80
DRG: 392 | End: 2020-05-23
Payer: MEDICARE

## 2020-05-23 ENCOUNTER — HOSPITAL ENCOUNTER (INPATIENT)
Age: 80
LOS: 4 days | Discharge: HOME | DRG: 392 | End: 2020-05-27
Attending: EMERGENCY MEDICINE | Admitting: INTERNAL MEDICINE
Payer: MEDICARE

## 2020-05-23 PROBLEM — I35.0 SEVERE AORTIC STENOSIS: Status: ACTIVE | Noted: 2020-05-23

## 2020-05-23 LAB
ALBUMIN SERPL-MCNC: 3.8 GM/DL (ref 3.4–5)
ALP BLD-CCNC: 150 IU/L (ref 40–129)
ALT SERPL-CCNC: 20 U/L (ref 10–40)
ANION GAP SERPL CALCULATED.3IONS-SCNC: 13 MMOL/L (ref 4–16)
AST SERPL-CCNC: 34 IU/L (ref 15–37)
BACTERIA: ABNORMAL /HPF
BASOPHILS ABSOLUTE: 0 K/CU MM
BASOPHILS RELATIVE PERCENT: 0.7 % (ref 0–1)
BILIRUB SERPL-MCNC: 1.2 MG/DL (ref 0–1)
BILIRUBIN URINE: NEGATIVE MG/DL
BLOOD, URINE: NEGATIVE
BUN BLDV-MCNC: 7 MG/DL (ref 6–23)
CALCIUM SERPL-MCNC: 9.1 MG/DL (ref 8.3–10.6)
CAST TYPE: ABNORMAL /HPF
CHLORIDE BLD-SCNC: 103 MMOL/L (ref 99–110)
CLARITY: ABNORMAL
CO2: 24 MMOL/L (ref 21–32)
COLOR: ABNORMAL
COMMENT UA: ABNORMAL
CREAT SERPL-MCNC: 0.8 MG/DL (ref 0.6–1.1)
CRYSTAL TYPE: NEGATIVE /HPF
DIFFERENTIAL TYPE: ABNORMAL
EOSINOPHILS ABSOLUTE: 0 K/CU MM
EOSINOPHILS RELATIVE PERCENT: 1 % (ref 0–3)
EPITHELIAL CELLS, UA: 3 /HPF
GFR AFRICAN AMERICAN: >60 ML/MIN/1.73M2
GFR NON-AFRICAN AMERICAN: >60 ML/MIN/1.73M2
GLUCOSE BLD-MCNC: 105 MG/DL (ref 70–99)
GLUCOSE BLD-MCNC: 123 MG/DL (ref 70–99)
GLUCOSE, URINE: NEGATIVE MG/DL
HCT VFR BLD CALC: 39 % (ref 37–47)
HEMOGLOBIN: 13 GM/DL (ref 12.5–16)
IMMATURE NEUTROPHIL %: 0.3 % (ref 0–0.43)
KETONES, URINE: NEGATIVE MG/DL
LEUKOCYTE ESTERASE, URINE: ABNORMAL
LYMPHOCYTES ABSOLUTE: 0.7 K/CU MM
LYMPHOCYTES RELATIVE PERCENT: 22.5 % (ref 24–44)
MCH RBC QN AUTO: 30.5 PG (ref 27–31)
MCHC RBC AUTO-ENTMCNC: 33.3 % (ref 32–36)
MCV RBC AUTO: 91.5 FL (ref 78–100)
MONOCYTES ABSOLUTE: 0.4 K/CU MM
MONOCYTES RELATIVE PERCENT: 11.4 % (ref 0–4)
NITRITE URINE, QUANTITATIVE: NEGATIVE
PDW BLD-RTO: 14.3 % (ref 11.7–14.9)
PH, URINE: 8 (ref 5–8)
PLATELET # BLD: 132 K/CU MM (ref 140–440)
PMV BLD AUTO: 9.9 FL (ref 7.5–11.1)
POTASSIUM SERPL-SCNC: 3.7 MMOL/L (ref 3.5–5.1)
PRO-BNP: 1006 PG/ML
PROTEIN UA: NEGATIVE MG/DL
RBC # BLD: 4.26 M/CU MM (ref 4.2–5.4)
RBC URINE: 2 /HPF (ref 0–6)
SEGMENTED NEUTROPHILS ABSOLUTE COUNT: 2 K/CU MM
SEGMENTED NEUTROPHILS RELATIVE PERCENT: 64.1 % (ref 36–66)
SODIUM BLD-SCNC: 140 MMOL/L (ref 135–145)
SPECIFIC GRAVITY UA: 1 (ref 1–1.03)
TOTAL IMMATURE NEUTOROPHIL: 0.01 K/CU MM
TOTAL PROTEIN: 6.7 GM/DL (ref 6.4–8.2)
TROPONIN T: <0.01 NG/ML
UROBILINOGEN, URINE: 0.2 MG/DL (ref 0.2–1)
WBC # BLD: 3.1 K/CU MM (ref 4–10.5)
WBC UA: 15 /HPF (ref 0–5)

## 2020-05-23 PROCEDURE — 85025 COMPLETE CBC W/AUTO DIFF WBC: CPT

## 2020-05-23 PROCEDURE — 6370000000 HC RX 637 (ALT 250 FOR IP): Performed by: INTERNAL MEDICINE

## 2020-05-23 PROCEDURE — 93005 ELECTROCARDIOGRAM TRACING: CPT | Performed by: EMERGENCY MEDICINE

## 2020-05-23 PROCEDURE — 6360000002 HC RX W HCPCS: Performed by: EMERGENCY MEDICINE

## 2020-05-23 PROCEDURE — 84484 ASSAY OF TROPONIN QUANT: CPT

## 2020-05-23 PROCEDURE — 96365 THER/PROPH/DIAG IV INF INIT: CPT

## 2020-05-23 PROCEDURE — 80053 COMPREHEN METABOLIC PANEL: CPT

## 2020-05-23 PROCEDURE — 87086 URINE CULTURE/COLONY COUNT: CPT

## 2020-05-23 PROCEDURE — 99285 EMERGENCY DEPT VISIT HI MDM: CPT

## 2020-05-23 PROCEDURE — 87077 CULTURE AEROBIC IDENTIFY: CPT

## 2020-05-23 PROCEDURE — 96375 TX/PRO/DX INJ NEW DRUG ADDON: CPT

## 2020-05-23 PROCEDURE — 6360000002 HC RX W HCPCS: Performed by: INTERNAL MEDICINE

## 2020-05-23 PROCEDURE — 1200000000 HC SEMI PRIVATE

## 2020-05-23 PROCEDURE — 82962 GLUCOSE BLOOD TEST: CPT

## 2020-05-23 PROCEDURE — 87186 SC STD MICRODIL/AGAR DIL: CPT

## 2020-05-23 PROCEDURE — 93010 ELECTROCARDIOGRAM REPORT: CPT | Performed by: INTERNAL MEDICINE

## 2020-05-23 PROCEDURE — 2580000003 HC RX 258: Performed by: INTERNAL MEDICINE

## 2020-05-23 PROCEDURE — 71045 X-RAY EXAM CHEST 1 VIEW: CPT

## 2020-05-23 PROCEDURE — 81001 URINALYSIS AUTO W/SCOPE: CPT

## 2020-05-23 PROCEDURE — 2580000003 HC RX 258: Performed by: EMERGENCY MEDICINE

## 2020-05-23 PROCEDURE — 83880 ASSAY OF NATRIURETIC PEPTIDE: CPT

## 2020-05-23 RX ORDER — DEXTROSE MONOHYDRATE 50 MG/ML
100 INJECTION, SOLUTION INTRAVENOUS PRN
Status: DISCONTINUED | OUTPATIENT
Start: 2020-05-23 | End: 2020-05-23

## 2020-05-23 RX ORDER — LORAZEPAM 2 MG/ML
0.5 INJECTION INTRAMUSCULAR ONCE
Status: COMPLETED | OUTPATIENT
Start: 2020-05-23 | End: 2020-05-23

## 2020-05-23 RX ORDER — DEXTROSE MONOHYDRATE 50 MG/ML
100 INJECTION, SOLUTION INTRAVENOUS PRN
Status: DISCONTINUED | OUTPATIENT
Start: 2020-05-23 | End: 2020-05-27 | Stop reason: HOSPADM

## 2020-05-23 RX ORDER — SODIUM CHLORIDE 0.9 % (FLUSH) 0.9 %
10 SYRINGE (ML) INJECTION EVERY 12 HOURS SCHEDULED
Status: DISCONTINUED | OUTPATIENT
Start: 2020-05-23 | End: 2020-05-27 | Stop reason: HOSPADM

## 2020-05-23 RX ORDER — PANTOPRAZOLE SODIUM 40 MG/1
40 TABLET, DELAYED RELEASE ORAL
Status: DISCONTINUED | OUTPATIENT
Start: 2020-05-24 | End: 2020-05-27 | Stop reason: HOSPADM

## 2020-05-23 RX ORDER — CARVEDILOL 25 MG/1
25 TABLET ORAL 2 TIMES DAILY WITH MEALS
Status: DISCONTINUED | OUTPATIENT
Start: 2020-05-23 | End: 2020-05-27 | Stop reason: HOSPADM

## 2020-05-23 RX ORDER — PROMETHAZINE HYDROCHLORIDE 25 MG/1
12.5 TABLET ORAL EVERY 6 HOURS PRN
Status: DISCONTINUED | OUTPATIENT
Start: 2020-05-23 | End: 2020-05-27 | Stop reason: HOSPADM

## 2020-05-23 RX ORDER — FERROUS GLUCONATE 324(37.5)
324 TABLET ORAL 2 TIMES DAILY
Status: DISCONTINUED | OUTPATIENT
Start: 2020-05-23 | End: 2020-05-23 | Stop reason: CLARIF

## 2020-05-23 RX ORDER — ACETAMINOPHEN 650 MG/1
650 SUPPOSITORY RECTAL EVERY 6 HOURS PRN
Status: DISCONTINUED | OUTPATIENT
Start: 2020-05-23 | End: 2020-05-27 | Stop reason: HOSPADM

## 2020-05-23 RX ORDER — ONDANSETRON 2 MG/ML
4 INJECTION INTRAMUSCULAR; INTRAVENOUS EVERY 6 HOURS PRN
Status: DISCONTINUED | OUTPATIENT
Start: 2020-05-23 | End: 2020-05-27 | Stop reason: HOSPADM

## 2020-05-23 RX ORDER — ATORVASTATIN CALCIUM 20 MG/1
20 TABLET, FILM COATED ORAL DAILY
Status: DISCONTINUED | OUTPATIENT
Start: 2020-05-23 | End: 2020-05-27 | Stop reason: HOSPADM

## 2020-05-23 RX ORDER — NITROGLYCERIN 0.4 MG/1
0.4 TABLET SUBLINGUAL EVERY 5 MIN PRN
Status: DISCONTINUED | OUTPATIENT
Start: 2020-05-23 | End: 2020-05-27 | Stop reason: HOSPADM

## 2020-05-23 RX ORDER — LOSARTAN POTASSIUM 25 MG/1
50 TABLET ORAL DAILY
Status: DISCONTINUED | OUTPATIENT
Start: 2020-05-23 | End: 2020-05-27 | Stop reason: HOSPADM

## 2020-05-23 RX ORDER — NICOTINE POLACRILEX 4 MG
15 LOZENGE BUCCAL PRN
Status: DISCONTINUED | OUTPATIENT
Start: 2020-05-23 | End: 2020-05-23

## 2020-05-23 RX ORDER — NICOTINE POLACRILEX 4 MG
15 LOZENGE BUCCAL PRN
Status: DISCONTINUED | OUTPATIENT
Start: 2020-05-23 | End: 2020-05-27 | Stop reason: HOSPADM

## 2020-05-23 RX ORDER — ESCITALOPRAM OXALATE 10 MG/1
20 TABLET ORAL DAILY
Status: DISCONTINUED | OUTPATIENT
Start: 2020-05-24 | End: 2020-05-27

## 2020-05-23 RX ORDER — ACETAMINOPHEN 325 MG/1
650 TABLET ORAL EVERY 6 HOURS PRN
Status: DISCONTINUED | OUTPATIENT
Start: 2020-05-23 | End: 2020-05-27 | Stop reason: HOSPADM

## 2020-05-23 RX ORDER — DEXTROSE MONOHYDRATE 25 G/50ML
12.5 INJECTION, SOLUTION INTRAVENOUS PRN
Status: DISCONTINUED | OUTPATIENT
Start: 2020-05-23 | End: 2020-05-23

## 2020-05-23 RX ORDER — FERROUS GLUCONATE 324(37.5)
324 TABLET ORAL 2 TIMES DAILY
Status: DISCONTINUED | OUTPATIENT
Start: 2020-05-23 | End: 2020-05-27 | Stop reason: HOSPADM

## 2020-05-23 RX ORDER — BUSPIRONE HYDROCHLORIDE 15 MG/1
15 TABLET ORAL 3 TIMES DAILY
Status: DISCONTINUED | OUTPATIENT
Start: 2020-05-23 | End: 2020-05-27 | Stop reason: HOSPADM

## 2020-05-23 RX ORDER — TRAZODONE HYDROCHLORIDE 50 MG/1
50 TABLET ORAL NIGHTLY
Status: DISCONTINUED | OUTPATIENT
Start: 2020-05-23 | End: 2020-05-27 | Stop reason: HOSPADM

## 2020-05-23 RX ORDER — DEXTROSE MONOHYDRATE 25 G/50ML
12.5 INJECTION, SOLUTION INTRAVENOUS PRN
Status: DISCONTINUED | OUTPATIENT
Start: 2020-05-23 | End: 2020-05-27 | Stop reason: HOSPADM

## 2020-05-23 RX ORDER — POLYETHYLENE GLYCOL 3350 17 G/17G
17 POWDER, FOR SOLUTION ORAL DAILY PRN
Status: DISCONTINUED | OUTPATIENT
Start: 2020-05-23 | End: 2020-05-27 | Stop reason: HOSPADM

## 2020-05-23 RX ORDER — SODIUM CHLORIDE 0.9 % (FLUSH) 0.9 %
10 SYRINGE (ML) INJECTION PRN
Status: DISCONTINUED | OUTPATIENT
Start: 2020-05-23 | End: 2020-05-27 | Stop reason: HOSPADM

## 2020-05-23 RX ORDER — ASPIRIN 81 MG/1
81 TABLET ORAL DAILY
Status: DISCONTINUED | OUTPATIENT
Start: 2020-05-24 | End: 2020-05-27 | Stop reason: HOSPADM

## 2020-05-23 RX ADMIN — ENOXAPARIN SODIUM 30 MG: 30 INJECTION SUBCUTANEOUS at 19:26

## 2020-05-23 RX ADMIN — TRAZODONE HYDROCHLORIDE 50 MG: 50 TABLET ORAL at 23:23

## 2020-05-23 RX ADMIN — SODIUM CHLORIDE, PRESERVATIVE FREE 10 ML: 5 INJECTION INTRAVENOUS at 23:23

## 2020-05-23 RX ADMIN — CEFTRIAXONE SODIUM 1 G: 1 INJECTION, POWDER, FOR SOLUTION INTRAMUSCULAR; INTRAVENOUS at 16:42

## 2020-05-23 RX ADMIN — FERROUS GLUCONATE TAB 324 MG (37.5 MG ELEMENTAL IRON) 324 MG: 324 (37.5 FE) TAB at 23:32

## 2020-05-23 RX ADMIN — CARVEDILOL 25 MG: 25 TABLET, FILM COATED ORAL at 19:26

## 2020-05-23 RX ADMIN — LORAZEPAM 0.5 MG: 2 INJECTION, SOLUTION INTRAMUSCULAR; INTRAVENOUS at 16:33

## 2020-05-23 RX ADMIN — BUSPIRONE HYDROCHLORIDE 15 MG: 15 TABLET ORAL at 23:23

## 2020-05-23 RX ADMIN — ATORVASTATIN CALCIUM 20 MG: 20 TABLET, FILM COATED ORAL at 23:23

## 2020-05-23 ASSESSMENT — ENCOUNTER SYMPTOMS
VOMITING: 0
STRIDOR: 0
NAUSEA: 0
COLOR CHANGE: 0
TROUBLE SWALLOWING: 1
WHEEZING: 0
ABDOMINAL PAIN: 0
SHORTNESS OF BREATH: 1
VOICE CHANGE: 0
FACIAL SWELLING: 0

## 2020-05-23 NOTE — ED PROVIDER NOTES
for abdominal pain, nausea and vomiting. Genitourinary: Negative for dysuria and vaginal bleeding. Musculoskeletal: Negative for neck pain and neck stiffness. Skin: Negative for color change and wound. Neurological: Negative for seizures and syncope. Psychiatric/Behavioral: Negative for self-injury and suicidal ideas. Except as noted above the remainder of the review of systems was reviewed and negative. PAST MEDICAL HISTORY     Past Medical History:   Diagnosis Date    Anxiety     Cellulitis     foot.  Cirrhosis ([facility] 02/27/2020    seen on CT scan    [facility]2 [facility] 02/29/2020    seen on CT    Constipation     Depression     Diabetes mellitus ([facility]     Hyperlipidemia     Hypertension     Insomnia     Rosacea     Sinus tachycardia          SURGICAL HISTORY       Past Surgical History:   Procedure Laterality Date    BACK SURGERY      HYSTERECTOMY      TOE SURGERY           CURRENT MEDICATIONS       Previous Medications    ASPIRIN 81 MG EC TABLET    Take 81 mg by mouth daily    BUSPIRONE (BUSPAR) 15 MG TABLET    Take 15 mg by mouth 3 times daily    CARVEDILOL (COREG) 25 MG TABLET    Take 1 tablet by mouth 2 times daily (with meals)    ESCITALOPRAM (LEXAPRO) 20 MG TABLET    Take 20 mg by mouth daily    FERROUS GLUCONATE 324 (37.5 FE) MG TABS    Take 1 tablet by mouth 2 times daily    LOSARTAN (COZAAR) 50 MG TABLET    Take 1 tablet by mouth daily    OMEPRAZOLE (PRILOSEC) 20 MG DELAYED RELEASE CAPSULE    Take 20 mg by mouth daily    SIMVASTATIN (ZOCOR) 40 MG TABLET    Take 40 mg by mouth nightly    SITAGLIPTIN-METFORMIN (JANUMET XR)  MG TB24 TABLET    Take 1 tablet by mouth 2 times daily    TRAZODONE (DESYREL) 50 MG TABLET    Take 1 tablet by mouth nightly       ALLERGIES     Chocolate and Nuts [peanut-containing drug products]    FAMILY HISTORY     No family history on file.        SOCIAL HISTORY       Social History Socioeconomic History    Marital status:      Spouse name: Not on file    Number of children: Not on file    Years of education: Not on file    Highest education level: Not on file   Occupational History    Not on file   Social Needs    Financial resource strain: Not on file    Food insecurity     Worry: Not on file     Inability: Not on file    Transportation needs     Medical: Not on file     Non-medical: Not on file   Tobacco Use    Smoking status: Never Smoker    Smokeless tobacco: Never Used   Substance and Sexual Activity    Alcohol use: No    Drug use: No    Sexual activity: Not on file   Lifestyle    Physical activity     Days per week: Not on file     Minutes per session: Not on file    Stress: Not on file   Relationships    Social connections     Talks on phone: Not on file     Gets together: Not on file     Attends Confucianism service: Not on file     Active member of club or organization: Not on file     Attends meetings of clubs or organizations: Not on file     Relationship status: Not on file    Intimate partner violence     Fear of current or ex partner: Not on file     Emotionally abused: Not on file     Physically abused: Not on file     Forced sexual activity: Not on file   Other Topics Concern    Not on file   Social History Narrative    Not on file         PHYSICAL EXAM    (up to 7 for level 4, 8 or more for level 5)     ED Triage Vitals [05/23/20 1357]   BP Temp Temp Source Pulse Resp SpO2 Height Weight   (!) 144/106 99.4 °F (37.4 °C) Oral 90 20 96 % 5' 4\" (1.626 m) 130 lb (59 kg)       Physical Exam  Vitals signs and nursing note reviewed. Constitutional:       Appearance: She is well-developed. Comments: Pleasant mildly anxious appearing elderly  female in no acute distress. HENT:      Head: Normocephalic and atraumatic.       Right Ear: External ear normal.      Left Ear: External ear normal.   Eyes:      Conjunctiva/sclera: Conjunctivae normal. Neck:      Musculoskeletal: Neck supple. Vascular: No JVD. Trachea: No tracheal deviation. Cardiovascular:      Rate and Rhythm: Normal rate. Heart sounds: Murmur (harsh holosystolic murmur) present. Pulmonary:      Effort: Pulmonary effort is normal. No respiratory distress. Breath sounds: Normal breath sounds. No wheezing. Abdominal:      General: There is no distension. Palpations: Abdomen is soft. Tenderness: There is no abdominal tenderness. There is no guarding or rebound. Musculoskeletal: Normal range of motion. General: No tenderness or deformity. Skin:     General: Skin is warm and dry. Neurological:      General: No focal deficit present. Mental Status: She is oriented to person, place, and time. Cranial Nerves: No cranial nerve deficit. DIAGNOSTIC RESULTS     EKG:All EKG's are interpreted by the Emergency Department Physician who either signs or Co-signs this chart in the absence of a cardiologist.    The Ekg interpreted by me shows  normal sinus rhythm with a rate of 75  Axis is   Normal  QTc is  462ms  Intervals and Durations are unremarkable. ST Segments: PVC with no severe acute change  OR interval has decreased and there are new PVCs but there are no significant acute changes from previous EKG dated 2/22/2020      RADIOLOGY:     Interpretation per the Radiologist below, if available at the time of this note:    XR CHEST PORTABLE   Final Result   1. No acute cardiopulmonary process identified.                LABS:  Labs Reviewed   CBC WITH AUTO DIFFERENTIAL - Abnormal; Notable for the following components:       Result Value    WBC 3.1 (*)     Platelets 396 (*)     Lymphocytes % 22.5 (*)     Monocytes % 11.4 (*)     All other components within normal limits   COMPREHENSIVE METABOLIC PANEL - Abnormal; Notable for the following components:    Glucose 123 (*)     Total Bilirubin 1.2 (*)     Alkaline Phosphatase 150 (*)     All aremis-transcribed. )    Mukul Harman MD (electronically signed)  Attending Emergency Physician       Mukul Harman MD  05/23/20 5211

## 2020-05-23 NOTE — ED NOTES
Report was given to the [employer] and to [employer] at [facility]. The patient was alert and oriented without pain when she left this facility.             Carl Lucas RN  05/23/20 6327

## 2020-05-24 LAB
AMMONIA: 68 UMOL/L (ref 11–51)
ANION GAP SERPL CALCULATED.3IONS-SCNC: 10 MMOL/L (ref 4–16)
BUN BLDV-MCNC: 8 MG/DL (ref 6–23)
CALCIUM SERPL-MCNC: 8.8 MG/DL (ref 8.3–10.6)
CHLORIDE BLD-SCNC: 104 MMOL/L (ref 99–110)
CO2: 26 MMOL/L (ref 21–32)
CREAT SERPL-MCNC: 0.8 MG/DL (ref 0.6–1.1)
GFR AFRICAN AMERICAN: >60 ML/MIN/1.73M2
GFR NON-AFRICAN AMERICAN: >60 ML/MIN/1.73M2
GLUCOSE BLD-MCNC: 107 MG/DL (ref 70–99)
GLUCOSE BLD-MCNC: 114 MG/DL (ref 70–99)
GLUCOSE BLD-MCNC: 96 MG/DL (ref 70–99)
GLUCOSE BLD-MCNC: 98 MG/DL (ref 70–99)
HAV IGM SER IA-ACNC: NON REACTIVE
HCT VFR BLD CALC: 41.8 % (ref 37–47)
HEMOGLOBIN: 12.7 GM/DL (ref 12.5–16)
HEPATITIS B CORE IGM ANTIBODY: NON REACTIVE
HEPATITIS B SURFACE ANTIGEN: NON REACTIVE
HEPATITIS C ANTIBODY: NON REACTIVE
MCH RBC QN AUTO: 30.8 PG (ref 27–31)
MCHC RBC AUTO-ENTMCNC: 30.4 % (ref 32–36)
MCV RBC AUTO: 101.2 FL (ref 78–100)
PDW BLD-RTO: 14.7 % (ref 11.7–14.9)
PLATELET # BLD: 104 K/CU MM (ref 140–440)
PMV BLD AUTO: 9.5 FL (ref 7.5–11.1)
POTASSIUM SERPL-SCNC: 3.9 MMOL/L (ref 3.5–5.1)
RBC # BLD: 4.13 M/CU MM (ref 4.2–5.4)
SODIUM BLD-SCNC: 140 MMOL/L (ref 135–145)
WBC # BLD: 2.5 K/CU MM (ref 4–10.5)

## 2020-05-24 PROCEDURE — 1200000000 HC SEMI PRIVATE

## 2020-05-24 PROCEDURE — 80048 BASIC METABOLIC PNL TOTAL CA: CPT

## 2020-05-24 PROCEDURE — 6360000002 HC RX W HCPCS: Performed by: INTERNAL MEDICINE

## 2020-05-24 PROCEDURE — 6370000000 HC RX 637 (ALT 250 FOR IP): Performed by: SPECIALIST

## 2020-05-24 PROCEDURE — 82962 GLUCOSE BLOOD TEST: CPT

## 2020-05-24 PROCEDURE — 94761 N-INVAS EAR/PLS OXIMETRY MLT: CPT

## 2020-05-24 PROCEDURE — 99223 1ST HOSP IP/OBS HIGH 75: CPT | Performed by: INTERNAL MEDICINE

## 2020-05-24 PROCEDURE — 86256 FLUORESCENT ANTIBODY TITER: CPT

## 2020-05-24 PROCEDURE — 6370000000 HC RX 637 (ALT 250 FOR IP): Performed by: HOSPITALIST

## 2020-05-24 PROCEDURE — 36415 COLL VENOUS BLD VENIPUNCTURE: CPT

## 2020-05-24 PROCEDURE — 2580000003 HC RX 258: Performed by: INTERNAL MEDICINE

## 2020-05-24 PROCEDURE — 6370000000 HC RX 637 (ALT 250 FOR IP): Performed by: INTERNAL MEDICINE

## 2020-05-24 PROCEDURE — 82140 ASSAY OF AMMONIA: CPT

## 2020-05-24 PROCEDURE — 80074 ACUTE HEPATITIS PANEL: CPT

## 2020-05-24 PROCEDURE — 85027 COMPLETE CBC AUTOMATED: CPT

## 2020-05-24 RX ORDER — ALPRAZOLAM 0.25 MG/1
0.25 TABLET ORAL 3 TIMES DAILY PRN
Status: DISCONTINUED | OUTPATIENT
Start: 2020-05-24 | End: 2020-05-27 | Stop reason: HOSPADM

## 2020-05-24 RX ORDER — LACTULOSE 10 G/15ML
10 SOLUTION ORAL 2 TIMES DAILY
Status: DISCONTINUED | OUTPATIENT
Start: 2020-05-24 | End: 2020-05-27 | Stop reason: HOSPADM

## 2020-05-24 RX ADMIN — TRAZODONE HYDROCHLORIDE 50 MG: 50 TABLET ORAL at 21:05

## 2020-05-24 RX ADMIN — ALPRAZOLAM 0.25 MG: 0.25 TABLET ORAL at 16:13

## 2020-05-24 RX ADMIN — ESCITALOPRAM OXALATE 20 MG: 10 TABLET ORAL at 10:50

## 2020-05-24 RX ADMIN — SODIUM CHLORIDE, PRESERVATIVE FREE 10 ML: 5 INJECTION INTRAVENOUS at 21:05

## 2020-05-24 RX ADMIN — FERROUS GLUCONATE TAB 324 MG (37.5 MG ELEMENTAL IRON) 324 MG: 324 (37.5 FE) TAB at 10:51

## 2020-05-24 RX ADMIN — LOSARTAN POTASSIUM 50 MG: 25 TABLET, FILM COATED ORAL at 10:50

## 2020-05-24 RX ADMIN — CARVEDILOL 25 MG: 25 TABLET, FILM COATED ORAL at 18:07

## 2020-05-24 RX ADMIN — PANTOPRAZOLE SODIUM 40 MG: 40 TABLET, DELAYED RELEASE ORAL at 05:44

## 2020-05-24 RX ADMIN — CARVEDILOL 25 MG: 25 TABLET, FILM COATED ORAL at 10:50

## 2020-05-24 RX ADMIN — LACTULOSE 10 G: 10 SOLUTION ORAL at 21:06

## 2020-05-24 RX ADMIN — ATORVASTATIN CALCIUM 20 MG: 20 TABLET, FILM COATED ORAL at 21:05

## 2020-05-24 RX ADMIN — BUSPIRONE HYDROCHLORIDE 15 MG: 15 TABLET ORAL at 10:50

## 2020-05-24 RX ADMIN — BUSPIRONE HYDROCHLORIDE 15 MG: 15 TABLET ORAL at 21:05

## 2020-05-24 RX ADMIN — ASPIRIN 81 MG: 81 TABLET, COATED ORAL at 10:50

## 2020-05-24 RX ADMIN — FERROUS GLUCONATE TAB 324 MG (37.5 MG ELEMENTAL IRON) 324 MG: 324 (37.5 FE) TAB at 21:05

## 2020-05-24 RX ADMIN — ALPRAZOLAM 0.25 MG: 0.25 TABLET ORAL at 21:05

## 2020-05-24 RX ADMIN — BUSPIRONE HYDROCHLORIDE 15 MG: 15 TABLET ORAL at 14:30

## 2020-05-24 RX ADMIN — LACTULOSE 10 G: 10 SOLUTION ORAL at 11:02

## 2020-05-24 RX ADMIN — SODIUM CHLORIDE, PRESERVATIVE FREE 10 ML: 5 INJECTION INTRAVENOUS at 10:51

## 2020-05-24 RX ADMIN — PROMETHAZINE HYDROCHLORIDE 12.5 MG: 25 TABLET ORAL at 21:05

## 2020-05-24 RX ADMIN — CEFTRIAXONE 1 G: 1 INJECTION, POWDER, FOR SOLUTION INTRAMUSCULAR; INTRAVENOUS at 16:13

## 2020-05-24 NOTE — CONSULTS
Throbbing headache    Nuts [Peanut-Containing Drug Products] Other (See Comments)     \"makes my nose run and like i have a bad cold\"       sodium chloride flush 0.9 % injection 10 mL, 2 times per day  sodium chloride flush 0.9 % injection 10 mL, PRN  acetaminophen (TYLENOL) tablet 650 mg, Q6H PRN    Or  acetaminophen (TYLENOL) suppository 650 mg, Q6H PRN  polyethylene glycol (GLYCOLAX) packet 17 g, Daily PRN  promethazine (PHENERGAN) tablet 12.5 mg, Q6H PRN    Or  ondansetron (ZOFRAN) injection 4 mg, Q6H PRN  enoxaparin (LOVENOX) injection 30 mg, Daily  nitroGLYCERIN (NITROSTAT) SL tablet 0.4 mg, Q5 Min PRN  aspirin EC tablet 81 mg, Daily  busPIRone (BUSPAR) tablet 15 mg, TID  carvedilol (COREG) tablet 25 mg, BID WC  escitalopram (LEXAPRO) tablet 20 mg, Daily  losartan (COZAAR) tablet 50 mg, Daily  pantoprazole (PROTONIX) tablet 40 mg, QAM AC  insulin lispro (HUMALOG) injection vial 0-12 Units, TID WC  insulin lispro (HUMALOG) injection vial 0-6 Units, Nightly  atorvastatin (LIPITOR) tablet 20 mg, Daily  traZODone (DESYREL) tablet 50 mg, Nightly  ferrous gluconate 324 (37.5 Fe) MG tablet 324 mg, BID  glucose (GLUTOSE) 40 % oral gel 15 g, PRN  dextrose 50 % IV solution, PRN  glucagon (rDNA) injection 1 mg, PRN  dextrose 5 % solution, PRN  cefTRIAXone (ROCEPHIN) 1 g IVPB in 50 mL D5W minibag, Q24H      Current Facility-Administered Medications   Medication Dose Route Frequency Provider Last Rate Last Dose    sodium chloride flush 0.9 % injection 10 mL  10 mL Intravenous 2 times per day Misael Luu MD   10 mL at 05/23/20 4683    sodium chloride flush 0.9 % injection 10 mL  10 mL Intravenous PRN Misael Luu MD        acetaminophen (TYLENOL) tablet 650 mg  650 mg Oral Q6H PRN Misael Luu MD        Or    acetaminophen (TYLENOL) suppository 650 mg  650 mg Rectal Q6H PRN Misael Luu MD        polyethylene glycol [facility]) packet 17 g  17 g Oral Daily PRN Misael Luu MD       [place] promethazine (PHENERGAN) tablet 12.5 mg  12.5 mg Oral Q6H PRN Matias Ren MD        Or    ondansetron [facility]) injection 4 mg  4 mg Intravenous Q6H PRN Matias Ren MD        enoxaparin (LOVENOX) injection 30 mg  30 mg Subcutaneous Daily Matias Ren MD   30 mg at 05/23/20 1926    nitroGLYCERIN (NITROSTAT) SL tablet 0.4 mg  0.4 mg Sublingual Q5 Min PRN Matias Ren MD        aspirin EC tablet 81 mg  81 mg Oral Daily Matias Ren MD        busPIRone (BUSPAR) tablet 15 mg  15 mg Oral TID Matias Ren MD   15 mg at 05/23/20 2323    carvedilol (COREG) tablet 25 mg  25 mg Oral BID  Matias Ren MD   25 mg at 05/23/20 1926    escitalopram (LEXAPRO) tablet 20 mg  20 mg Oral Daily Matias Ren MD        losartan (COZAAR) tablet 50 mg  50 mg Oral Daily Matias Ren MD        pantoprazole (PROTONIX) tablet 40 mg  40 mg Oral QAM AC Matias Ren MD   40 mg at 05/24/20 0544    insulin lispro (HUMALOG) injection vial 0-12 Units  0-12 Units Subcutaneous TID  Matias Ren MD        insulin lispro (HUMALOG) injection vial 0-6 Units  0-6 Units Subcutaneous Nightly Matias Ren MD        atorvastatin (LIPITOR) tablet 20 mg  20 mg Oral Daily Matias Ren MD   20 mg at 05/23/20 2323    traZODone (DESYREL) tablet 50 mg  50 mg Oral Nightly Matias Ren MD   50 mg at 05/23/20 2323    ferrous gluconate 324 (37.5 Fe) MG tablet 324 mg  324 mg Oral BID Matias Ren MD   324 mg at 05/23/20 2332    glucose (GLUTOSE) 40 % oral gel 15 g  15 g Oral PRN Norm Huma Mtz MD        dextrose 50 % IV solution  12.5 g Intravenous PRN Norm Mtz MD        glucagon (rDNA) injection 1 mg  1 mg Intramuscular PRN Norm Huma Mtz MD        dextrose 5 % solution  100 mL/hr Intravenous PRN Norm Mtz MD        cefTRIAXone (ROCEPHIN) 1 g IVPB in 50 mL D5W minibag  1 g Intravenous Q24H Norm Mtz MD         Review of Systems:   · Constitutional: No Anxiety, Cellulitis, Cirrhosis ([address] Closed compression fracture of L2 lumbar vertebra, initial encounter ([address] Constipation, Depression, Diabetes mellitus ([address] Hyperlipidemia, Hypertension, Insomnia, Rosacea, and Sinus tachycardia. Plan and Recommendations:    Severe aortic stenosis: She will need definitive treatment with aortic valve replacement by TAVR or surgery but I do not think it is the reason why she is in the hospital right now I do not think she has decompensated nevertheless she will definitely need intervention eventually. She needs to follow-up in valve clinic for further work-up which will include a CT chest.  May use small dose Lasix for symptomatic relief due to history of ascites and cirrhosis  CHF: I am not sure if she is decompensated right now  PVC burden during her previous hospitalization she was noted to have significant PVC burden and post PVC pause. HTN: stable, continue present medications   History of liver cirrhosis complains of dysphagia consider GI evaluation  DVT prophylaxis if no contraindication  6. Dyslipidemia: continue statins           Thank you  much for consult and giving us the opportunity in contributing in the care of this patient. Please feel free to call me for any questions.        Amrita Espinal MD, 5/24/2020 7:26 AM

## 2020-05-24 NOTE — CONSULTS
Department of Internal Medicine  Gastroenterology Consult Note  Mirna Deleon. Nelson HUMPHRIES      Reason for Consult:  [address]    Primary Care Physician:  Ruth Bell MD    History Obtained From:  patient    HISTORY OF PRESENT ILLNESS:              The patient is a 78 y.o.  female who does not appear to be a reliable historian- seems to have severe memory issues. She was admitted with dyspnea and dysphagia. . The dysphagia has been slowly progressive over the past year and seems that solids are worse than liquids. at the time of her last admission 3 months ago she was found to have cirrhosis but never sought GI follow up. She denies alcohol abuse or hepatitis. She denies abd pain ,nausea, vomiting, diarrhea, constipation, melena hematochezia. Past Medical History:        Diagnosis Date    Anxiety     Cellulitis     foot.  Cirrhosis ([facility] 02/27/2020    seen on CT scan    Closed compression fracture of L2 lumbar vertebra, initial encounter ([facility] 02/29/2020    seen on CT    Constipation     Depression     Diabetes mellitus ([facility]     Hyperlipidemia     Hypertension     Insomnia     Rosacea     Sinus tachycardia        Past Surgical History:        Procedure Laterality Date    BACK SURGERY      HYSTERECTOMY      TOE SURGERY         Medications Prior to Admission:    Prior to Admission medications    Medication Sig Start Date End Date Taking?  Authorizing Provider   busPIRone (BUSPAR) 15 MG tablet Take 15 mg by mouth 3 times daily 2/27/20   Benja Shelton MD   traZODone (DESYREL) 50 MG tablet Take 1 tablet by mouth nightly 2/27/20   Benja Shelton MD   ferrous gluconate 324 (37.5 Fe) MG TABS Take 1 tablet by mouth 2 times daily 2/27/20   Benja Shelton MD   omeprazole (PRILOSEC) 20 MG delayed release capsule Take 20 mg by mouth daily    Historical Provider, MD   simvastatin (ZOCOR) 40 MG tablet Take 40 mg by mouth nightly    Historical Provider, MD   losartan (COZAAR) 50 MG tablet Take 1 tablet by

## 2020-05-24 NOTE — PROGRESS NOTES
Anna Bryant is a 78 y.o. female patient does not remember having AS but states has intermittent episodes of difficulty eating with solid foods.  Does not recall ever having cirrhosis    Current Facility-Administered Medications   Medication Dose Route Frequency Provider Last Rate Last Dose    sodium chloride flush 0.9 % injection 10 mL  10 mL Intravenous 2 times per day Kade Fuller MD   10 mL at 05/23/20 2323    sodium chloride flush 0.9 % injection 10 mL  10 mL Intravenous PRN Kade Fuller MD        acetaminophen (TYLENOL) tablet 650 mg  650 mg Oral Q6H PRN Kade Fuller MD        Or    acetaminophen (TYLENOL) suppository 650 mg  650 mg Rectal Q6H PRN Kade Fuller MD        polyethylene glycol [facility]) packet 17 g  17 g Oral Daily PRN Kade Fuller MD        promethazine (PHENERGAN) tablet 12.5 mg  12.5 mg Oral Q6H PRN Kade Fuller MD        Or    ondansetron [facility]) injection 4 mg  4 mg Intravenous Q6H PRN Kade Fuller MD        enoxaparin (LOVENOX) injection 30 mg  30 mg Subcutaneous Daily Kade Fluler MD   30 mg at 05/23/20 1926    nitroGLYCERIN (NITROSTAT) SL tablet 0.4 mg  0.4 mg Sublingual Q5 Min PRN Kade Fuller MD        aspirin EC tablet 81 mg  81 mg Oral Daily Kade Fuller MD        busPIRone (BUSPAR) tablet 15 mg  15 mg Oral TID Kade Fuller MD   15 mg at 05/23/20 2323    carvedilol (COREG) tablet 25 mg  25 mg Oral BID  Kade Fuller MD   25 mg at 05/23/20 1926    escitalopram (LEXAPRO) tablet 20 mg  20 mg Oral Daily Kade Fuller MD        losartan (COZAAR) tablet 50 mg  50 mg Oral Daily Kade Fuller MD        pantoprazole (PROTONIX) tablet 40 mg  40 mg Oral QAM AC Kade Fuller MD   40 mg at 05/24/20 0544    insulin lispro (HUMALOG) injection vial 0-12 Units  0-12 Units Subcutaneous TID  Kade Fuller MD        insulin lispro (HUMALOG) injection vial 0-6 Units  0-6 Units Subcutaneous Nightly [facility] Beryle Rings, MD        atorvastatin (LIPITOR) tablet 20 mg  20 mg Oral Daily Misael Luu MD   20 mg at 05/23/20 2323    traZODone (DESYREL) tablet 50 mg  50 mg Oral Nightly Misael Luu MD   50 mg at 05/23/20 2323    ferrous gluconate 324 (37.5 Fe) MG tablet 324 mg  324 mg Oral BID Misael Luu MD   324 mg at 05/23/20 2332    glucose (GLUTOSE) 40 % oral gel 15 g  15 g Oral PRN Norm Huseyin Colon MD        dextrose 50 % IV solution  12.5 g Intravenous PRN Norm Huseyin Colon MD        glucagon (rDNA) injection 1 mg  1 mg Intramuscular PRN Norm Huseyin Colon MD        dextrose 5 % solution  100 mL/hr Intravenous PRN Norm Huseyin Colon MD        cefTRIAXone (ROCEPHIN) 1 g IVPB in 50 mL D5W minibag  1 g Intravenous Q24H Norm Huseyin Colon MD         Allergies   Allergen Reactions    Chocolate Other (See Comments)     Throbbing headache    Nuts [Peanut-Containing Drug Products] Other (See Comments)     \"makes my nose run and like i have a bad cold\"     Active Problems:    Dizziness    PVC (premature ventricular contraction)    Diabetes mellitus ([address]    Chest pain    Severe aortic stenosis  Resolved Problems:    * No resolved hospital problems. *    Blood pressure (!) 152/72, pulse 66, temperature 98.4 °F (36.9 °C), temperature source Oral, resp. rate 16, height 5' 4\" (1.626 m), weight 130 lb (59 kg), SpO2 97 %. Subjective:  Symptoms:  Stable. Diet:  Poor intake. Pain:  She reports no pain. Objective:  General Appearance:  Comfortable. Vital signs: (most recent): Blood pressure (!) 152/72, pulse 66, temperature 98.4 °F (36.9 °C), temperature source Oral, resp. rate 16, height 5' 4\" (1.626 m), weight 130 lb (59 kg), SpO2 97 %. Vital signs are normal.    HEENT: Normal HEENT exam.    Lungs:  Normal effort. Heart: Normal rate. Positive for murmur. Abdomen: Abdomen is soft. Bowel sounds are normal.     Extremities: Decreased range of motion. Neurological: Patient is alert.     Pupils:

## 2020-05-24 NOTE — H&P
HISTORY AND PHYSICAL  (Hospitalist, Internal Medicine)  IDENTIFYING INFORMATION   PATIENT:  Darcy Kapadia  MRN:  [id number]  ADMIT DATE: 5/23/2020  TIME OF EVALUATION: 5/23/2020 8:48 PM    CHIEF COMPLAINT     Dysphasia and shortness of breath  HISTORY OF PRESENT ILLNESS   Darcy Kapadia is a 78 y.o. female admitted for dysphasia and shortness has been going on for some weeks. Patient states that she has had some follow-up, outpatient for the shortness of breath, does not realize that she has aortic stenosis, however is somewhat familiar that she needs further work-up. Patient is alert oriented x4, and presents to the hospital for dysphasia and constantly having to clear her throat, denies cough, has shortness of breath on exertion. Patient denies fevers, or sick contacts. Pt otherwise has no complaints of CP, SOB, dizziness, N/V/C/D, abdominal pain, dysuria, joint pains, rash/boils, or fevers. However was noted that patient may be confused at times, as per report from daughter, emergency department diagnosed UTI, and started ceftriaxone, as possible relation to her intermittent AMS. PMH listed below:    PAST MEDICAL, SURGICAL, FAMILY, and SOCIAL HISTORY     Past Medical History:   Diagnosis Date    Anxiety     Cellulitis     foot.  Cir[facility]osis ([facility] 02/27/2020    seen on CT scan    Closed compression fracture of L2 lumbar vertebra, initial encounter ([facility] [address] 02/29/2020    seen on CT    Constipation     Depression     Diabetes mellitus ([facility] [address]     Hyperlipidemia     Hypertension     Insomnia     Rosacea     Sinus tachycardia      Past Surgical History:   Procedure Laterality Date    BACK SURGERY      HYSTERECTOMY      TOE SURGERY       No family history on file.   Family Hx of HTN  Family Hx as reviewed above, otherwise non-contributory  Social History     Socioeconomic History    Marital status:      Spouse name: Not on file    Number of children: Not on file    Years of education: Not on file    Highest education level: Not on file   Occupational History    Not on file   Social Needs    Financial resource strain: Not on file    Food insecurity     Worry: Not on file     Inability: Not on file    Transportation needs     Medical: Not on file     Non-medical: Not on file   Tobacco Use    Smoking status: Never Smoker    Smokeless tobacco: Never Used   Substance and Sexual Activity    Alcohol use: No    Drug use: No    Sexual activity: Not on file   Lifestyle    Physical activity     Days per week: Not on file     Minutes per session: Not on file    Stress: Not on file   Relationships    Social connections     Talks on phone: Not on file     Gets together: Not on file     Attends Sabianism service: Not on file     Active member of club or organization: Not on file     Attends meetings of clubs or organizations: Not on file     Relationship status: Not on file    Intimate partner violence     Fear of current or ex partner: Not on file     Emotionally abused: Not on file     Physically abused: Not on file     Forced sexual activity: Not on file   Other Topics Concern    Not on file   Social History Narrative    Not on file       MEDICATIONS   Medications Prior to Admission  Medications Prior to Admission: busPIRone (BUSPAR) 15 MG tablet, Take 15 mg by mouth 3 times daily  traZODone (DESYREL) 50 MG tablet, Take 1 tablet by mouth nightly  ferrous gluconate 324 (37.5 Fe) MG TABS, Take 1 tablet by mouth 2 times daily  omeprazole (PRILOSEC) 20 MG delayed release capsule, Take 20 mg by mouth daily  simvastatin (ZOCOR) 40 MG tablet, Take 40 mg by mouth nightly  losartan (COZAAR) 50 MG tablet, Take 1 tablet by mouth daily  carvedilol (COREG) 25 MG tablet, Take 1 tablet by mouth 2 times daily (with meals)  sitaGLIPtin-metFORMIN (JANUMET XR)  MG TB24 tablet, Take 1 tablet by mouth 2 times daily  escitalopram (LEXAPRO) 20 MG tablet, Take 20 mg by mouth daily  aspirin 81 MG EC tablet, Take 81

## 2020-05-25 ENCOUNTER — APPOINTMENT (OUTPATIENT)
Dept: CT IMAGING | Age: 80
DRG: 392 | End: 2020-05-25
Payer: MEDICARE

## 2020-05-25 PROBLEM — E44.0 MODERATE MALNUTRITION (HCC): Chronic | Status: ACTIVE | Noted: 2020-05-25

## 2020-05-25 LAB
AMMONIA: 53 UMOL/L (ref 11–51)
ANION GAP SERPL CALCULATED.3IONS-SCNC: 11 MMOL/L (ref 4–16)
BUN BLDV-MCNC: 9 MG/DL (ref 6–23)
CALCIUM SERPL-MCNC: 8.7 MG/DL (ref 8.3–10.6)
CHLORIDE BLD-SCNC: 102 MMOL/L (ref 99–110)
CO2: 25 MMOL/L (ref 21–32)
CREAT SERPL-MCNC: 0.9 MG/DL (ref 0.6–1.1)
FERRITIN: 46 NG/ML (ref 15–150)
GFR AFRICAN AMERICAN: >60 ML/MIN/1.73M2
GFR NON-AFRICAN AMERICAN: 60 ML/MIN/1.73M2
GLUCOSE BLD-MCNC: 116 MG/DL (ref 70–99)
GLUCOSE BLD-MCNC: 127 MG/DL (ref 70–99)
GLUCOSE BLD-MCNC: 130 MG/DL (ref 70–99)
GLUCOSE BLD-MCNC: 150 MG/DL (ref 70–99)
GLUCOSE BLD-MCNC: 172 MG/DL (ref 70–99)
HBV SURFACE AB TITR SER: <3.5 {TITER}
HCT VFR BLD CALC: 36 % (ref 37–47)
HEMOGLOBIN: 11.7 GM/DL (ref 12.5–16)
MCH RBC QN AUTO: 31.1 PG (ref 27–31)
MCHC RBC AUTO-ENTMCNC: 32.5 % (ref 32–36)
MCV RBC AUTO: 95.7 FL (ref 78–100)
PDW BLD-RTO: 14.6 % (ref 11.7–14.9)
PLATELET # BLD: 109 K/CU MM (ref 140–440)
PMV BLD AUTO: 9.9 FL (ref 7.5–11.1)
POTASSIUM SERPL-SCNC: 4 MMOL/L (ref 3.5–5.1)
RBC # BLD: 3.76 M/CU MM (ref 4.2–5.4)
SODIUM BLD-SCNC: 138 MMOL/L (ref 135–145)
WBC # BLD: 2.4 K/CU MM (ref 4–10.5)

## 2020-05-25 PROCEDURE — 82962 GLUCOSE BLOOD TEST: CPT

## 2020-05-25 PROCEDURE — 99232 SBSQ HOSP IP/OBS MODERATE 35: CPT | Performed by: INTERNAL MEDICINE

## 2020-05-25 PROCEDURE — 6370000000 HC RX 637 (ALT 250 FOR IP): Performed by: SPECIALIST

## 2020-05-25 PROCEDURE — 6370000000 HC RX 637 (ALT 250 FOR IP): Performed by: INTERNAL MEDICINE

## 2020-05-25 PROCEDURE — APPSS30 APP SPLIT SHARED TIME 16-30 MINUTES: Performed by: NURSE PRACTITIONER

## 2020-05-25 PROCEDURE — 82728 ASSAY OF FERRITIN: CPT

## 2020-05-25 PROCEDURE — 86706 HEP B SURFACE ANTIBODY: CPT

## 2020-05-25 PROCEDURE — 85027 COMPLETE CBC AUTOMATED: CPT

## 2020-05-25 PROCEDURE — 6370000000 HC RX 637 (ALT 250 FOR IP): Performed by: HOSPITALIST

## 2020-05-25 PROCEDURE — 1200000000 HC SEMI PRIVATE

## 2020-05-25 PROCEDURE — 82105 ALPHA-FETOPROTEIN SERUM: CPT

## 2020-05-25 PROCEDURE — 94761 N-INVAS EAR/PLS OXIMETRY MLT: CPT

## 2020-05-25 PROCEDURE — 86708 HEPATITIS A ANTIBODY: CPT

## 2020-05-25 PROCEDURE — 2580000003 HC RX 258: Performed by: INTERNAL MEDICINE

## 2020-05-25 PROCEDURE — 81256 HFE GENE: CPT

## 2020-05-25 PROCEDURE — 6360000002 HC RX W HCPCS: Performed by: INTERNAL MEDICINE

## 2020-05-25 PROCEDURE — 70450 CT HEAD/BRAIN W/O DYE: CPT

## 2020-05-25 PROCEDURE — 82140 ASSAY OF AMMONIA: CPT

## 2020-05-25 PROCEDURE — 80048 BASIC METABOLIC PNL TOTAL CA: CPT

## 2020-05-25 PROCEDURE — 82103 ALPHA-1-ANTITRYPSIN TOTAL: CPT

## 2020-05-25 PROCEDURE — 83516 IMMUNOASSAY NONANTIBODY: CPT

## 2020-05-25 RX ADMIN — BUSPIRONE HYDROCHLORIDE 15 MG: 15 TABLET ORAL at 10:39

## 2020-05-25 RX ADMIN — LACTULOSE 10 G: 10 SOLUTION ORAL at 22:24

## 2020-05-25 RX ADMIN — ACETAMINOPHEN 650 MG: 325 TABLET ORAL at 14:53

## 2020-05-25 RX ADMIN — BUSPIRONE HYDROCHLORIDE 15 MG: 15 TABLET ORAL at 22:24

## 2020-05-25 RX ADMIN — BUSPIRONE HYDROCHLORIDE 15 MG: 15 TABLET ORAL at 14:29

## 2020-05-25 RX ADMIN — LACTULOSE 10 G: 10 SOLUTION ORAL at 10:39

## 2020-05-25 RX ADMIN — ESCITALOPRAM OXALATE 20 MG: 10 TABLET ORAL at 10:38

## 2020-05-25 RX ADMIN — ATORVASTATIN CALCIUM 20 MG: 20 TABLET, FILM COATED ORAL at 22:24

## 2020-05-25 RX ADMIN — CEFTRIAXONE 1 G: 1 INJECTION, POWDER, FOR SOLUTION INTRAMUSCULAR; INTRAVENOUS at 17:41

## 2020-05-25 RX ADMIN — ASPIRIN 81 MG: 81 TABLET, COATED ORAL at 10:38

## 2020-05-25 RX ADMIN — PANTOPRAZOLE SODIUM 40 MG: 40 TABLET, DELAYED RELEASE ORAL at 05:08

## 2020-05-25 RX ADMIN — LOSARTAN POTASSIUM 50 MG: 25 TABLET, FILM COATED ORAL at 10:38

## 2020-05-25 RX ADMIN — SODIUM CHLORIDE, PRESERVATIVE FREE 10 ML: 5 INJECTION INTRAVENOUS at 10:41

## 2020-05-25 RX ADMIN — ALPRAZOLAM 0.25 MG: 0.25 TABLET ORAL at 14:29

## 2020-05-25 RX ADMIN — FERROUS GLUCONATE TAB 324 MG (37.5 MG ELEMENTAL IRON) 324 MG: 324 (37.5 FE) TAB at 22:24

## 2020-05-25 RX ADMIN — FERROUS GLUCONATE TAB 324 MG (37.5 MG ELEMENTAL IRON) 324 MG: 324 (37.5 FE) TAB at 10:38

## 2020-05-25 RX ADMIN — ALPRAZOLAM 0.25 MG: 0.25 TABLET ORAL at 05:08

## 2020-05-25 RX ADMIN — SODIUM CHLORIDE, PRESERVATIVE FREE 10 ML: 5 INJECTION INTRAVENOUS at 22:25

## 2020-05-25 RX ADMIN — ALPRAZOLAM 0.25 MG: 0.25 TABLET ORAL at 22:24

## 2020-05-25 RX ADMIN — TRAZODONE HYDROCHLORIDE 50 MG: 50 TABLET ORAL at 22:25

## 2020-05-25 ASSESSMENT — PAIN SCALES - GENERAL
PAINLEVEL_OUTOF10: 0
PAINLEVEL_OUTOF10: 3
PAINLEVEL_OUTOF10: 0

## 2020-05-25 NOTE — PROGRESS NOTES
sounds are normal.     Extremities: Decreased range of motion. Neurological: Patient is alert. Pupils:  Pupils are equal, round, and reactive to light. Skin:  Warm. Assessment & Plan  Dysphagia  -consult GI and egd tomorrow  UTI  -rocephin and urine cx pending  DM  -SSI  HTN  -BB, ARB  Severe AS  -TAVR in future and CT chest outpt  HTN  -BB, ARB  Leukopenia and thrombocytopenia  -chronic and suspect from cirrhosis  Cirrhosis with suspected hepatic encephalopathy  - hepatitis panel neg and work up in progress  -recheck ammonia, CT head, lactulose  Schizophrenia paranoid  -lexapro, buspar and xananx  DVT prophyalxis  -SCD as thrombocytopenic      Talked with jodie her daughter and she states when ever her mother gets admitted to the hospital she gets confused and paranoid as she usually stays at home and does not like to leave due to schizophrenia. She states once her mother is home her mental status improves. She follows with psych Dr Bell Poster for 15yrs. Went over her psych meds and correct.    Dominic Doss MD  5/25/2020

## 2020-05-25 NOTE — PROGRESS NOTES
Nutrition Assessment    Type and Reason for Visit: Initial, Positive Nutrition Screen    Nutrition Recommendations:   · Continue current diet  · Start low calorie, high protein supplements BID    Nutrition Assessment: Pt fell out due to a positive screen for weight loss. Pt has lost 7.1% of her body weight in the past 3 months. Pt states that this is unintentional. Will order supplements and continue to follow to assess intake. Malnutrition Assessment:  · Malnutrition Status: Meets the criteria for moderate malnutrition  · Context: Chronic illness  · Findings of the 6 clinical characteristics of malnutrition (Minimum of 2 out of 6 clinical characteristics is required to make the diagnosis of moderate or severe Protein Calorie Malnutrition based on AND/ASPEN Guidelines):  1. Energy Intake-Less than or equal to 75% of estimated energy requirement, Greater than or equal to 3 months    2. Weight Loss-5% loss or greater, in 3 months  3. Fat Loss-Unable to assess  4. Muscle Loss-Unable to assess  5. Fluid Accumulation-No significant fluid accumulation, Generalized  6.  Strength-Not measured    Nutrition Risk Level: High    Nutrient Needs:  · Estimated Daily Total Kcal: 7647-4457 based on MSJ  · Estimated Daily Protein (g): 54-65 based on 1-1.2 g/kg/IBW  · Estimated Daily Total Fluid (ml/day): 1563-2592 based on 1 mL/kcal    Nutrition Diagnosis:   · Problem:  Moderate malnutrition, In context of chronic illness  · Etiology: related to Insufficient energy/nutrient consumption     Signs and symptoms:  as evidenced by Weight loss, Diet history of poor intake    Objective Information:  · Wound Type: None  · Current Nutrition Therapies:  · Oral Diet Orders: Cardiac, Dysphagia Minced and Moist (Dysphagia 2)   · Oral Diet intake: Unable to assess  · Oral Nutrition Supplement (ONS) Orders: None  · Anthropometric Measures:  · Ht: 5' 4\" (162.6 cm)   · Current Body Wt: 130 lb (59 kg)  · Admission Body Wt: 130 lb (59 kg)  · Usual Body Wt: 140 lb (63.5 kg)  · % Weight Change: -7.1% in three months  · Ideal Body Wt: 120 lb (54.4 kg), % Ideal Body 108%  · BMI Classification: BMI 18.5 - 24.9 Normal Weight    Nutrition Interventions:   Continue current diet, Start ONS  Continued Inpatient Monitoring, Education Not Indicated, Coordination of Care    Nutrition Evaluation:   · Evaluation: Goals set   · Goals: pt will consume greater than 75% of her meals and supplements    · Monitoring: Meal Intake, Supplement Intake, Pertinent Labs, Weight, Mental Status/Confusion      Electronically signed by Deena Long RD, LUPIS on 5/36/80 at 11:02 AM EDT    Contact Number: [phone]

## 2020-05-25 NOTE — PROGRESS NOTES
She is very disoriented and confused this morning  I attempted unsuccessfully to reach her daughter Humble Kauffman but left a message for her to call me back  Ideally would like to do EGD tomorrow to evaluate the dysphagia and screen for varices  She likely will need ECF placement after discharge

## 2020-05-26 ENCOUNTER — ANESTHESIA EVENT (OUTPATIENT)
Dept: ENDOSCOPY | Age: 80
DRG: 392 | End: 2020-05-26
Payer: MEDICARE

## 2020-05-26 ENCOUNTER — ANESTHESIA (OUTPATIENT)
Dept: ENDOSCOPY | Age: 80
DRG: 392 | End: 2020-05-26
Payer: MEDICARE

## 2020-05-26 VITALS — DIASTOLIC BLOOD PRESSURE: 53 MMHG | OXYGEN SATURATION: 100 % | SYSTOLIC BLOOD PRESSURE: 130 MMHG

## 2020-05-26 LAB
CULTURE: ABNORMAL
CULTURE: ABNORMAL
GLUCOSE BLD-MCNC: 122 MG/DL (ref 70–99)
GLUCOSE BLD-MCNC: 232 MG/DL (ref 70–99)
GLUCOSE BLD-MCNC: 89 MG/DL (ref 70–99)
Lab: ABNORMAL
SPECIMEN: ABNORMAL
TOTAL COLONY COUNT: ABNORMAL

## 2020-05-26 PROCEDURE — 2580000003 HC RX 258: Performed by: SPECIALIST

## 2020-05-26 PROCEDURE — C1726 CATH, BAL DIL, NON-VASCULAR: HCPCS | Performed by: SPECIALIST

## 2020-05-26 PROCEDURE — 1200000000 HC SEMI PRIVATE

## 2020-05-26 PROCEDURE — 6360000002 HC RX W HCPCS: Performed by: NURSE ANESTHETIST, CERTIFIED REGISTERED

## 2020-05-26 PROCEDURE — 2580000003 HC RX 258: Performed by: NURSE PRACTITIONER

## 2020-05-26 PROCEDURE — 82962 GLUCOSE BLOOD TEST: CPT

## 2020-05-26 PROCEDURE — 2500000003 HC RX 250 WO HCPCS: Performed by: NURSE ANESTHETIST, CERTIFIED REGISTERED

## 2020-05-26 PROCEDURE — 97165 OT EVAL LOW COMPLEX 30 MIN: CPT

## 2020-05-26 PROCEDURE — 6370000000 HC RX 637 (ALT 250 FOR IP): Performed by: SPECIALIST

## 2020-05-26 PROCEDURE — 97162 PT EVAL MOD COMPLEX 30 MIN: CPT

## 2020-05-26 PROCEDURE — 6360000002 HC RX W HCPCS: Performed by: SPECIALIST

## 2020-05-26 PROCEDURE — 0D758ZZ DILATION OF ESOPHAGUS, VIA NATURAL OR ARTIFICIAL OPENING ENDOSCOPIC: ICD-10-PCS | Performed by: SPECIALIST

## 2020-05-26 PROCEDURE — 3609017700 HC EGD DILATION GASTRIC/DUODENAL STRICTURE: Performed by: SPECIALIST

## 2020-05-26 PROCEDURE — 94761 N-INVAS EAR/PLS OXIMETRY MLT: CPT

## 2020-05-26 PROCEDURE — 2709999900 HC NON-CHARGEABLE SUPPLY: Performed by: SPECIALIST

## 2020-05-26 PROCEDURE — 3700000000 HC ANESTHESIA ATTENDED CARE: Performed by: SPECIALIST

## 2020-05-26 PROCEDURE — 2580000003 HC RX 258: Performed by: ANESTHESIOLOGY

## 2020-05-26 PROCEDURE — 97530 THERAPEUTIC ACTIVITIES: CPT

## 2020-05-26 PROCEDURE — 3700000001 HC ADD 15 MINUTES (ANESTHESIA): Performed by: SPECIALIST

## 2020-05-26 PROCEDURE — 6360000002 HC RX W HCPCS: Performed by: NURSE PRACTITIONER

## 2020-05-26 PROCEDURE — 92610 EVALUATE SWALLOWING FUNCTION: CPT

## 2020-05-26 PROCEDURE — 2580000003 HC RX 258: Performed by: NURSE ANESTHETIST, CERTIFIED REGISTERED

## 2020-05-26 PROCEDURE — 97535 SELF CARE MNGMENT TRAINING: CPT

## 2020-05-26 RX ORDER — LIDOCAINE HYDROCHLORIDE 20 MG/ML
INJECTION, SOLUTION INFILTRATION; PERINEURAL PRN
Status: DISCONTINUED | OUTPATIENT
Start: 2020-05-26 | End: 2020-05-26 | Stop reason: SDUPTHER

## 2020-05-26 RX ORDER — SODIUM CHLORIDE 0.9 % (FLUSH) 0.9 %
10 SYRINGE (ML) INJECTION PRN
Status: CANCELLED | OUTPATIENT
Start: 2020-05-26

## 2020-05-26 RX ORDER — PROPOFOL 10 MG/ML
INJECTION, EMULSION INTRAVENOUS PRN
Status: DISCONTINUED | OUTPATIENT
Start: 2020-05-26 | End: 2020-05-26 | Stop reason: SDUPTHER

## 2020-05-26 RX ORDER — SODIUM CHLORIDE, SODIUM LACTATE, POTASSIUM CHLORIDE, CALCIUM CHLORIDE 600; 310; 30; 20 MG/100ML; MG/100ML; MG/100ML; MG/100ML
INJECTION, SOLUTION INTRAVENOUS ONCE
Status: COMPLETED | OUTPATIENT
Start: 2020-05-26 | End: 2020-05-26

## 2020-05-26 RX ORDER — SODIUM CHLORIDE 0.9 % (FLUSH) 0.9 %
10 SYRINGE (ML) INJECTION EVERY 12 HOURS SCHEDULED
Status: CANCELLED | OUTPATIENT
Start: 2020-05-26

## 2020-05-26 RX ORDER — SODIUM CHLORIDE 9 MG/ML
INJECTION, SOLUTION INTRAVENOUS CONTINUOUS PRN
Status: DISCONTINUED | OUTPATIENT
Start: 2020-05-26 | End: 2020-05-26 | Stop reason: SDUPTHER

## 2020-05-26 RX ADMIN — SODIUM CHLORIDE, PRESERVATIVE FREE 10 ML: 5 INJECTION INTRAVENOUS at 22:27

## 2020-05-26 RX ADMIN — PROPOFOL 100 MG: 10 INJECTION, EMULSION INTRAVENOUS at 08:24

## 2020-05-26 RX ADMIN — BUSPIRONE HYDROCHLORIDE 15 MG: 15 TABLET ORAL at 22:24

## 2020-05-26 RX ADMIN — SODIUM CHLORIDE: 9 INJECTION, SOLUTION INTRAVENOUS at 08:36

## 2020-05-26 RX ADMIN — SODIUM CHLORIDE, POTASSIUM CHLORIDE, SODIUM LACTATE AND CALCIUM CHLORIDE: 600; 310; 30; 20 INJECTION, SOLUTION INTRAVENOUS at 11:39

## 2020-05-26 RX ADMIN — CARVEDILOL 25 MG: 25 TABLET, FILM COATED ORAL at 22:25

## 2020-05-26 RX ADMIN — LOSARTAN POTASSIUM 50 MG: 25 TABLET, FILM COATED ORAL at 10:35

## 2020-05-26 RX ADMIN — CEFTRIAXONE 1 G: 1 INJECTION, POWDER, FOR SOLUTION INTRAMUSCULAR; INTRAVENOUS at 17:13

## 2020-05-26 RX ADMIN — ESCITALOPRAM OXALATE 20 MG: 10 TABLET ORAL at 10:35

## 2020-05-26 RX ADMIN — LACTULOSE 10 G: 10 SOLUTION ORAL at 10:35

## 2020-05-26 RX ADMIN — CARVEDILOL 25 MG: 25 TABLET, FILM COATED ORAL at 10:36

## 2020-05-26 RX ADMIN — LIDOCAINE HYDROCHLORIDE 100 MG: 20 INJECTION, SOLUTION INFILTRATION; PERINEURAL at 08:24

## 2020-05-26 RX ADMIN — FERROUS GLUCONATE TAB 324 MG (37.5 MG ELEMENTAL IRON) 324 MG: 324 (37.5 FE) TAB at 22:36

## 2020-05-26 RX ADMIN — SODIUM CHLORIDE, PRESERVATIVE FREE 10 ML: 5 INJECTION INTRAVENOUS at 10:36

## 2020-05-26 RX ADMIN — ALPRAZOLAM 0.25 MG: 0.25 TABLET ORAL at 22:36

## 2020-05-26 RX ADMIN — ASPIRIN 81 MG: 81 TABLET, COATED ORAL at 10:35

## 2020-05-26 RX ADMIN — ONDANSETRON 4 MG: 2 INJECTION INTRAMUSCULAR; INTRAVENOUS at 13:59

## 2020-05-26 RX ADMIN — FERROUS GLUCONATE TAB 324 MG (37.5 MG ELEMENTAL IRON) 324 MG: 324 (37.5 FE) TAB at 10:36

## 2020-05-26 RX ADMIN — TRAZODONE HYDROCHLORIDE 50 MG: 50 TABLET ORAL at 22:24

## 2020-05-26 RX ADMIN — BUSPIRONE HYDROCHLORIDE 15 MG: 15 TABLET ORAL at 14:00

## 2020-05-26 RX ADMIN — PHENYLEPHRINE HYDROCHLORIDE 25 MCG: 10 INJECTION INTRAVENOUS at 08:26

## 2020-05-26 RX ADMIN — ATORVASTATIN CALCIUM 20 MG: 20 TABLET, FILM COATED ORAL at 22:36

## 2020-05-26 RX ADMIN — HYDRALAZINE HYDROCHLORIDE 10 MG: 20 INJECTION INTRAMUSCULAR; INTRAVENOUS at 05:06

## 2020-05-26 RX ADMIN — BUSPIRONE HYDROCHLORIDE 15 MG: 15 TABLET ORAL at 10:35

## 2020-05-26 RX ADMIN — PHENYLEPHRINE HYDROCHLORIDE 25 MCG: 10 INJECTION INTRAVENOUS at 08:25

## 2020-05-26 ASSESSMENT — PAIN SCALES - GENERAL
PAINLEVEL_OUTOF10: 0
PAINLEVEL_OUTOF10: 0

## 2020-05-26 ASSESSMENT — PAIN SCALES - WONG BAKER: WONGBAKER_NUMERICALRESPONSE: 0

## 2020-05-26 NOTE — PROGRESS NOTES
Dysfunction  Decreased Laryngeal Elevation: All    Prognosis  Prognosis  Prognosis for safe diet advancement: good    Education  Patient Education Response: Verbalizes understanding             Therapy Time  SLP Individual Minutes  Time In: 1120  Time Out: [address]  Minutes: Katty Mckeon [address], [id number] [facility], 5/26/2020

## 2020-05-26 NOTE — CONSULTS
Allergies:   Allergies   Allergen Reactions    Chocolate Other (See Comments)     Throbbing headache    Nuts [Peanut-Containing Drug Products] Other (See Comments)     \"makes my nose run and like i have a bad cold\"        OBJECTIVE  Vital Signs:  Vitals:    05/26/20 1035   BP: (!) 114/51   Pulse: 72   Resp:    Temp:    SpO2:        Labs:  Recent Results (from the past 48 hour(s))   POCT Glucose    Collection Time: 05/24/20  6:06 PM   Result Value Ref Range    POC Glucose 98 70 - 99 MG/DL   POCT Glucose    Collection Time: 05/24/20  9:04 PM   Result Value Ref Range    POC Glucose 96 70 - 99 MG/DL   Hepatitis B Surface Antibody    Collection Time: 05/25/20 12:01 AM   Result Value Ref Range    Hep B S Ab <3.5    Ferritin    Collection Time: 05/25/20 12:01 AM   Result Value Ref Range    Ferritin 46 15 - 150 NG/ML   POCT Glucose    Collection Time: 05/25/20  8:29 AM   Result Value Ref Range    POC Glucose 116 (H) 70 - 99 MG/DL   Ammonia    Collection Time: 05/25/20 11:52 AM   Result Value Ref Range    Ammonia 53 (H) 11 - 51 UMOL/L   CBC    Collection Time: 05/25/20 11:52 AM   Result Value Ref Range    WBC 2.4 (L) 4.0 - 10.5 K/CU MM    RBC 3.76 (L) 4.2 - 5.4 M/CU MM    Hemoglobin 11.7 (L) 12.5 - 16.0 GM/DL    Hematocrit 36.0 (L) 37 - 47 %    MCV 95.7 78 - 100 FL    MCH 31.1 (H) 27 - 31 PG    MCHC 32.5 32.0 - 36.0 %    RDW 14.6 11.7 - 14.9 %    Platelets 982 (L) 956 - 440 K/CU MM    MPV 9.9 7.5 - 11.1 FL   Basic metabolic panel    Collection Time: 05/25/20 11:52 AM   Result Value Ref Range    Sodium 138 135 - 145 MMOL/L    Potassium 4.0 3.5 - 5.1 MMOL/L    Chloride 102 99 - 110 mMol/L    CO2 25 21 - 32 MMOL/L    Anion Gap 11 4 - 16    BUN 9 6 - 23 MG/DL    CREATININE 0.9 0.6 - 1.1 MG/DL    Glucose 172 (H) 70 - 99 MG/DL    Calcium 8.7 8.3 - 10.6 MG/DL    GFR Non-African American 60 (L) >60 mL/min/1.73m2    GFR African American >60 >60 mL/min/1.73m2   POCT Glucose    Collection Time: 05/25/20 12:22 PM   Result Value Ref Range    POC Glucose 150 (H) 70 - 99 MG/DL   POCT Glucose    Collection Time: 05/25/20  5:24 PM   Result Value Ref Range    POC Glucose 127 (H) 70 - 99 MG/DL   POCT Glucose    Collection Time: 05/25/20  9:13 PM   Result Value Ref Range    POC Glucose 130 (H) 70 - 99 MG/DL   POCT Glucose    Collection Time: 05/26/20  9:20 AM   Result Value Ref Range    POC Glucose 122 (H) 70 - 99 MG/DL   POCT Glucose    Collection Time: 05/26/20 12:22 PM   Result Value Ref Range    POC Glucose 232 (H) 70 - 99 MG/DL       Review of Systems:  Reports of no current cardiovascular, respiratory, gastrointestinal, genitourinary, integumentary, neurological, muscuoskeletal, or immunological symptoms today. PSYCHIATRIC: See HPI above. PSYCHIATRIC EXAMINATION / MENTAL STATUS EXAM    CONSTITUTIONAL:    Vitals:   Vitals:    05/26/20 1035   BP: (!) 114/51   Pulse: 72   Resp:    Temp:    SpO2:      Due to phone interview, items that remain unchecked could not be assessed.     General appearance: [] appears age, []  appears older than stated age,               []  adequately dressed and groomed, [] disheveled,               []  in no acute distress, [] appears mildly distressed, [] other           MUSCULOSKELETAL:   Gait:   [] normal, [] antalgic, [] unsteady, [] gait not evaluated   Station:             [] erect, [] sitting, [] recumbent, [] other        Strength/tone:  [] muscle strength and tone appear consistent with age and condition     [] atrophy      [] abnormal movements  PSYCHIATRIC:    Relatedness:  [x] cooperative, [] guarded, [] indifferent, [] hostile,      [] sedated  Speech:  [] normal prosody, [x] pressured, [] decreased volume,    [] increased volume [] slurred [] slowed, [] delayed     [] echolalia, [] incoherent, [] stuttering   Eye contact:  [] direct, [] fleeting , [] intense []  none  Kinetics:  [] normal, [] increased, [] decreased  Mood:   [] stable, [x] depressed, [x] anxious, [] irritable,     [] labile  [] euphoric

## 2020-05-26 NOTE — BRIEF OP NOTE
BRIEF EGD REPORT:     Photos and full EGD report available by going to [employer] review\" then \"procedures\" then  \"EGD\" then \"View Endoscopy Report\"     IMPRESSION :    1) low-grade esophageal ring at the GEJ- dilated fully with TTS balloon dilator 18-20 mm   2) portal hypertensive gastropathy   3) no significant esophageal ior gastric varices noted   4) mild erosive gastritis in the antrum        Suggest:   1) cardiology follow up as outpatient   2) follow up with me in 3 months for the cirrhosis   3) Hep B vaccine #1 now- needs #2 in 1 month and #3 in 6-12 months   4) Hep A Ab (to determine need for Hep A vaccine) pending

## 2020-05-26 NOTE — PROGRESS NOTES
05/26/20 1035   BP: (!) 114/51   Pulse: 72   Resp:    Temp:    SpO2:      Physical Exam:   GEN: Awake female,  female. Answers questions appropriate. No acute distress. EYES: Ocular muscles intact. No eye discharge. HENT: Normocephalic atraumatic. No nasal discharge. NECK: Supple,  RESP: Clear to auscultation bilaterally. No wheezing. No crackles. Symmetric breath sounds. CV: Regular rate and rhythm. No murmur. No peripheral edema. GI: Soft abdomen. Nontender. Nondistended. : No Lewis in place. MSK: No bone fractures. No gross deformities.   SKIN: warm, dry, no rashes  NEURO: Cranial nerves appear grossly intact, normal speech  PSYCH: Awake, alert, oriented    Medications:   Medications:    lactulose  10 g Oral BID    sodium chloride flush  10 mL Intravenous 2 times per day    aspirin  81 mg Oral Daily    busPIRone  15 mg Oral TID    carvedilol  25 mg Oral BID WC    escitalopram  20 mg Oral Daily    losartan  50 mg Oral Daily    pantoprazole  40 mg Oral QAM AC    insulin lispro  0-12 Units Subcutaneous TID WC    insulin lispro  0-6 Units Subcutaneous Nightly    atorvastatin  20 mg Oral Daily    traZODone  50 mg Oral Nightly    ferrous gluconate  324 mg Oral BID    cefTRIAXone (ROCEPHIN) IV  1 g Intravenous Q24H      Infusions:    dextrose       PRN Meds: ALPRAZolam, 0.25 mg, TID PRN  sodium chloride flush, 10 mL, PRN  acetaminophen, 650 mg, Q6H PRN    Or  acetaminophen, 650 mg, Q6H PRN  polyethylene glycol, 17 g, Daily PRN  promethazine, 12.5 mg, Q6H PRN    Or  ondansetron, 4 mg, Q6H PRN  nitroGLYCERIN, 0.4 mg, Q5 Min PRN  glucose, 15 g, PRN  dextrose, 12.5 g, PRN  glucagon (rDNA), 1 mg, PRN  dextrose, 100 mL/hr, PRN      Electronically signed by Sylvia Paez MD on 5/26/2020 at 1:05 PM

## 2020-05-26 NOTE — PROGRESS NOTES
having to take care of things at home on her own. · Vision:  [facility]  · Hearing:  [facility]   · Cardiopulmonary: stable vitals throughout session. · Orientation: [facility]     Musculoskeletal  · ROM R/L:  WFL BLEs  · Strength R/L:  BLEs 5/5 though dec strength and stability observed in function and endurance. · Neuro:  [facility]     Mobility/treatment:   · Rolling L/R:  NT   · Supine to sit:  NT, sitting in recliner upon arrival   · Transfers:   · Sit to stand: CGA/SBA for safety from recliner and toilet   · Stand to sit: CGA/SBA for safety to toilet and recliner. VCs for safe hand sequencing to control sit. · Step pivot: CGA for safety with RW (2x)  · Sitting balance:  SBA at toilet and on edge of recliner SBA with static and light dynamic. Able to manage jayleen care with supervision. · Standing balance:  CGA/SBA at sink performing hand hygiene tasks without UE support   · Gait: 10ft x 2 with RW CGA for safety. Some safety concerns with use of RW needing inc cues for body positioning in relation to AD. Dec overall pace with slightly fwd posture. Declined further ambulation at this time. · Educated pt on POC, role of PT, safe DME use, discharge options/recommendations. VCs for sequencing, posture, UE/LE placement to inc safety and indep with mobility. [facility] 6 Clicks Inpatient Mobility:  AM-PAC Inpatient Mobility Raw Score : 18     Safety: patient left in chair with chair alarm, call light within reach,  gait belt used. Assessment: Body structures, Functions, Activity limitations: Decreased functional mobility ; Decreased safe awareness; Decreased endurance; Decreased balance; Decreased strength  Pt is an 78year old female admitted with SOB and dysphagia. Recent cardiac work up showed severe aortic stenosis and TAVR procedure was discussed with pt last visit. Recommend pt have initial 24/7 supervision and continued therapy once medically stable ( PT vs short term SNF).  She performed well this date though with some

## 2020-05-27 VITALS
HEART RATE: 67 BPM | RESPIRATION RATE: 14 BRPM | OXYGEN SATURATION: 97 % | TEMPERATURE: 98.9 F | HEIGHT: 64 IN | WEIGHT: 127.7 LBS | BODY MASS INDEX: 21.8 KG/M2 | DIASTOLIC BLOOD PRESSURE: 66 MMHG | SYSTOLIC BLOOD PRESSURE: 140 MMHG

## 2020-05-27 LAB
ALPHA-1 ANTITRYPSIN: 108 MG/DL (ref 90–200)
ANTI-MITOCHON TITER: 4.2 UNITS (ref 0–20)
F-ACTIN AB, IGG: 7 UNITS (ref 0–19)
GLUCOSE BLD-MCNC: 114 MG/DL (ref 70–99)
GLUCOSE BLD-MCNC: 117 MG/DL (ref 70–99)
GLUCOSE BLD-MCNC: 131 MG/DL (ref 70–99)
HAV AB SERPL IA-ACNC: POSITIVE
MS ALPHA-FETOPROTEIN: 5 NG/ML (ref 0–9)

## 2020-05-27 PROCEDURE — 97530 THERAPEUTIC ACTIVITIES: CPT

## 2020-05-27 PROCEDURE — 97535 SELF CARE MNGMENT TRAINING: CPT

## 2020-05-27 PROCEDURE — 97116 GAIT TRAINING THERAPY: CPT

## 2020-05-27 PROCEDURE — 6370000000 HC RX 637 (ALT 250 FOR IP): Performed by: SPECIALIST

## 2020-05-27 PROCEDURE — 2580000003 HC RX 258: Performed by: SPECIALIST

## 2020-05-27 PROCEDURE — 6370000000 HC RX 637 (ALT 250 FOR IP): Performed by: INTERNAL MEDICINE

## 2020-05-27 PROCEDURE — 82962 GLUCOSE BLOOD TEST: CPT

## 2020-05-27 PROCEDURE — 94761 N-INVAS EAR/PLS OXIMETRY MLT: CPT

## 2020-05-27 RX ORDER — PANTOPRAZOLE SODIUM 40 MG/1
40 TABLET, DELAYED RELEASE ORAL
Qty: 30 TABLET | Refills: 3 | Status: SHIPPED | OUTPATIENT
Start: 2020-05-28 | End: 2021-08-02

## 2020-05-27 RX ORDER — DONEPEZIL HYDROCHLORIDE 5 MG/1
5 TABLET, FILM COATED ORAL NIGHTLY
Status: DISCONTINUED | OUTPATIENT
Start: 2020-05-27 | End: 2020-05-27 | Stop reason: HOSPADM

## 2020-05-27 RX ORDER — DONEPEZIL HYDROCHLORIDE 5 MG/1
5 TABLET, FILM COATED ORAL NIGHTLY
Qty: 30 TABLET | Refills: 2 | Status: SHIPPED | OUTPATIENT
Start: 2020-05-27 | End: 2021-08-02

## 2020-05-27 RX ADMIN — LOSARTAN POTASSIUM 50 MG: 25 TABLET, FILM COATED ORAL at 10:11

## 2020-05-27 RX ADMIN — FERROUS GLUCONATE TAB 324 MG (37.5 MG ELEMENTAL IRON) 324 MG: 324 (37.5 FE) TAB at 10:11

## 2020-05-27 RX ADMIN — CARVEDILOL 25 MG: 25 TABLET, FILM COATED ORAL at 16:41

## 2020-05-27 RX ADMIN — ASPIRIN 81 MG: 81 TABLET, COATED ORAL at 10:11

## 2020-05-27 RX ADMIN — BUSPIRONE HYDROCHLORIDE 15 MG: 15 TABLET ORAL at 16:41

## 2020-05-27 RX ADMIN — SODIUM CHLORIDE, PRESERVATIVE FREE 10 ML: 5 INJECTION INTRAVENOUS at 10:21

## 2020-05-27 RX ADMIN — CARVEDILOL 25 MG: 25 TABLET, FILM COATED ORAL at 10:11

## 2020-05-27 RX ADMIN — PANTOPRAZOLE SODIUM 40 MG: 40 TABLET, DELAYED RELEASE ORAL at 06:13

## 2020-05-27 RX ADMIN — BUSPIRONE HYDROCHLORIDE 15 MG: 15 TABLET ORAL at 10:12

## 2020-05-27 RX ADMIN — SERTRALINE HYDROCHLORIDE 25 MG: 50 TABLET ORAL at 10:12

## 2020-05-27 ASSESSMENT — PAIN SCALES - WONG BAKER
WONGBAKER_NUMERICALRESPONSE: 0

## 2020-05-27 ASSESSMENT — PAIN SCALES - GENERAL
PAINLEVEL_OUTOF10: 0
PAINLEVEL_OUTOF10: 0
PAINLEVEL_OUTOF10: 3
PAINLEVEL_OUTOF10: 0

## 2020-05-27 NOTE — PROGRESS NOTES
[facility] Liaison aware of discharge & will initiate Alberto 78. Spoke w/daughter Henok Le who is in agreement with [facility] and wants to be called for appts. [facility] notified of request. [id number] talked with Madeline Gordon at Dr. Bertin Hampton office r/t c order. Madeline Gordon approved sn, pt, ot & st as pt is dc'd.  Notified [facility] of Dr. Oneida Carranza approval.

## 2020-05-27 NOTE — DISCHARGE SUMMARY
Discharge Summary    Name:  Navjot Heaton /Age/Sex: 1940  (78 y.o. female)   MRN & CSN:  [id number] & [id number] Admission Date/Time: 2020  1:54 PM   Attending:  Nilesh Delgadillo MD Discharging Physician: Nilesh Delgadillo MD     Hospital Course:   Navjot Heaton is a 78 y.o.  female who presented to the hospital with complaint of dysphagia. HOSPITAL COURSE:    #. Oropharyngeal dysphagia. S/p EGD on 20. EGD showed low-grade esophageal ring at the GE J which was fully dilated with TTS balloon dilator 18 to 20 mm. Appetite is poor. I talked to her daughter, Moise Page who stated that she will be taking care of the patient along with the help of her bladder. Each of them live close to the patient. She was discharged home with Protonix 40 mg daily.     #. UTI-- urine culture growing Klebsiella pneumonia. Sensitive to ceftriaxone. She was treated with 5 days of ceftriaxone.     #. Severe aortic stenosis-- underwent left heart cath on 20. No CAD noted. Outpatient follow-up with cardiology for further discussion of TAVR.     #. Compensated cirrhosis-- viral hepatitis negative. Follow-up with GI on outpatient basis. First dose of hep A hep B vaccination series administered in the hospital.  I discussed with her daughter around that she needs second dose of the third dose.     #.  Pancytopenia-- in the setting of cirrhosis. Platelet 94. No mucosal bleeding.     #. Hypertension-- on losartan and Coreg     #. Hyperlipidemia--on Lipitor    #. Moderate protein calorie malnutrition--weight 127 pounds, BMI 22     #. Depression/anxiety/paranoid schizophrenia--on BuSpar, Lexapro, trazodone. Psychiatry evaluated the patient. Psychiatry recommended discontinuation of Lexapro and initiation of Zoloft. They also recommended titrating BuSpar to 30 mg 3 times daily. I discussed this with the patient's daughter, Moise Page.   Moise Page stated that the patient has a psychiatrist and she wanted him

## 2020-05-27 NOTE — PROGRESS NOTES
Occupational Therapy  . Occupational Therapy Treatment Note  Name: Solitario Becerra MRN: [id number] :   1940   Date:  2020   Admission Date: 2020 Room:  1123/1123-A   Restrictions/Precautions:    General precautions; Fall Risk    Communication with other providers:  COURTNEY [facility] cleared pt for OT Tx session. Notified Nurse [facility] of pt's report of lightheadedness following ADLs. RN [facility] notified that lightheadedness has not resolved and Charge Nurse Rachel and COURTNEY [facility] both arrived to provide patient assessment prior to this therapist departing. Subjective:  Patient states:  Pt agreeable to OT Tx session. Pt reported lightheadedness s/p toileting and grooming ADLs and pt's vitals were checked once pt returned to bedside chair. See below. Pain:   Location, Type, Intensity (0/10 to 10/10):  0/10, pain. Objective:    Observation:  Pt received seated in bedside chair. Objective Measures:  Telemetry, HR 59. Recheck s/p pt reported lightheadedness s/p performing ADLs was HR 56, /73, RR 15. Recheck showed /62, RR 14. Pt continues to report dizziness and nursing staff notified and arrived to assess patient. Treatment, including education:  Therapeutic Activity Training:   Therapeutic activity training was instructed today. Cues were given for safety, sequence, UE/LE placement, awareness, and balance. Activities performed today included sit<>stands and stand step transfers. Sit to stand: CGA c RW  Stand to sit: CGA c RW  Stand Step Transfer: CGA c RW  Pt required occasional vc's for safe body positioning and keeping walker close. Standing balance / tolerance: Intermittent CGA / SBA in stance at sink x5 minutes. Functional Mobility: CGA c RW to perform functional mobility ~25 ft inside room. Self Care Training:   Cues were given for safety, sequence, UE/LE placement, visual cues, and balance. Activities performed today included toileting, hand hygiene, and grooming.

## 2020-05-27 NOTE — PROGRESS NOTES
GI REVIEW:  1) compensated cirrhosis- etiology not clear- possibly MILTON, passive congestion from AS  2) dysphagia- mild bere-pharyngeal dysphagia according to SLP eval-- also low-grade esophageal ring- S/P dilation  3) no Hep B immunity, Hep A Ab pending  Suggest:   1) Hep B vaccine #1 now-- needs #2 in 1 month and #3 in 6-12 months   2) Hep A vaccine if Hep A Ab neg   3) follow up with me in 3 months

## 2020-05-29 LAB
EKG ATRIAL RATE: 75 BPM
EKG DIAGNOSIS: NORMAL
EKG P AXIS: 38 DEGREES
EKG P-R INTERVAL: 152 MS
EKG Q-T INTERVAL: 414 MS
EKG QRS DURATION: 92 MS
EKG QTC CALCULATION (BAZETT): 462 MS
EKG R AXIS: -16 DEGREES
EKG T AXIS: 37 DEGREES
EKG VENTRICULAR RATE: 75 BPM

## 2020-06-01 LAB
C282Y HEMOCHROMATOSIS MUT: NORMAL
H63D HEMOCHROMATOSIS MUT: NEGATIVE
HEMOCHROMATOSIS MUTATION C282Y/H63D: NORMAL
HFE PCR SPECIMEN: NORMAL
S65C HEMOCHROMATOSIS MUT: NEGATIVE

## 2020-09-17 NOTE — ANESTHESIA PRE PROCEDURE
Intravenous PRN Parviz Redmond MD        acetaminophen (TYLENOL) tablet 650 mg  650 mg Oral Q6H PRN Parviz Redmond MD   650 mg at 05/25/20 1453    Or    acetaminophen (TYLENOL) suppository 650 mg  650 mg Rectal Q6H PRN Parviz Redmond MD        polyethylene glycol [facility]) packet 17 g  17 g Oral Daily PRN Parviz Redmond MD        promethazine [facility]) tablet 12.5 mg  12.5 mg Oral Q6H PRN Pavriz Redmond MD   12.5 mg at 05/24/20 2105    Or    ondansetron (ZOFRAN) injection 4 mg  4 mg Intravenous Q6H PRN Parviz Redmond MD        nitroGLYCERIN (NITROSTAT) SL tablet 0.4 mg  0.4 mg Sublingual Q5 Min PRN Parviz Redmond MD        aspirin EC tablet 81 mg  81 mg Oral Daily Parviz Redmond MD   81 mg at 05/25/20 1038    busPIRone (BUSPAR) tablet 15 mg  15 mg Oral TID Parviz Redmond MD   15 mg at 05/25/20 2224    carvedilol (COREG) tablet 25 mg  25 mg Oral BID  Parviz Redmond MD   25 mg at 05/24/20 1807    escitalopram (LEXAPRO) tablet 20 mg  20 mg Oral Daily Parviz Redmond MD   20 mg at 05/25/20 1038    losartan (COZAAR) tablet 50 mg  50 mg Oral Daily Parviz Redmond MD   50 mg at 05/25/20 1038    pantoprazole (PROTONIX) tablet 40 mg  40 mg Oral QAM AC Parviz Redmond MD   40 mg at 05/25/20 0508    insulin lispro (HUMALOG) injection vial 0-12 Units  0-12 Units Subcutaneous TID  Parviz Redmond MD        insulin lispro (HUMALOG) injection vial 0-6 Units  0-6 Units Subcutaneous Nightly Parviz Redmond MD        atorvastatin (LIPITOR) tablet 20 mg  20 mg Oral Daily Parviz Redmond MD   20 mg at 05/25/20 2224    traZODone (DESYREL) tablet 50 mg  50 mg Oral Nightly Parviz Redmond MD   50 mg at 05/25/20 2225    ferrous gluconate 324 (37.5 Fe) MG tablet 324 mg  324 mg Oral BID Parviz Redmond MD   324 mg at 05/25/20 2224    glucose (GLUTOSE) 40 % oral gel 15 g  15 g Oral PRN Norm Gill MD        dextrose 50 % IV solution  12.5 g Intravenous PRN Norm 05/25/20 1441 05/25/20 2230 05/26/20 0419 05/26/20 0623   BP:  133/69 (!) 185/91 (!) 174/79   Pulse:  63 78 80   Resp:  16 16 16   Temp: 36.7 °C (98.1 °F) 36.7 °C (98 °F) 36.3 °C (97.3 °F) 36.4 °C (97.6 °F)   TempSrc: Oral Oral Oral Oral   SpO2:  96% 98% 96%   Weight:   130 lb 11.2 oz (59.3 kg)    Height:                                                  BP Readings from Last 3 Encounters:   05/26/20 (!) 174/79   02/27/20 (!) 140/63   10/25/19 136/73       NPO Status: Time of last liquid consumption: 2359                        Time of last solid consumption: 2359                        Date of last liquid consumption: 05/25/20                        Date of last solid food consumption: 05/25/20    BMI:   Wt Readings from Last 3 Encounters:   05/26/20 130 lb 11.2 oz (59.3 kg)   02/27/20 140 lb 9.6 oz (63.8 kg)   10/25/19 160 lb (72.6 kg)     Body mass index is 22.43 kg/m².     CBC:   Lab Results   Component Value Date    WBC 2.4 05/25/2020    RBC 3.76 05/25/2020    HGB 11.7 05/25/2020    HCT 36.0 05/25/2020    MCV 95.7 05/25/2020    RDW 14.6 05/25/2020     05/25/2020       CMP:   Lab Results   Component Value Date     05/25/2020    K 4.0 05/25/2020     05/25/2020    CO2 25 05/25/2020    BUN 9 05/25/2020    CREATININE 0.9 05/25/2020    GFRAA >60 05/25/2020    LABGLOM 60 05/25/2020    GLUCOSE 172 05/25/2020    PROT 6.7 05/23/2020    CALCIUM 8.7 05/25/2020    BILITOT 1.2 05/23/2020    ALKPHOS 150 05/23/2020    AST 34 05/23/2020    ALT 20 05/23/2020       POC Tests:   Recent Labs     05/25/20 2113   POCGLU 130*       Coags:   Lab Results   Component Value Date    PROTIME 13.0 02/22/2020    INR 1.07 02/22/2020    APTT 37.7 02/22/2020       HCG (If Applicable): No results found for: PREGTESTUR, PREGSERUM, HCG, HCGQUANT     ABGs:   Lab Results   Component Value Date    PO2ART 71 02/26/2020    NXP7WEB 38.0 02/26/2020    CBT1OCO 27.7 02/26/2020        Type & Screen (If Applicable):  No results found for: Pt will state 3 low fiber foods and 3 high fiber foods

## 2021-08-02 ENCOUNTER — APPOINTMENT (OUTPATIENT)
Dept: CT IMAGING | Age: 81
DRG: 378 | End: 2021-08-02
Payer: MEDICARE

## 2021-08-02 ENCOUNTER — HOSPITAL ENCOUNTER (INPATIENT)
Age: 81
LOS: 5 days | Discharge: NURSING, REHAB OR LONG-TERM CARE | DRG: 378 | End: 2021-08-07
Attending: EMERGENCY MEDICINE | Admitting: HOSPITALIST
Payer: MEDICARE

## 2021-08-02 ENCOUNTER — APPOINTMENT (OUTPATIENT)
Dept: GENERAL RADIOLOGY | Age: 81
DRG: 378 | End: 2021-08-02
Payer: MEDICARE

## 2021-08-02 ENCOUNTER — ANESTHESIA EVENT (OUTPATIENT)
Dept: ENDOSCOPY | Age: 81
DRG: 378 | End: 2021-08-02
Payer: MEDICARE

## 2021-08-02 DIAGNOSIS — K92.2 GASTROINTESTINAL HEMORRHAGE, UNSPECIFIED GASTROINTESTINAL HEMORRHAGE TYPE: ICD-10-CM

## 2021-08-02 DIAGNOSIS — F41.9 ANXIETY: ICD-10-CM

## 2021-08-02 DIAGNOSIS — M54.50 LOW BACK PAIN, UNSPECIFIED BACK PAIN LATERALITY, UNSPECIFIED CHRONICITY, UNSPECIFIED WHETHER SCIATICA PRESENT: ICD-10-CM

## 2021-08-02 DIAGNOSIS — L98.421 SKIN ULCER OF SACRUM, LIMITED TO BREAKDOWN OF SKIN (HCC): ICD-10-CM
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
## 2021-08-02 DIAGNOSIS — S80.12XA CONTUSION OF LEFT LOWER EXTREMITY, INITIAL ENCOUNTER: ICD-10-CM

## 2021-08-02 LAB
ALBUMIN SERPL-MCNC: 3.7 GM/DL (ref 3.4–5)
ALP BLD-CCNC: 134 IU/L (ref 40–129)
ALT SERPL-CCNC: 19 U/L (ref 10–40)
ANION GAP SERPL CALCULATED.3IONS-SCNC: 10 MMOL/L (ref 4–16)
APTT: 27 SECONDS (ref 25.1–37.1)
AST SERPL-CCNC: 35 IU/L (ref 15–37)
BACTERIA: NEGATIVE /HPF
BASOPHILS ABSOLUTE: 0 K/CU MM
BASOPHILS RELATIVE PERCENT: 0.2 % (ref 0–1)
BILIRUB SERPL-MCNC: 0.8 MG/DL (ref 0–1)
BILIRUBIN URINE: NEGATIVE MG/DL
BLOOD, URINE: NEGATIVE
BUN BLDV-MCNC: 28 MG/DL (ref 6–23)
CALCIUM SERPL-MCNC: 8.8 MG/DL (ref 8.3–10.6)
CHLORIDE BLD-SCNC: 100 MMOL/L (ref 99–110)
CLARITY: CLEAR
CO2: 25 MMOL/L (ref 21–32)
COLOR: YELLOW
CREAT SERPL-MCNC: 0.6 MG/DL (ref 0.6–1.1)
DIFFERENTIAL TYPE: ABNORMAL
EKG ATRIAL RATE: 115 BPM
EKG DIAGNOSIS: NORMAL
EKG P AXIS: 13 DEGREES
EKG P-R INTERVAL: 146 MS
EKG Q-T INTERVAL: 366 MS
EKG QRS DURATION: 94 MS
EKG QTC CALCULATION (BAZETT): 506 MS
EKG R AXIS: 0 DEGREES
EKG T AXIS: 68 DEGREES
EKG VENTRICULAR RATE: 115 BPM
EOSINOPHILS ABSOLUTE: 0 K/CU MM
EOSINOPHILS RELATIVE PERCENT: 0.1 % (ref 0–3)
ESTIMATED AVERAGE GLUCOSE: 169 MG/DL
GFR AFRICAN AMERICAN: >60 ML/MIN/1.73M2
GFR NON-AFRICAN AMERICAN: >60 ML/MIN/1.73M2
GLUCOSE BLD-MCNC: 188 MG/DL (ref 70–99)
GLUCOSE BLD-MCNC: 199 MG/DL (ref 70–99)
GLUCOSE BLD-MCNC: 235 MG/DL (ref 70–99)
GLUCOSE, URINE: NEGATIVE MG/DL
HBA1C MFR BLD: 7.5 % (ref 4.2–6.3)
HCT VFR BLD CALC: 22 % (ref 37–47)
HCT VFR BLD CALC: 26.6 % (ref 37–47)
HEMOGLOBIN: 6.6 GM/DL (ref 12.5–16)
HEMOGLOBIN: 8 GM/DL (ref 12.5–16)
IMMATURE NEUTROPHIL %: 0.7 % (ref 0–0.43)
INR BLD: 1.08 INDEX
KETONES, URINE: NEGATIVE MG/DL
LEUKOCYTE ESTERASE, URINE: NEGATIVE
LIPASE: 7 IU/L (ref 13–60)
LYMPHOCYTES ABSOLUTE: 1.8 K/CU MM
LYMPHOCYTES RELATIVE PERCENT: 12.1 % (ref 24–44)
MAGNESIUM: 2.2 MG/DL (ref 1.8–2.4)
MCH RBC QN AUTO: 26.4 PG (ref 27–31)
MCHC RBC AUTO-ENTMCNC: 30 % (ref 32–36)
MCV RBC AUTO: 88 FL (ref 78–100)
MONOCYTES ABSOLUTE: 1.6 K/CU MM
MONOCYTES RELATIVE PERCENT: 10.8 % (ref 0–4)
NITRITE URINE, QUANTITATIVE: NEGATIVE
NUCLEATED RBC %: 0 %
PDW BLD-RTO: 16.7 % (ref 11.7–14.9)
PH, URINE: 5 (ref 5–8)
PLATELET # BLD: 298 K/CU MM (ref 140–440)
PMV BLD AUTO: 9.8 FL (ref 7.5–11.1)
POTASSIUM SERPL-SCNC: 4.5 MMOL/L (ref 3.5–5.1)
PRO-BNP: 197.5 PG/ML
PROTEIN UA: NEGATIVE MG/DL
PROTHROMBIN TIME: 13.9 SECONDS (ref 11.7–14.5)
RBC # BLD: 2.5 M/CU MM (ref 4.2–5.4)
RBC URINE: 1 /HPF (ref 0–6)
SARS-COV-2, NAAT: NOT DETECTED
SEGMENTED NEUTROPHILS ABSOLUTE COUNT: 11.3 K/CU MM
SEGMENTED NEUTROPHILS RELATIVE PERCENT: 76.1 % (ref 36–66)
SODIUM BLD-SCNC: 135 MMOL/L (ref 135–145)
SOURCE: NORMAL
SPECIFIC GRAVITY UA: 1.01 (ref 1–1.03)
SQUAMOUS EPITHELIAL: <1 /HPF
TOTAL CK: 233 IU/L (ref 26–140)
TOTAL IMMATURE NEUTOROPHIL: 0.1 K/CU MM
TOTAL NUCLEATED RBC: 0 K/CU MM
TOTAL PROTEIN: 5.7 GM/DL (ref 6.4–8.2)
TRICHOMONAS: NORMAL /HPF
TROPONIN T: <0.01 NG/ML
UROBILINOGEN, URINE: NEGATIVE MG/DL (ref 0.2–1)
WBC # BLD: 14.8 K/CU MM (ref 4–10.5)
WBC UA: 2 /HPF (ref 0–5)

## 2021-08-02 PROCEDURE — 83690 ASSAY OF LIPASE: CPT

## 2021-08-02 PROCEDURE — 96374 THER/PROPH/DIAG INJ IV PUSH: CPT

## 2021-08-02 PROCEDURE — 6360000004 HC RX CONTRAST MEDICATION: Performed by: EMERGENCY MEDICINE

## 2021-08-02 PROCEDURE — 74177 CT ABD & PELVIS W/CONTRAST: CPT

## 2021-08-02 PROCEDURE — 6370000000 HC RX 637 (ALT 250 FOR IP): Performed by: HOSPITALIST

## 2021-08-02 PROCEDURE — 84484 ASSAY OF TROPONIN QUANT: CPT

## 2021-08-02 PROCEDURE — P9016 RBC LEUKOCYTES REDUCED: HCPCS

## 2021-08-02 PROCEDURE — 2580000003 HC RX 258: Performed by: PHYSICIAN ASSISTANT

## 2021-08-02 PROCEDURE — C9113 INJ PANTOPRAZOLE SODIUM, VIA: HCPCS | Performed by: HOSPITALIST

## 2021-08-02 PROCEDURE — 36430 TRANSFUSION BLD/BLD COMPNT: CPT

## 2021-08-02 PROCEDURE — 73590 X-RAY EXAM OF LOWER LEG: CPT

## 2021-08-02 PROCEDURE — 6360000002 HC RX W HCPCS: Performed by: PHYSICIAN ASSISTANT

## 2021-08-02 PROCEDURE — 94761 N-INVAS EAR/PLS OXIMETRY MLT: CPT

## 2021-08-02 PROCEDURE — 86850 RBC ANTIBODY SCREEN: CPT

## 2021-08-02 PROCEDURE — 85014 HEMATOCRIT: CPT

## 2021-08-02 PROCEDURE — 71045 X-RAY EXAM CHEST 1 VIEW: CPT

## 2021-08-02 PROCEDURE — 36415 COLL VENOUS BLD VENIPUNCTURE: CPT

## 2021-08-02 PROCEDURE — 72131 CT LUMBAR SPINE W/O DYE: CPT

## 2021-08-02 PROCEDURE — 72128 CT CHEST SPINE W/O DYE: CPT

## 2021-08-02 PROCEDURE — 72125 CT NECK SPINE W/O DYE: CPT

## 2021-08-02 PROCEDURE — 86900 BLOOD TYPING SEROLOGIC ABO: CPT

## 2021-08-02 PROCEDURE — 99285 EMERGENCY DEPT VISIT HI MDM: CPT

## 2021-08-02 PROCEDURE — 2140000000 HC CCU INTERMEDIATE R&B

## 2021-08-02 PROCEDURE — 80053 COMPREHEN METABOLIC PANEL: CPT

## 2021-08-02 PROCEDURE — 87635 SARS-COV-2 COVID-19 AMP PRB: CPT

## 2021-08-02 PROCEDURE — 6360000002 HC RX W HCPCS: Performed by: HOSPITALIST

## 2021-08-02 PROCEDURE — 82550 ASSAY OF CK (CPK): CPT

## 2021-08-02 PROCEDURE — 85025 COMPLETE CBC W/AUTO DIFF WBC: CPT

## 2021-08-02 PROCEDURE — 85610 PROTHROMBIN TIME: CPT

## 2021-08-02 PROCEDURE — 82962 GLUCOSE BLOOD TEST: CPT

## 2021-08-02 PROCEDURE — 96375 TX/PRO/DX INJ NEW DRUG ADDON: CPT

## 2021-08-02 PROCEDURE — 81001 URINALYSIS AUTO W/SCOPE: CPT

## 2021-08-02 PROCEDURE — 83036 HEMOGLOBIN GLYCOSYLATED A1C: CPT

## 2021-08-02 PROCEDURE — 72170 X-RAY EXAM OF PELVIS: CPT

## 2021-08-02 PROCEDURE — 70450 CT HEAD/BRAIN W/O DYE: CPT

## 2021-08-02 PROCEDURE — 83880 ASSAY OF NATRIURETIC PEPTIDE: CPT

## 2021-08-02 PROCEDURE — 83735 ASSAY OF MAGNESIUM: CPT

## 2021-08-02 PROCEDURE — 85018 HEMOGLOBIN: CPT

## 2021-08-02 PROCEDURE — 93010 ELECTROCARDIOGRAM REPORT: CPT | Performed by: INTERNAL MEDICINE

## 2021-08-02 PROCEDURE — 85730 THROMBOPLASTIN TIME PARTIAL: CPT

## 2021-08-02 PROCEDURE — 93005 ELECTROCARDIOGRAM TRACING: CPT | Performed by: PHYSICIAN ASSISTANT

## 2021-08-02 PROCEDURE — 2580000003 HC RX 258: Performed by: HOSPITALIST

## 2021-08-02 PROCEDURE — 86922 COMPATIBILITY TEST ANTIGLOB: CPT

## 2021-08-02 PROCEDURE — 86901 BLOOD TYPING SEROLOGIC RH(D): CPT

## 2021-08-02 PROCEDURE — C9113 INJ PANTOPRAZOLE SODIUM, VIA: HCPCS | Performed by: PHYSICIAN ASSISTANT

## 2021-08-02 RX ORDER — ACETAMINOPHEN 650 MG/1
650 SUPPOSITORY RECTAL EVERY 6 HOURS PRN
Status: DISCONTINUED | OUTPATIENT
Start: 2021-08-02 | End: 2021-08-08 | Stop reason: HOSPADM

## 2021-08-02 RX ORDER — TRAZODONE HYDROCHLORIDE 50 MG/1
50 TABLET ORAL NIGHTLY
Status: DISCONTINUED | OUTPATIENT
Start: 2021-08-02 | End: 2021-08-07

## 2021-08-02 RX ORDER — ONDANSETRON 2 MG/ML
4 INJECTION INTRAMUSCULAR; INTRAVENOUS EVERY 6 HOURS PRN
Status: DISCONTINUED | OUTPATIENT
Start: 2021-08-02 | End: 2021-08-02 | Stop reason: SDUPTHER

## 2021-08-02 RX ORDER — SODIUM CHLORIDE 9 MG/ML
INJECTION, SOLUTION INTRAVENOUS CONTINUOUS
Status: DISCONTINUED | OUTPATIENT
Start: 2021-08-02 | End: 2021-08-04

## 2021-08-02 RX ORDER — 0.9 % SODIUM CHLORIDE 0.9 %
1000 INTRAVENOUS SOLUTION INTRAVENOUS ONCE
Status: COMPLETED | OUTPATIENT
Start: 2021-08-02 | End: 2021-08-02

## 2021-08-02 RX ORDER — FENTANYL CITRATE 50 UG/ML
25 INJECTION, SOLUTION INTRAMUSCULAR; INTRAVENOUS ONCE
Status: COMPLETED | OUTPATIENT
Start: 2021-08-02 | End: 2021-08-02

## 2021-08-02 RX ORDER — SODIUM CHLORIDE 0.9 % (FLUSH) 0.9 %
5-40 SYRINGE (ML) INJECTION EVERY 12 HOURS SCHEDULED
Status: DISCONTINUED | OUTPATIENT
Start: 2021-08-02 | End: 2021-08-08 | Stop reason: HOSPADM

## 2021-08-02 RX ORDER — ALPRAZOLAM 0.25 MG/1
0.5 TABLET ORAL 3 TIMES DAILY
Status: COMPLETED | OUTPATIENT
Start: 2021-08-02 | End: 2021-08-05

## 2021-08-02 RX ORDER — SODIUM CHLORIDE 9 MG/ML
25 INJECTION, SOLUTION INTRAVENOUS PRN
Status: DISCONTINUED | OUTPATIENT
Start: 2021-08-02 | End: 2021-08-08 | Stop reason: HOSPADM

## 2021-08-02 RX ORDER — PROMETHAZINE HYDROCHLORIDE 12.5 MG/1
12.5 TABLET ORAL EVERY 6 HOURS PRN
Status: DISCONTINUED | OUTPATIENT
Start: 2021-08-02 | End: 2021-08-08 | Stop reason: HOSPADM

## 2021-08-02 RX ORDER — DULOXETIN HYDROCHLORIDE 30 MG/1
60 CAPSULE, DELAYED RELEASE ORAL DAILY
Status: DISCONTINUED | OUTPATIENT
Start: 2021-08-02 | End: 2021-08-07

## 2021-08-02 RX ORDER — NICOTINE POLACRILEX 4 MG
15 LOZENGE BUCCAL PRN
Status: DISCONTINUED | OUTPATIENT
Start: 2021-08-02 | End: 2021-08-08 | Stop reason: HOSPADM

## 2021-08-02 RX ORDER — SODIUM CHLORIDE 0.9 % (FLUSH) 0.9 %
5-40 SYRINGE (ML) INJECTION PRN
Status: DISCONTINUED | OUTPATIENT
Start: 2021-08-02 | End: 2021-08-08 | Stop reason: HOSPADM

## 2021-08-02 RX ORDER — DULOXETIN HYDROCHLORIDE 60 MG/1
60 CAPSULE, DELAYED RELEASE ORAL DAILY
COMMUNITY
Start: 2021-07-22

## 2021-08-02 RX ORDER — SODIUM CHLORIDE 0.9 % (FLUSH) 0.9 %
10 SYRINGE (ML) INJECTION
Status: DISCONTINUED | OUTPATIENT
Start: 2021-08-02 | End: 2021-08-08 | Stop reason: HOSPADM

## 2021-08-02 RX ORDER — SODIUM CHLORIDE 9 MG/ML
INJECTION, SOLUTION INTRAVENOUS PRN
Status: COMPLETED | OUTPATIENT
Start: 2021-08-02 | End: 2021-08-04

## 2021-08-02 RX ORDER — PROMETHAZINE HYDROCHLORIDE 25 MG/ML
12.5 INJECTION, SOLUTION INTRAMUSCULAR; INTRAVENOUS EVERY 6 HOURS PRN
Status: DISCONTINUED | OUTPATIENT
Start: 2021-08-02 | End: 2021-08-08 | Stop reason: HOSPADM

## 2021-08-02 RX ORDER — PANTOPRAZOLE SODIUM 40 MG/10ML
40 INJECTION, POWDER, LYOPHILIZED, FOR SOLUTION INTRAVENOUS EVERY 12 HOURS
Status: DISCONTINUED | OUTPATIENT
Start: 2021-08-02 | End: 2021-08-08 | Stop reason: HOSPADM

## 2021-08-02 RX ORDER — ALPRAZOLAM 0.5 MG/1
0.5 TABLET ORAL 2 TIMES DAILY PRN
Status: ON HOLD | COMMUNITY
Start: 2021-07-15 | End: 2021-08-07 | Stop reason: SDUPTHER

## 2021-08-02 RX ORDER — ACETAMINOPHEN 325 MG/1
650 TABLET ORAL EVERY 6 HOURS PRN
Status: DISCONTINUED | OUTPATIENT
Start: 2021-08-02 | End: 2021-08-08 | Stop reason: HOSPADM

## 2021-08-02 RX ORDER — PANTOPRAZOLE SODIUM 40 MG/10ML
80 INJECTION, POWDER, LYOPHILIZED, FOR SOLUTION INTRAVENOUS ONCE
Status: COMPLETED | OUTPATIENT
Start: 2021-08-02 | End: 2021-08-02

## 2021-08-02 RX ORDER — ONDANSETRON 4 MG/1
4 TABLET, ORALLY DISINTEGRATING ORAL EVERY 8 HOURS PRN
Status: DISCONTINUED | OUTPATIENT
Start: 2021-08-02 | End: 2021-08-02 | Stop reason: SDUPTHER

## 2021-08-02 RX ORDER — BUSPIRONE HYDROCHLORIDE 5 MG/1
15 TABLET ORAL 3 TIMES DAILY
Status: DISCONTINUED | OUTPATIENT
Start: 2021-08-02 | End: 2021-08-05

## 2021-08-02 RX ORDER — DEXTROSE MONOHYDRATE 25 G/50ML
12.5 INJECTION, SOLUTION INTRAVENOUS PRN
Status: DISCONTINUED | OUTPATIENT
Start: 2021-08-02 | End: 2021-08-08 | Stop reason: HOSPADM

## 2021-08-02 RX ORDER — FUROSEMIDE 20 MG/1
20 TABLET ORAL DAILY PRN
COMMUNITY
Start: 2021-04-28

## 2021-08-02 RX ORDER — DEXTROSE MONOHYDRATE 50 MG/ML
100 INJECTION, SOLUTION INTRAVENOUS PRN
Status: DISCONTINUED | OUTPATIENT
Start: 2021-08-02 | End: 2021-08-08 | Stop reason: HOSPADM

## 2021-08-02 RX ADMIN — IOPAMIDOL 75 ML: 755 INJECTION, SOLUTION INTRAVENOUS at 15:32

## 2021-08-02 RX ADMIN — SODIUM CHLORIDE: 9 INJECTION, SOLUTION INTRAVENOUS at 15:42

## 2021-08-02 RX ADMIN — DULOXETINE HYDROCHLORIDE 60 MG: 30 CAPSULE, DELAYED RELEASE ORAL at 16:21

## 2021-08-02 RX ADMIN — FENTANYL CITRATE 25 MCG: 50 INJECTION INTRAMUSCULAR; INTRAVENOUS at 13:34

## 2021-08-02 RX ADMIN — BUSPIRONE HYDROCHLORIDE 15 MG: 5 TABLET ORAL at 21:40

## 2021-08-02 RX ADMIN — TRAZODONE HYDROCHLORIDE 50 MG: 50 TABLET ORAL at 21:40

## 2021-08-02 RX ADMIN — SODIUM CHLORIDE, PRESERVATIVE FREE 10 ML: 5 INJECTION INTRAVENOUS at 21:40

## 2021-08-02 RX ADMIN — PANTOPRAZOLE SODIUM 40 MG: 40 INJECTION, POWDER, FOR SOLUTION INTRAVENOUS at 14:30

## 2021-08-02 RX ADMIN — ALPRAZOLAM 0.5 MG: 0.25 TABLET ORAL at 21:39

## 2021-08-02 RX ADMIN — SODIUM CHLORIDE 1000 ML: 9 INJECTION, SOLUTION INTRAVENOUS at 11:45

## 2021-08-02 RX ADMIN — BUSPIRONE HYDROCHLORIDE 15 MG: 5 TABLET ORAL at 16:21

## 2021-08-02 RX ADMIN — ALPRAZOLAM 0.5 MG: 0.25 TABLET ORAL at 16:21

## 2021-08-02 RX ADMIN — PANTOPRAZOLE SODIUM 80 MG: 40 INJECTION, POWDER, FOR SOLUTION INTRAVENOUS at 12:10

## 2021-08-02 ASSESSMENT — PAIN SCALES - GENERAL
PAINLEVEL_OUTOF10: 7
PAINLEVEL_OUTOF10: 0
PAINLEVEL_OUTOF10: 10

## 2021-08-02 ASSESSMENT — PAIN DESCRIPTION - LOCATION: LOCATION: BACK

## 2021-08-02 ASSESSMENT — PAIN DESCRIPTION - PAIN TYPE: TYPE: CHRONIC PAIN

## 2021-08-02 NOTE — ED NOTES
Bed: 02TR-02  Expected date:   Expected time:   Means of arrival:   Comments:  EMS fall     Ciara James RN  08/02/21 2054

## 2021-08-02 NOTE — PROGRESS NOTES
Skin assessment done by this nurse and Manish Baer. Bruising scattered redness under bilateral breast. Redness and purplish coloring on buttock and open area.   Abrasion on left leg and redness on rt knee

## 2021-08-02 NOTE — ED PROVIDER NOTES
([facility] 02/29/2020    seen on CT    Constipation     Depression     Diabetes mellitus ([facility]     Hyperlipidemia     Hypertension     Insomnia     Rosacea     Sinus tachycardia      Past Surgical History:   Procedure Laterality Date    BACK SURGERY      HYSTERECTOMY      TOE SURGERY      UPPER GASTROINTESTINAL ENDOSCOPY N/A 5/26/2020    EGD DILATION BALLOON performed by Mary Ramos MD at [facility]    Allergies   Allergen Reactions    Chocolate Other (See Comments)     Throbbing headache    Nuts [Peanut-Containing Drug Products] Other (See Comments)     \"makes my nose run and like i have a bad cold\"       SOCIAL & FAMILY HISTORY    Social History     Socioeconomic History    Marital status:      Spouse name: None    Number of children: None    Years of education: None    Highest education level: None   Occupational History    None   Tobacco Use    Smoking status: Never Smoker    Smokeless tobacco: Never Used   Vaping Use    Vaping Use: Never used   Substance and Sexual Activity    Alcohol use: No    Drug use: No    Sexual activity: None   Other Topics Concern    None   Social History Narrative    None     Social Determinants of Health     Financial Resource Strain:     Difficulty of Paying Living Expenses:    Food Insecurity:     Worried About Running Out of Food in the Last Year:     Ran Out of Food in the Last Year:    Transportation Needs:     Lack of Transportation (Medical):      Lack of Transportation (Non-Medical):    Physical Activity:     Days of Exercise per Week:     Minutes of Exercise per Session:    Stress:     Feeling of Stress :    Social Connections:     Frequency of Communication with Friends and Family:     Frequency of Social Gatherings with Friends and Family:     Attends Islam Services:     Active Member of Clubs or Organizations:     Attends Club or Organization Meetings:     Marital Status: Intimate Partner Violence:     Fear of Current or Ex-Partner:     Emotionally Abused:     Physically Abused:     Sexually Abused:      No family history on file. PHYSICAL EXAM    VITAL SIGNS: /69   Pulse 124   Temp 98.6 °F (37 °C) (Oral)   Resp 18   Ht 5' 4\" (1.626 m)   Wt 133 lb (60.3 kg)   SpO2 95%   BMI 22.83 kg/m²    Constitutional:  Well developed, well nourished, no acute distress   Eyes: EOMI. PERRL, sclera nonicteric. Anterior chambers clear. Funduscopic exam without any gross abnormality or hemorrhages. HENT:  Atraumatic, no trismus. Ears canals and TMs free of blood or clear fluid. Nasal passages and oropharynx free of blood or clear fluid. No circumferential periorbital ecchymosis or mastoid ecchymosis noted. Neck/Lymphatics: supple, no JVD, no swollen nodes. No posterior neck tenderness. Range of motion without obvious pain or deficit. Respiratory:  Lungs Clear, no retractions   Cardiovascular:  tachycardic rate, no murmurs  GI:  Soft, nontender, normal bowel sounds, no pulsatile mass. Positive bowel sounds. Rectal exam: stool guaiac positive. Evidence of melanotic stool on rectal exam  Musculoskeletal:  No edema, no obvious defect deformity to the bilateral upper extremities. Neurovascularly intact bilaterally in the upper extremities. Does have some bruising noted into the left shin, no obvious joint effusion to the bilateral knee. Again strong pulse, adequate strength in the lower extremities.   Integument: Evidence of a stage I sacral ulcer developing without sign of significant infection to the buttocks area  Neurologic:    - Alert & oriented person, place, time, and situation, no speech difficulties or slurring.  - No obvious gross motor deficits  - Cranial nerves 2-12 grossly intact  - Negative meningeal signs.  - Sensation intact to light touch  - Strength 5/5 in upper and lower extremities bilaterally  - Normal finger to nose test bilaterally  - Rapid Result Value Ref Range    ABO/Rh O POSITIVE     Antibody Screen NEGATIVE     Unit Number [id number]     Component LEUKO-POOR RED CELLS     Unit Divison 00     Status ISSUED     Transfusion Status OK TO TRANSFUSE     Crossmatch Result COMPATIBLE      EKG    See supervising physician's note for EKG interpretation. RADIOLOGY   XR PELVIS (1-2 VIEWS)    Result Date: 8/2/2021  EXAMINATION: ONE XRAY VIEW OF THE PELVIS 8/2/2021 12:19 pm COMPARISON: Pelvis 11/11/2017 HISTORY: ORDERING SYSTEM PROVIDED HISTORY: trauma TECHNOLOGIST PROVIDED HISTORY: Reason for exam:->trauma Reason for Exam: pain Acuity: Acute Type of Exam: Initial Mechanism of Injury: fell FINDINGS: No acute fracture. Osteophyte formation at the hip joints. Soft tissue structures are unremarkable. Degenerative changes in the lower lumbar spine. No acute fracture. XR TIBIA FIBULA LEFT (2 VIEWS)    Result Date: 8/2/2021  EXAMINATION: 2 XRAY VIEWS OF THE LEFT TIBIA AND FIBULA 8/2/2021 12:19 pm COMPARISON: None. HISTORY: ORDERING SYSTEM PROVIDED HISTORY: leg pain TECHNOLOGIST PROVIDED HISTORY: Reason for exam:->leg pain Reason for Exam: abrasion Acuity: Acute Type of Exam: Initial Mechanism of Injury: fell FINDINGS: The tibia and fibula are intact. No acute fracture. Lytic or destructive lesion. Mild degenerative changes in the knee. No acute abnormality. No radiopaque foreign body. CT HEAD WO CONTRAST    Result Date: 8/2/2021  EXAMINATION: CT OF THE HEAD WITHOUT CONTRAST  8/2/2021 11:40 am TECHNIQUE: CT of the head was performed without the administration of intravenous contrast. Dose modulation, iterative reconstruction, and/or weight based adjustment of the mA/kV was utilized to reduce the radiation dose to as low as reasonably achievable. COMPARISON: 05/25/2020 HISTORY: ORDERING SYSTEM PROVIDED HISTORY: fall TECHNOLOGIST PROVIDED HISTORY: Reason for exam:->fall Has a \"code stroke\" or \"stroke alert\" been called? ->No Decision Support Exception - unselect if not a suspected or confirmed emergency medical condition->Emergency Medical Condition (MA) Reason for Exam: fall Acuity: Acute Type of Exam: Initial Mechanism of Injury: fall Relevant Medical/Surgical History: none FINDINGS: BRAIN/VENTRICLES: There is no acute intracranial hemorrhage, mass effect or midline shift. No abnormal extra-axial fluid collection. The gray-white differentiation is maintained without evidence of an acute infarct. There is no evidence of hydrocephalus. ORBITS: The visualized portion of the orbits demonstrate no acute abnormality. SINUSES: The visualized paranasal sinuses and mastoid air cells demonstrate no acute abnormality. SOFT TISSUES/SKULL:  No acute abnormality of the visualized skull or soft tissues. No acute intracranial abnormality. CT CERVICAL SPINE WO CONTRAST    Result Date: 8/2/2021  EXAMINATION: CT OF THE CERVICAL SPINE WITHOUT CONTRAST 8/2/2021 11:39 am TECHNIQUE: CT of the cervical spine was performed without the administration of intravenous contrast. Multiplanar reformatted images are provided for review. Dose modulation, iterative reconstruction, and/or weight based adjustment of the mA/kV was utilized to reduce the radiation dose to as low as reasonably achievable. COMPARISON: None. HISTORY: ORDERING SYSTEM PROVIDED HISTORY: fall TECHNOLOGIST PROVIDED HISTORY: Reason for exam:->fall Decision Support Exception - unselect if not a suspected or confirmed emergency medical condition->Emergency Medical Condition (MA) Reason for Exam: fall Acuity: Acute Type of Exam: Initial Mechanism of Injury: fall Relevant Medical/Surgical History: none FINDINGS: BONES/ALIGNMENT: There is no acute fracture or traumatic malalignment. DEGENERATIVE CHANGES: There is multilevel degenerative disease involving the cervical spine. SOFT TISSUES: There is no prevertebral soft tissue swelling. 1. No acute traumatic abnormality involving the cervical spine. CT THORACIC SPINE WO CONTRAST    Result Date: 8/2/2021  EXAMINATION: CT OF THE THORACIC SPINE WITHOUT CONTRAST; CT OF THE LUMBAR SPINE WITHOUT CONTRAST 8/2/2021 TECHNIQUE: CT of the thoracic spine was performed without the administration of intravenous contrast. Multiplanar reformatted images are provided for review. Dose modulation, iterative reconstruction, and/or weight based adjustment of the mA/kV was utilized to reduce the radiation dose to as low as reasonably achievable.; CT of the lumbar spine was performed without the administration of intravenous contrast. Multiplanar reformatted images are provided for review. Dose modulation, iterative reconstruction, and/or weight based adjustment of the mA/kV was utilized to reduce the radiation dose to as low as reasonably achievable. COMPARISON: 02/21/2020. HISTORY: ORDERING SYSTEM PROVIDED HISTORY: fall TECHNOLOGIST PROVIDED HISTORY: Reason for exam:->fall Reason for Exam: fall, back pain Acuity: Acute Type of Exam: Initial Mechanism of Injury: fall Relevant Medical/Surgical History: none; ORDERING SYSTEM PROVIDED HISTORY: LOWER BACK PAIN TECHNOLOGIST PROVIDED HISTORY: Reason for exam:->fall Decision Support Exception - unselect if not a suspected or confirmed emergency medical condition->Emergency Medical Condition (MA) Reason for Exam: fall, back pain Acuity: Acute Type of Exam: Initial Mechanism of Injury: falll Relevant Medical/Surgical History: none FINDINGS: BONES/ALIGNMENT: There is no acute fracture or traumatic malalignment. There are old healed right rib fractures. There is a superior endplate fracture of L2 which is not changed significantly compared to the prior study. There is slight more narrowing in the central portion of the vertebral body. DEGENERATIVE CHANGES: No significant degenerative changes of the thoracic or lumbar spine. SOFT TISSUES: No paraspinal mass is seen. There is a calcified gallstone within the gallbladder.      1. No acute traumatic abnormality involving the thoracic or lumbar spine. 2. Chronic superior endplate fracture of L2. 3. Cholelithiasis. CT LUMBAR SPINE WO CONTRAST    Result Date: 8/2/2021  EXAMINATION: CT OF THE THORACIC SPINE WITHOUT CONTRAST; CT OF THE LUMBAR SPINE WITHOUT CONTRAST 8/2/2021 TECHNIQUE: CT of the thoracic spine was performed without the administration of intravenous contrast. Multiplanar reformatted images are provided for review. Dose modulation, iterative reconstruction, and/or weight based adjustment of the mA/kV was utilized to reduce the radiation dose to as low as reasonably achievable.; CT of the lumbar spine was performed without the administration of intravenous contrast. Multiplanar reformatted images are provided for review. Dose modulation, iterative reconstruction, and/or weight based adjustment of the mA/kV was utilized to reduce the radiation dose to as low as reasonably achievable. COMPARISON: 02/21/2020. HISTORY: ORDERING SYSTEM PROVIDED HISTORY: fall TECHNOLOGIST PROVIDED HISTORY: Reason for exam:->fall Reason for Exam: fall, back pain Acuity: Acute Type of Exam: Initial Mechanism of Injury: fall Relevant Medical/Surgical History: none; ORDERING SYSTEM PROVIDED HISTORY: LOWER BACK PAIN TECHNOLOGIST PROVIDED HISTORY: Reason for exam:->fall Decision Support Exception - unselect if not a suspected or confirmed emergency medical condition->Emergency Medical Condition (MA) Reason for Exam: fall, back pain Acuity: Acute Type of Exam: Initial Mechanism of Injury: falll Relevant Medical/Surgical History: none FINDINGS: BONES/ALIGNMENT: There is no acute fracture or traumatic malalignment. There are old healed right rib fractures. There is a superior endplate fracture of L2 which is not changed significantly compared to the prior study. There is slight more narrowing in the central portion of the vertebral body.  DEGENERATIVE CHANGES: No significant degenerative changes of the thoracic or lumbar spine. SOFT TISSUES: No paraspinal mass is seen. There is a calcified gallstone within the gallbladder. 1. No acute traumatic abnormality involving the thoracic or lumbar spine. 2. Chronic superior endplate fracture of L2. 3. Cholelithiasis. CT ABDOMEN PELVIS W IV CONTRAST Additional Contrast? None    Result Date: 8/2/2021  EXAMINATION: CT OF THE ABDOMEN AND PELVIS WITH CONTRAST 8/2/2021 3:31 pm TECHNIQUE: CT of the abdomen and pelvis was performed with the administration of intravenous contrast. Multiplanar reformatted images are provided for review. Dose modulation, iterative reconstruction, and/or weight based adjustment of the mA/kV was utilized to reduce the radiation dose to as low as reasonably achievable. COMPARISON: 02/21/2020 HISTORY: ORDERING SYSTEM PROVIDED HISTORY: fall/ acute anemia. r/o any occult bld loss TECHNOLOGIST PROVIDED HISTORY: Reason for exam:->fall/ acute anemia. r/o any occult bld loss Additional Contrast?->None Decision Support Exception - unselect if not a suspected or confirmed emergency medical condition->Emergency Medical Condition (MA) Reason for Exam: fall/ acute anemia. r/o any occult bld loss Acuity: Acute Type of Exam: Initial Relevant Medical/Surgical History: HX BACK SURG, HYSTER FINDINGS: Lower Chest: Visualized portions of the lower thorax are unremarkable. Organs: Cirrhosis. Spleen, adrenal glands, and kidneys within normal limits. Atrophic pancreas. Somewhat distended gallbladder with couple radiodense gallstones. GI/Bowel: No bowel obstruction. Normal caliber appendix. Pelvis: Urinary bladder unremarkable. Normal sized prostate gland. Peritoneum/Retroperitoneum: No free air or lymphadenopathy. Trace upper abdominal ascites. Esophageal varices are seen. Bones/Soft Tissues: No acute osseous abnormality. Chronic appearing superior endplate deformity of L2. No CT evidence of acute intra-abdominal process. Cirrhosis and esophageal varices. chest x-ray and pelvic x-ray were also negative, cardiac testing within normal limits. She was initiated on a large bolus of Protonix, she will be given fluid, will be given 1 unit of packed red blood cells. We did consult gastroenterology who will follow the case, she had seen Dr. Morenita Romeo in the past.  Otherwise she has remained stable she did remain stable and her tachycardia improved. Will admit to the hospitalist team    CRITICAL CARE NOTE:  There was a high probability of clinically significant life-threatening deterioration of the patient's condition requiring my urgent intervention due to acute upper GI bleed. Fluid resuscitation, transfusion of packed red blood cells,, infusion of Protonix, specialty consultation, frequent reevaluations was performed to address this. Total critical care time is  30 minutes. This includes vital sign monitoring, pulse oximetry monitoring, telemetry monitoring, clinical response to the IV medications, reviewing the nursing notes, consultation time, dictation/documentation time, and interpretation of the lab work. This time excludes time spent performing procedures and separately billable procedures and family discussion time. All pertinent Lab data and radiographic results reviewed with patient at bedside. The patient and/or the family were informed of the results of any tests/labs/imaging, the treatment plan, and time was allotted to answer questions. Clinical  IMPRESSION    1. Fall, initial encounter    2. Gastrointestinal hemorrhage, unspecified gastrointestinal hemorrhage type    3. Low back pain, unspecified back pain laterality, unspecified chronicity, unspecified whether sciatica present    4. Contusion of left lower extremity, initial encounter    5.  Skin ulcer of sacrum, limited to breakdown of skin ([facility]      Comment: Please note this report has been produced using speech recognition software and may contain errors related to that system including errors in grammar, punctuation, and spelling, as well as words and phrases that may be inappropriate. If there are any questions or concerns please feel free to contact the dictating provider for clarification.      Glenn Urena, PA  08/02/21 1733

## 2021-08-02 NOTE — Clinical Note
Patient Class: Inpatient [101]   REQUIRED: Diagnosis: GI bleed [[id number]]   Estimated Length of Stay: Estimated stay of more than 2 midnights   Telemetry/Cardiac Monitoring Required?: Yes

## 2021-08-02 NOTE — H&P
Department of Internal Medicine  Hosptialist  Attending History and Physical      CHIEF COMPLAINT:  fall    Reason for Admission:  anemia    History Obtained From:  patient    HISTORY OF PRESENT ILLNESS:      The patient is a 78 y.o. female with significant past medical history of DM, HTN presents as she had a fall yesterday while going to the bathroom and hit a chair and fell on her back. Did not hit her head. She was on the floor and scooted her self to a chair. No abd pain but did have left leg pain. UPon arrival she had anemia and tachycardic. She did state for past couple of day she had \"normal\" color red stools. Past Medical History:        Diagnosis Date    Anxiety     Cellulitis     foot.  Cirrhosis ([facility] 02/27/2020    seen on CT scan    Closed compression fracture of L2 lumbar vertebra, initial encounter ([facility] 02/29/2020    seen on CT    Constipation     Depression     Diabetes mellitus ([facility]     Hyperlipidemia     Hypertension     Insomnia     Rosacea     Sinus tachycardia      Past Surgical History:        Procedure Laterality Date    BACK SURGERY      HYSTERECTOMY      TOE SURGERY      UPPER GASTROINTESTINAL ENDOSCOPY N/A 5/26/2020    EGD DILATION BALLOON performed by Xavi Rivera MD at [address] Prior to Admission:      No current facility-administered medications on file prior to encounter. Current Outpatient Medications on File Prior to Encounter   Medication Sig Dispense Refill    ALPRAZolam (XANAX) 0.5 MG tablet Take 0.5 mg by mouth 2 times daily as needed.       DULoxetine (CYMBALTA) 60 MG extended release capsule Take 60 mg by mouth daily      furosemide (LASIX) 20 MG tablet Take 20 mg by mouth daily as needed      metFORMIN (GLUCOPHAGE) 500 MG tablet Take 500 mg by mouth 2 times daily      busPIRone (BUSPAR) 15 MG tablet Take 15 mg by mouth 3 times daily (Patient taking differently: Take 15 mg by mouth 2 times daily ) 90 tablet 0    traZODone (DESYREL) 50 MG tablet Take 1 tablet by mouth nightly 30 tablet 0    aspirin 81 MG EC tablet Take 81 mg by mouth daily           Allergies:  Chocolate and Nuts [peanut-containing drug products]    Social History:   Social History     Socioeconomic History    Marital status:      Spouse name: Not on file    Number of children: Not on file    Years of education: Not on file    Highest education level: Not on file   Occupational History    Not on file   Tobacco Use    Smoking status: Never Smoker    Smokeless tobacco: Never Used   Vaping Use    Vaping Use: Never used   Substance and Sexual Activity    Alcohol use: No    Drug use: No    Sexual activity: Not on file   Other Topics Concern    Not on file   Social History Narrative    Not on file     Social Determinants of Health     Financial Resource Strain:     Difficulty of Paying Living Expenses:    Food Insecurity:     Worried About Running Out of Food in the Last Year:     Ran Out of Food in the Last Year:    Transportation Needs:     Lack of Transportation (Medical):  Lack of Transportation (Non-Medical):    Physical Activity:     Days of Exercise per Week:     Minutes of Exercise per Session:    Stress:     Feeling of Stress :    Social Connections:     Frequency of Communication with Friends and Family:     Frequency of Social Gatherings with Friends and Family:     Attends Taoism Services:     Active Member of Clubs or Organizations:     Attends Club or Organization Meetings:     Marital Status:    Intimate Partner Violence:     Fear of Current or Ex-Partner:     Emotionally Abused:     Physically Abused:     Sexually Abused:          Family History:   No family history on file. Denies any GI maligancy or other GI disorders.    REVIEW OF SYSTEMS:  Review of Systems - General ROS: negative  ENT ROS: negative  Respiratory ROS: no cough, shortness of breath, or wheezing  Cardiovascular ROS: no chest pain or dyspnea on

## 2021-08-02 NOTE — ED PROVIDER NOTES
This EKG was interpreted by me. Rate is 115, rhythm is sinus with PACs. AK and QT intervals are within normal limits. QTC is 506. There is no ST segment or T wave changes.  This EKG was compared to previous EKG from date 5/23/2020     Alban Nguyen DO  08/02/21 1138

## 2021-08-02 NOTE — ED TRIAGE NOTES
Pt arrives via ems from home, pt fell yesterday at 7 pm, pt was not using her walker when she fell, she denies dizziness. Denies hitting head. Pt is alert and oriented, history of dementia. Pt has redness to bilateral elbows, small skin lac to left shin. Pt complains of being severely thirsty and dehydrated. Pt states she landed on her elbows and shoulders. Pt has chronic back pain that has worsened since her fall yesterday.

## 2021-08-02 NOTE — PROGRESS NOTES
Medication History  [facility]    Patient Name: Mejia Schmid 1940     Medication history has been completed by: Mariah Castleman, CPhT    Source(s) of information: insurance claims and retail pharmacy     Primary Care Physician: Deven Wall MD     Pharmacy: [address] [place] [address] as of 08/02/2021 - Fully Reviewed 08/02/2021   Allergen Reaction Noted    Chocolate Other (See Comments) 04/18/2016    Nuts [peanut-containing drug products] Other (See Comments) 04/18/2016        Prior to Admission medications    Medication Sig Start Date End Date Taking? Authorizing Provider   ALPRAZolam [place]) 0.5 MG tablet Take 0.5 mg by mouth 2 times daily as needed. 7/15/21   Historical Provider, MD   DULoxetine (CYMBALTA) 60 MG extended release capsule Take 60 mg by mouth daily 7/22/21   Historical Provider, MD   furosemide (LASIX) 20 MG tablet Take 20 mg by mouth daily as needed 4/28/21   Historical Provider, MD   metFORMIN (GLUCOPHAGE) 500 MG tablet Take 500 mg by mouth 2 times daily 6/23/21   Historical Provider, MD   busPIRone (BUSPAR) 15 MG tablet Take 15 mg by mouth 3 times daily  Patient taking differently: Take 15 mg by mouth 2 times daily  2/27/20   Mikel Blank MD   traZODone (DESYREL) 50 MG tablet Take 1 tablet by mouth nightly 2/27/20   Mikel Blank MD   aspirin 81 MG EC tablet Take 81 mg by mouth daily    Historical Provider, MD     Medications added or changed (ex. new medication, dosage change, interval change, formulation change):   Alprazolam (added)  Duloxetine (added)  Furosemide (added)  Metformin (added)    Medications removed from list (include reason, ex. noncompliance, medication cost, therapy complete etc.):   Carvedilol last known prescription fill date 09/17/2019 verified with retail pharmacy  Donepezil last known prescription fill date 05/27/2020 verified with retail pharmacy  Escitalopram last known prescription fill date 05/22/2020 verified with retail pharmacy  Ferrous sulfate no history of patient taking this med  Losartan last known prescription fill date 09/17/2019 verified with retail pharmacy  Pantoprazole last known prescription fill date 05/27/2020 verified with retail pharmacy  Simvastatin last known prescription fill date 09/17/2019 verified with retail pharmacy  [place] last known prescription fill date 09/17/2019 verified with retail pharmacy  Aspirin flagged for review unsure if patient taking    Comments:  Patient is poor historian for medications. Medication list per insurance claims and retail pharmacy.     To my knowledge the above medication history is accurate as of 8/2/2021 1:19 PM.   Bre Hogan CPhT   8/2/2021 1:19 PM

## 2021-08-02 NOTE — CARE COORDINATION
CM consult per Dr Ladonna Landaverde to initiate discharge planning. Review of pt chart, Pt is from home, fell yesterday, laid on the floor for hours, HH RN found pt and called [facility]. Spoke with Dr Ladonna Landaverde about consult, pt has acute findings for admission, pt will not be discharged today. CM visited pt, RN at bed side with pt, CM spoke with pt who has some expressive aphasia, and was having difficulty getting out the words she wanted to say when telling me where she lives. She tells me that her dtr, Yinka Oseguera is her POA (paperwork not in pt chart) # [phone], they due live close to pt and check on her regularly, and has [facility]( not sure who). CM discussed with pt discharge plan of possible SNF rehab prior to returning home. Pt has given this CM permission to contact Dtr/Seema [phone]. CM called Yinka Oseguera, no answer able to leave a message for call back, for discharge plan.  COURTNEY OCONNOR/CINDI

## 2021-08-02 NOTE — ED NOTES
Pt cleaned and changed upon arrival, pt able to turn appropriately in bed without difficulty, pt has a large wound to coccyx purple and open. Pt states she is unsure how she got that but it has been there a long time. Pt normally walks with a walker. Pt alert and oriented and able to explain what happened yesterday. Pt was unable to get to the phone so when her home health nurse arrived and found her on the floor, she called EMS for patient. Pt lana to move all extremities appropriately.       Zaheer Crowley, RN  [phone]

## 2021-08-02 NOTE — ED PROVIDER NOTES
I independently examined and evaluated Mahi Jacobson. In brief, 78yof presents with reported fall at home yesterday at 7pm. Denies dizziness, has h/o dementia. Apparently laid on the fall overnight. Has worsened back pain compared to baseline after fall. States she has a sore on her bottom as well. Denies hitting her head. Denies CP and SOB. No vomiting. No recent illnesses. Says was not using her walker. Denies blood in her stools and h/o GI bleed. Denies dizziness and lightheadeness. .    Focused exam revealed patient laying flat on cot, tachycardic with regular rhythm, nonlabored respirations, abdomen soft and nondistneded, no obvious extremity trauma, moves all limbs to command, very pale. ED course: found to have significant anemia, melanotic stool, GI will be consulted, protonix given. trauma workup done and no obvious fractures. Plan for admission. Total critical care time today provided was 30 minutes. This excludes seperately billable procedure. Critical care time provided for trauma, GI bleed that required close evaluation and/or intervention with concern for patient decompensation. All diagnostic, treatment, and disposition decisions were made by myself in conjunction with the advanced practice provider. For all further details of the patient's emergency department visit, please see the advanced practice provider's documentation. Comment: Please note this report has been produced using speech recognition software and may contain errors related to that system including errors in grammar, punctuation, and spelling, as well as words and phrases that may be inappropriate. If there are any questions or concerns please feel free to contact the dictating provider for clarification.         Mindy Jenkins MD  08/02/21 [id number]

## 2021-08-02 NOTE — ANESTHESIA PRE PROCEDURE
Department of Anesthesiology  Preprocedure Note       Name:  Ede Birmingham   Age:  78 y.o.  :  1940                                          MRN:  [id number]         Date:  2021      Surgeon: Lorrie Elaine):  Mary Ramos MD    Procedure: Procedure(s):  EGD ESOPHAGOGASTRODUODENOSCOPY    Medications prior to admission:   Prior to Admission medications    Medication Sig Start Date End Date Taking? Authorizing Provider   ALPRAZolam Nicole Leyland) 0.5 MG tablet Take 0.5 mg by mouth 2 times daily as needed.  7/15/21   Historical Provider, MD   DULoxetine (CYMBALTA) 60 MG extended release capsule Take 60 mg by mouth daily 21   Historical Provider, MD   furosemide (LASIX) 20 MG tablet Take 20 mg by mouth daily as needed 21   Historical Provider, MD   metFORMIN (GLUCOPHAGE) 500 MG tablet Take 500 mg by mouth 2 times daily 21   Historical Provider, MD   busPIRone (BUSPAR) 15 MG tablet Take 15 mg by mouth 3 times daily  Patient taking differently: Take 15 mg by mouth 2 times daily  20   Michele Campuzano MD   traZODone (DESYREL) 50 MG tablet Take 1 tablet by mouth nightly 20   Michele Campuzano MD   aspirin 81 MG EC tablet Take 81 mg by mouth daily    Historical Provider, MD       Current medications:    Current Facility-Administered Medications   Medication Dose Route Frequency Provider Last Rate Last Admin    0.9 % sodium chloride infusion   Intravenous PRN KENISHA Jean-Baptiste        pantoprazole (PROTONIX) injection 40 mg  40 mg Intravenous Q12H Get Tang MD   40 mg at 21 1430    sodium chloride flush 0.9 % injection 5-40 mL  5-40 mL Intravenous 2 times per day Get Tang MD        sodium chloride flush 0.9 % injection 5-40 mL  5-40 mL Intravenous PRN Get Tang MD        0.9 % sodium chloride infusion  25 mL Intravenous PRN Get Tang MD        acetaminophen (TYLENOL) tablet 650 mg  650 mg Oral Q6H PRN Get Tang MD        Or    acetaminophen (TYLENOL) suppository 650 mg  650 mg Rectal Q6H PRN Karin Naylor MD        glucose (GLUTOSE) 40 % oral gel 15 g  15 g Oral PRN Karin Naylor MD        dextrose 50 % IV solution  12.5 g Intravenous PRN Karin Naylor MD        glucagon (rDNA) injection 1 mg  1 mg Intramuscular PRN Karin Naylor MD        dextrose 5 % solution  100 mL/hr Intravenous PRN Karin Naylor MD        insulin lispro (HUMALOG) injection vial 0-6 Units  0-6 Units Subcutaneous TID WC Karin Naylor MD        insulin lispro (HUMALOG) injection vial 0-3 Units  0-3 Units Subcutaneous Nightly Karin Naylor MD        0.9 % sodium chloride infusion   Intravenous Continuous Karin Naylor  mL/hr at 08/02/21 1542 New Bag at 08/02/21 1542    promethazine (PHENERGAN) tablet 12.5 mg  12.5 mg Oral Q6H PRN Karin Naylor MD        Or    promethazine (PHENERGAN) injection 12.5 mg  12.5 mg Intramuscular Q6H PRN Karin Naylor MD        sodium chloride flush 0.9 % injection 10 mL  10 mL Intravenous ONCE PRN Gaeg Clay MD        ALPRAZolam Aldon Nones) tablet 0.5 mg  0.5 mg Oral TID Karin Naylor MD   0.5 mg at 08/02/21 1621    DULoxetine (CYMBALTA) extended release capsule 60 mg  60 mg Oral Daily Karin Naylor MD   60 mg at 08/02/21 1621    traZODone (DESYREL) tablet 50 mg  50 mg Oral Nightly Karin Naylor MD        busPIRone (BUSPAR) tablet 15 mg  15 mg Oral TID Karin Naylor MD   15 mg at 08/02/21 1621       Allergies:     Allergies   Allergen Reactions    Chocolate Other (See Comments)     Throbbing headache    Nuts [Peanut-Containing Drug Products] Other (See Comments)     \"makes my nose run and like i have a bad cold\"       Problem List:    Patient Active Problem List   Diagnosis Code    Dizziness R42    Difficulty swallowing R13.10    PVC (premature ventricular contraction) I49.3    Diabetes mellitus ([address] E11.9    Hypertension I10    Hyperlipemia E78.5    Moderate episode of recurrent major depressive disorder ([facility] F33.1    Pain of right lower extremity M79.604    Hematoma of right thigh S70.11XA    Primary osteoarthritis of both knees M17.0    Uncontrolled pain R52    Chest pain R07.9    Generalized anxiety disorder F41.1    Psychophysiological insomnia F51.04    Severe aortic stenosis I35.0    Moderate malnutrition (HCC) E44.0    GI bleed K92.2       Past Medical History:        Diagnosis Date    Anxiety     Cellulitis     foot.  [facility] 02[facility]27[facility]    seen on CT scan    Closed compression fracture of L2 lumbar vertebra, initial encounter ([facility] 02/29/2020    seen on CT    Constipation     Depression     Diabetes mellitus ([facility]     Hyperlipidemia     Hypertension     Insomnia     Rosacea     Sinus tachycardia        Past Surgical History:        Procedure Laterality Date    BACK SURGERY      HYSTERECTOMY      TOE SURGERY      UPPER GASTROINTESTINAL ENDOSCOPY N/A 5/26/2020    EGD DILATION BALLOON performed by Justin Mitchell MD at [facility] ENDOSCOPY       Social History:    Social History     Tobacco Use    Smoking status: Never Smoker    Smokeless tobacco: Never Used   Substance Use Topics    Alcohol use: No                                Counseling given: Not Answered      Vital Signs (Current):   Vitals:    08/02/21 1526 08/02/21 1531 08/02/21 1715 08/02/21 1723   BP: (!) 117/102 95/82 130/69    Pulse: 118 96 124    Resp: 19 16 19 18   Temp:   37 °C (98.6 °F)    TempSrc:   Oral    SpO2: 99% 96% 100% 95%   Weight:       Height:                                                  BP Readings from Last 3 Encounters:   08/02/21 130/69   05/27/20 (!) 140/66   05/26/20 (!) 130/53       NPO Status:                                                                                 BMI:   Wt Readings from Last 3 Encounters:   08/02/21 133 lb (60.3 kg)   08/24/20 133 lb (60.3 kg)   05/27/20 127 lb 11.2 oz (57.9 kg)     Body mass index is 22.83 kg/m².     CBC:   Lab Results   Component Value Date    WBC 14.8 08/02/2021    RBC 2.50 08/02/2021    HGB 6.6 08/02/2021 HCT 22.0 08/02/2021    MCV 88.0 08/02/2021    RDW 16.7 08/02/2021     08/02/2021       CMP:   Lab Results   Component Value Date     08/02/2021    K 4.5 08/02/2021     08/02/2021    CO2 25 08/02/2021    BUN 28 08/02/2021    CREATININE 0.6 08/02/2021    GFRAA >60 08/02/2021    LABGLOM >60 08/02/2021    GLUCOSE 235 08/02/2021    PROT 5.7 08/02/2021    CALCIUM 8.8 08/02/2021    BILITOT 0.8 08/02/2021    ALKPHOS 134 08/02/2021    AST 35 08/02/2021    ALT 19 08/02/2021       POC Tests: No results for input(s): POCGLU, POCNA, POCK, POCCL, POCBUN, POCHEMO, POCHCT in the last 72 hours. Coags:   Lab Results   Component Value Date    PROTIME 13.9 08/02/2021    INR 1.08 08/02/2021    APTT 27.0 08/02/2021       HCG (If Applicable): No results found for: PREGTESTUR, PREGSERUM, HCG, HCGQUANT     ABGs:   Lab Results   Component Value Date    PO2ART 71 02/26/2020    AYC3CSG 38.0 02/26/2020    OTH5FHD 27.7 02/26/2020        Type & Screen (If Applicable):  No results found for: LABABO, LABRH    Drug/Infectious Status (If Applicable):  Lab Results   Component Value Date    HEPCAB NON REACTIVE 05/24/2020       COVID-19 Screening (If Applicable):   Lab Results   Component Value Date    COVID19 NOT DETECTED 08/02/2021           Anesthesia Evaluation  Patient summary reviewed and Nursing notes reviewed  Airway: Mallampati: II  TM distance: >3 FB   Neck ROM: full  Mouth opening: > = 3 FB Dental:    (+) edentulous      Pulmonary:Negative Pulmonary ROS breath sounds clear to auscultation                             Cardiovascular:  Exercise tolerance: poor (<4 METS),   (+) hypertension:, valvular problems/murmurs: AS, hyperlipidemia         Beta Blocker:  Not on Beta Blocker      ROS comment: ECHO 2/21/20   Summary   Left ventricular function is normal, EF is estimated at 55-60%. Grade I diastolic dysfunction. Mildly dilated left atrium.    There is severe fibrocalcific sclerosis of the aortic valve with evidence of   severe aortic stenosis with mean gradient across the aortic valve of 45 mmHg   and calculated aortic valve area was 0.94 square centimeters, all suggesting   severe aortic stenosis. Mild aortic regurgitation is noted. Pressure half-time 630 . Mitral annular calcification is present. Mild to Moderate mitral and tricuspid regurgitation is present. No evidence of pericardial effusion. Cath 2/21/20  Conclusions      Procedure Summary   SEVERE AS   NO CAD   NORMAL PAP ON RHC     Neuro/Psych:   (+) depression/anxiety             GI/Hepatic/Renal:   (+) liver disease:,           Endo/Other:    (+) DiabetesType II DM, , blood dyscrasia: anemia:., .                 Abdominal:   (+) obese,           Vascular: Other Findings:           Anesthesia Plan      TIVA, MAC and general     ASA 4     (  )  Induction: intravenous. Anesthetic plan and risks discussed with patient. Plan discussed with CRNA.     Attending anesthesiologist reviewed and agrees with Preprocedure content            WES Hamilton - CRNA   8/2/2021

## 2021-08-03 ENCOUNTER — ANESTHESIA (OUTPATIENT)
Dept: ENDOSCOPY | Age: 81
DRG: 378 | End: 2021-08-03
Payer: MEDICARE

## 2021-08-03 VITALS — OXYGEN SATURATION: 100 % | SYSTOLIC BLOOD PRESSURE: 122 MMHG | DIASTOLIC BLOOD PRESSURE: 72 MMHG

## 2021-08-03 LAB
ALBUMIN SERPL-MCNC: 3.3 GM/DL (ref 3.4–5)
ALP BLD-CCNC: 126 IU/L (ref 40–129)
ALT SERPL-CCNC: 21 U/L (ref 10–40)
ANION GAP SERPL CALCULATED.3IONS-SCNC: 5 MMOL/L (ref 4–16)
AST SERPL-CCNC: 37 IU/L (ref 15–37)
BILIRUB SERPL-MCNC: 1 MG/DL (ref 0–1)
BUN BLDV-MCNC: 22 MG/DL (ref 6–23)
CALCIUM SERPL-MCNC: 8.1 MG/DL (ref 8.3–10.6)
CHLORIDE BLD-SCNC: 103 MMOL/L (ref 99–110)
CO2: 26 MMOL/L (ref 21–32)
CREAT SERPL-MCNC: 0.7 MG/DL (ref 0.6–1.1)
GFR AFRICAN AMERICAN: >60 ML/MIN/1.73M2
GFR NON-AFRICAN AMERICAN: >60 ML/MIN/1.73M2
GLUCOSE BLD-MCNC: 174 MG/DL (ref 70–99)
GLUCOSE BLD-MCNC: 202 MG/DL (ref 70–99)
GLUCOSE BLD-MCNC: 224 MG/DL (ref 70–99)
GLUCOSE BLD-MCNC: 254 MG/DL (ref 70–99)
HCT VFR BLD CALC: 23.7 % (ref 37–47)
HCT VFR BLD CALC: 23.9 % (ref 37–47)
HCT VFR BLD CALC: 24.7 % (ref 37–47)
HCT VFR BLD CALC: 25.2 % (ref 37–47)
HEMOGLOBIN: 7.1 GM/DL (ref 12.5–16)
HEMOGLOBIN: 7.1 GM/DL (ref 12.5–16)
HEMOGLOBIN: 7.3 GM/DL (ref 12.5–16)
HEMOGLOBIN: 7.6 GM/DL (ref 12.5–16)
IRON: 15 UG/DL (ref 37–145)
MCH RBC QN AUTO: 26 PG (ref 27–31)
MCHC RBC AUTO-ENTMCNC: 30 % (ref 32–36)
MCV RBC AUTO: 86.8 FL (ref 78–100)
PCT TRANSFERRIN: 5 % (ref 10–44)
PDW BLD-RTO: 16.4 % (ref 11.7–14.9)
PLATELET # BLD: 232 K/CU MM (ref 140–440)
PMV BLD AUTO: 9.9 FL (ref 7.5–11.1)
POTASSIUM SERPL-SCNC: 4 MMOL/L (ref 3.5–5.1)
RBC # BLD: 2.73 M/CU MM (ref 4.2–5.4)
SODIUM BLD-SCNC: 134 MMOL/L (ref 135–145)
TOTAL IRON BINDING CAPACITY: 289 UG/DL (ref 250–450)
TOTAL PROTEIN: 5.3 GM/DL (ref 6.4–8.2)
UNSATURATED IRON BINDING CAPACITY: 274 UG/DL (ref 110–370)
WBC # BLD: 11.3 K/CU MM (ref 4–10.5)

## 2021-08-03 PROCEDURE — 6360000002 HC RX W HCPCS: Performed by: HOSPITALIST

## 2021-08-03 PROCEDURE — 3700000001 HC ADD 15 MINUTES (ANESTHESIA): Performed by: SPECIALIST

## 2021-08-03 PROCEDURE — 97163 PT EVAL HIGH COMPLEX 45 MIN: CPT

## 2021-08-03 PROCEDURE — 2709999900 HC NON-CHARGEABLE SUPPLY: Performed by: SPECIALIST

## 2021-08-03 PROCEDURE — 2140000000 HC CCU INTERMEDIATE R&B

## 2021-08-03 PROCEDURE — 36415 COLL VENOUS BLD VENIPUNCTURE: CPT

## 2021-08-03 PROCEDURE — 6370000000 HC RX 637 (ALT 250 FOR IP): Performed by: SPECIALIST

## 2021-08-03 PROCEDURE — 82962 GLUCOSE BLOOD TEST: CPT

## 2021-08-03 PROCEDURE — 83540 ASSAY OF IRON: CPT

## 2021-08-03 PROCEDURE — 99223 1ST HOSP IP/OBS HIGH 75: CPT | Performed by: SPECIALIST

## 2021-08-03 PROCEDURE — 85018 HEMOGLOBIN: CPT

## 2021-08-03 PROCEDURE — C9113 INJ PANTOPRAZOLE SODIUM, VIA: HCPCS | Performed by: SPECIALIST

## 2021-08-03 PROCEDURE — 83550 IRON BINDING TEST: CPT

## 2021-08-03 PROCEDURE — 85027 COMPLETE CBC AUTOMATED: CPT

## 2021-08-03 PROCEDURE — 6360000002 HC RX W HCPCS: Performed by: SPECIALIST

## 2021-08-03 PROCEDURE — 97116 GAIT TRAINING THERAPY: CPT

## 2021-08-03 PROCEDURE — 97535 SELF CARE MNGMENT TRAINING: CPT

## 2021-08-03 PROCEDURE — 2500000003 HC RX 250 WO HCPCS

## 2021-08-03 PROCEDURE — 97166 OT EVAL MOD COMPLEX 45 MIN: CPT

## 2021-08-03 PROCEDURE — 85014 HEMATOCRIT: CPT

## 2021-08-03 PROCEDURE — 6360000002 HC RX W HCPCS

## 2021-08-03 PROCEDURE — 2580000003 HC RX 258: Performed by: ANESTHESIOLOGY

## 2021-08-03 PROCEDURE — 3700000000 HC ANESTHESIA ATTENDED CARE: Performed by: SPECIALIST

## 2021-08-03 PROCEDURE — 3609017100 HC EGD: Performed by: SPECIALIST

## 2021-08-03 PROCEDURE — 99211 OFF/OP EST MAY X REQ PHY/QHP: CPT

## 2021-08-03 PROCEDURE — 94761 N-INVAS EAR/PLS OXIMETRY MLT: CPT

## 2021-08-03 PROCEDURE — 2580000003 HC RX 258: Performed by: SPECIALIST

## 2021-08-03 PROCEDURE — 80053 COMPREHEN METABOLIC PANEL: CPT

## 2021-08-03 PROCEDURE — C9113 INJ PANTOPRAZOLE SODIUM, VIA: HCPCS | Performed by: HOSPITALIST

## 2021-08-03 PROCEDURE — 43235 EGD DIAGNOSTIC BRUSH WASH: CPT | Performed by: SPECIALIST

## 2021-08-03 PROCEDURE — 0DJ08ZZ INSPECTION OF UPPER INTESTINAL TRACT, VIA NATURAL OR ARTIFICIAL OPENING ENDOSCOPIC: ICD-10-PCS | Performed by: SPECIALIST

## 2021-08-03 PROCEDURE — 99222 1ST HOSP IP/OBS MODERATE 55: CPT | Performed by: INTERNAL MEDICINE

## 2021-08-03 RX ORDER — SODIUM CHLORIDE, SODIUM LACTATE, POTASSIUM CHLORIDE, CALCIUM CHLORIDE 600; 310; 30; 20 MG/100ML; MG/100ML; MG/100ML; MG/100ML
INJECTION, SOLUTION INTRAVENOUS CONTINUOUS
Status: DISCONTINUED | OUTPATIENT
Start: 2021-08-03 | End: 2021-08-03

## 2021-08-03 RX ORDER — LIDOCAINE HYDROCHLORIDE 20 MG/ML
INJECTION, SOLUTION EPIDURAL; INFILTRATION; INTRACAUDAL; PERINEURAL PRN
Status: DISCONTINUED | OUTPATIENT
Start: 2021-08-03 | End: 2021-08-03 | Stop reason: SDUPTHER

## 2021-08-03 RX ORDER — PROPOFOL 10 MG/ML
INJECTION, EMULSION INTRAVENOUS PRN
Status: DISCONTINUED | OUTPATIENT
Start: 2021-08-03 | End: 2021-08-03 | Stop reason: SDUPTHER

## 2021-08-03 RX ADMIN — SODIUM CHLORIDE, POTASSIUM CHLORIDE, SODIUM LACTATE AND CALCIUM CHLORIDE: 600; 310; 30; 20 INJECTION, SOLUTION INTRAVENOUS at 06:46

## 2021-08-03 RX ADMIN — INSULIN LISPRO 3 UNITS: 100 INJECTION, SOLUTION INTRAVENOUS; SUBCUTANEOUS at 13:09

## 2021-08-03 RX ADMIN — ALPRAZOLAM 0.5 MG: 0.25 TABLET ORAL at 21:10

## 2021-08-03 RX ADMIN — BUSPIRONE HYDROCHLORIDE 15 MG: 5 TABLET ORAL at 21:10

## 2021-08-03 RX ADMIN — LIDOCAINE HYDROCHLORIDE 40 MG: 20 INJECTION, SOLUTION EPIDURAL; INFILTRATION; INTRACAUDAL; PERINEURAL at 07:12

## 2021-08-03 RX ADMIN — ALPRAZOLAM 0.5 MG: 0.25 TABLET ORAL at 10:56

## 2021-08-03 RX ADMIN — PANTOPRAZOLE SODIUM 40 MG: 40 INJECTION, POWDER, FOR SOLUTION INTRAVENOUS at 01:17

## 2021-08-03 RX ADMIN — DULOXETINE HYDROCHLORIDE 60 MG: 30 CAPSULE, DELAYED RELEASE ORAL at 10:56

## 2021-08-03 RX ADMIN — BUSPIRONE HYDROCHLORIDE 15 MG: 5 TABLET ORAL at 15:34

## 2021-08-03 RX ADMIN — INSULIN LISPRO 2 UNITS: 100 INJECTION, SOLUTION INTRAVENOUS; SUBCUTANEOUS at 17:06

## 2021-08-03 RX ADMIN — SODIUM CHLORIDE, PRESERVATIVE FREE 10 ML: 5 INJECTION INTRAVENOUS at 21:10

## 2021-08-03 RX ADMIN — TRAZODONE HYDROCHLORIDE 50 MG: 50 TABLET ORAL at 21:09

## 2021-08-03 RX ADMIN — MAGNESIUM CITRATE 296 ML: 1.75 LIQUID ORAL at 15:35

## 2021-08-03 RX ADMIN — ALPRAZOLAM 0.5 MG: 0.25 TABLET ORAL at 15:34

## 2021-08-03 RX ADMIN — PROPOFOL 30 MG: 10 INJECTION, EMULSION INTRAVENOUS at 07:12

## 2021-08-03 RX ADMIN — PANTOPRAZOLE SODIUM 40 MG: 40 INJECTION, POWDER, FOR SOLUTION INTRAVENOUS at 15:35

## 2021-08-03 RX ADMIN — BUSPIRONE HYDROCHLORIDE 15 MG: 5 TABLET ORAL at 10:55

## 2021-08-03 ASSESSMENT — PAIN SCALES - GENERAL
PAINLEVEL_OUTOF10: 0
PAINLEVEL_OUTOF10: 0

## 2021-08-03 ASSESSMENT — PAIN - FUNCTIONAL ASSESSMENT: PAIN_FUNCTIONAL_ASSESSMENT: 0-10

## 2021-08-03 NOTE — ANESTHESIA POSTPROCEDURE EVALUATION
Department of Anesthesiology  Postprocedure Note    Patient: Sara Chakraborty  MRN: [id number]  [place]: 1940  Date of evaluation: 8/3/2021  Time:  7:27 AM     Procedure Summary     Date: 08/03/21 Room / Location: [address]    Anesthesia Start: 2196 Anesthesia Stop: 1516    Procedure: EGD DIAGNOSTIC ONLY RECTAL EXAM PER DR. Lisa Freeman (N/A ) Diagnosis: (GI BLEED)    Surgeons: Dion Bob MD Responsible Provider: Demetria Wei MD    Anesthesia Type: TIVA, MAC, general ASA Status: 4          Anesthesia Type: TIVA, MAC, general    Abe Phase I:      Abe Phase II:      Last vitals: Reviewed and per EMR flowsheets.        Anesthesia Post Evaluation    Patient location during evaluation: bedside  Patient participation: complete - patient participated  Level of consciousness: awake  Pain score: 0  Airway patency: patent  Nausea & Vomiting: no nausea and no vomiting  Complications: no  Cardiovascular status: blood pressure returned to baseline  Respiratory status: acceptable and room air  Hydration status: euvolemic

## 2021-08-03 NOTE — BRIEF OP NOTE
BRIEF EGD REPORT:     Photos and full EGD report available by going to [facility] review\" then \"procedures\" then  \"EGD\" then \"View Endoscopy Report\"     IMPRESSION :   1) no fresh or old blood noted  2) small esophageal varices without overlying red  markings- not likely to have bled but possible  3) mild portal hypertensive gastropathy  4) no ulcer    -Etiology of the melena is unclear- rule out right-sided colon lesion and in view of her severe aortic stenosis this could be due to angiodysplasia anywhere in GI tract  - anesthesia was concerned about sedation this morning in light of current tachycardia and the severe aortic stenosis         Suggest:   1) cardiogy consult to see if able to tolerate colonoscopy and colon prep   2) tentatively plan slow bowel prep and colonoscopy Thursday

## 2021-08-03 NOTE — CONSULTS
Department of Internal Medicine  Gastroenterology Consult Note  Pastor Stephens. Nelson HUMPHRIES      Reason for Consult:  GI bleed    Primary Care Physician:  Shanika Wilson MD    History Obtained From:  patient    HISTORY OF PRESENT ILLNESS:              The patient is a 78 y.o.  female admitted after a fall. In the ED was found to have melenic stool and a Hb of 6.6. she had an EGD by me 5/2020 that showed an esophageal ring that was dilated. Portal hypertensive gastyropathy was suspected but there were no varices. There is a history of no\n-alcoholic cirrhosis and serologic workup 5/2020 was negative    Past Medical History:        Diagnosis Date    Anxiety     Cellulitis     foot.  [facility] 02/27/2020    seen on CT scan    [facility]2020    seen on CT    Constipation     Depression     Diabetes mellitus ([facility]     Hyperlipidemia     Hypertension     Insomnia     Rosacea     Sinus tachycardia        Past Surgical History:        Procedure Laterality Date    BACK SURGERY      HYSTERECTOMY      TOE SURGERY      UPPER GASTROINTESTINAL ENDOSCOPY N/A 5/26/2020    EGD DILATION BALLOON performed by Jean Carlos Alvarado MD at [facility] ENDOSCOPY       Medications Prior to Admission:    Prior to Admission medications    Medication Sig Start Date End Date Taking? Authorizing Provider   ALPRAZolam Aldon Nones) 0.5 MG tablet Take 0.5 mg by mouth 2 times daily as needed.  7/15/21   Historical Provider, MD   DULoxetine (CYMBALTA) 60 MG extended release capsule Take 60 mg by mouth daily 7/22/21   Historical Provider, MD   furosemide (LASIX) 20 MG tablet Take 20 mg by mouth daily as needed 4/28/21   Historical Provider, MD   metFORMIN (GLUCOPHAGE) 500 MG tablet Take 500 mg by mouth 2 times daily 6/23/21   Historical Provider, MD   busPIRone (BUSPAR) 15 MG tablet Take 15 mg by mouth 3 times daily  Patient taking differently: Take 15 mg by mouth 2 times daily Soft, non-tender with normal bowel sounds. No organomegaly or masses  NEUROLOGIC: no focal deficit detected  SKIN:  no lesions  EXTREMITIES: no clubbing, cyanosis, or edema    IMPRESSION:  1) GI bleed- ? Upper ( PUD, variceal)  2) history of cirrhosis (cryptogenic, MILTON or passive congestion)-no varices by EGD 1 year ago      RECOMMENDATIONS:  1) EGD          Leobardo Yao M.D

## 2021-08-03 NOTE — CONSULTS
[address] [id number] Continence Nurse  Consult Note       Guero De La Fuente  AGE: 78 y.o. GENDER: female  : 1940  TODAY'S DATE:  8/3/2021    Subjective:     Reason for CWOCN Evaluation and Assessment: wound assessment      Guero De La Fuente is a 78 y.o. female referred by:   [x] Physician  [] Nursing  [] Other:     Wound Identification:  Wound Type: pressure  Contributing Factors: diabetes, chronic pressure, decreased mobility, obesity and malnutrition        PAST MEDICAL HISTORY        Diagnosis Date    Anxiety     Cellulitis     foot.  [facility] 2020    seen on CT scan    [facility]    [facility] on CT    Constipation     Depression     Diabetes mellitus ([facility]     Hyperlipidemia     Hypertension     Insomnia     Rosacea     Sinus tachycardia        PAST SURGICAL HISTORY    Past Surgical History:   Procedure Laterality Date    BACK SURGERY      HYSTERECTOMY      TOE SURGERY      UPPER GASTROINTESTINAL ENDOSCOPY N/A 2020    EGD DILATION BALLOON performed by Govind Perez MD at [address] N/A 8/3/2021    EGD DIAGNOSTIC ONLY RECTAL EXAM PER DR. Rey Shni performed by Govind Perez MD at [facility]    No family history on file. SOCIAL HISTORY    Social History     Tobacco Use    Smoking status: Never Smoker    Smokeless tobacco: Never Used   Vaping Use    Vaping Use: Never used   Substance Use Topics    Alcohol use: No    Drug use: No       ALLERGIES    Allergies   Allergen Reactions    Chocolate Other (See Comments)     Throbbing headache    Nuts [Peanut-Containing Drug Products] Other (See Comments)     \"makes my nose run and like i have a bad cold\"       MEDICATIONS    No current facility-administered medications on file prior to encounter.      Current Outpatient Medications on File Prior to Encounter   Medication Sig Dispense Refill  ALPRAZolam (XANAX) 0.5 MG tablet Take 0.5 mg by mouth 2 times daily as needed.       DULoxetine (CYMBALTA) 60 MG extended release capsule Take 60 mg by mouth daily      furosemide (LASIX) 20 MG tablet Take 20 mg by mouth daily as needed      metFORMIN (GLUCOPHAGE) 500 MG tablet Take 500 mg by mouth 2 times daily      busPIRone (BUSPAR) 15 MG tablet Take 15 mg by mouth 3 times daily (Patient taking differently: Take 15 mg by mouth 2 times daily ) 90 tablet 0    traZODone (DESYREL) 50 MG tablet Take 1 tablet by mouth nightly 30 tablet 0    aspirin 81 MG EC tablet Take 81 mg by mouth daily           Objective:      BP 96/76   Pulse 115   Temp 98.1 °F (36.7 °C) (Oral)   Resp 17   Ht 5' 4\" (1.626 m)   Wt 134 lb 6.4 oz (61 kg)   SpO2 97%   BMI 23.07 kg/m²   Dmitriy Risk Score: Dmtiriy Scale Score: 17    LABS    CBC:   Lab Results   Component Value Date    WBC 11.3 08/03/2021    RBC 2.73 08/03/2021    HGB 7.1 08/03/2021    HCT 23.9 08/03/2021    MCV 86.8 08/03/2021    MCH 26.0 08/03/2021    MCHC 30.0 08/03/2021    RDW 16.4 08/03/2021     08/03/2021    MPV 9.9 08/03/2021     CMP:    Lab Results   Component Value Date     08/03/2021    K 4.0 08/03/2021     08/03/2021    CO2 26 08/03/2021    BUN 22 08/03/2021    CREATININE 0.7 08/03/2021    GFRAA >60 08/03/2021    LABGLOM >60 08/03/2021    GLUCOSE 174 08/03/2021    PROT 5.3 08/03/2021    LABALBU 3.3 08/03/2021    CALCIUM 8.1 08/03/2021    BILITOT 1.0 08/03/2021    ALKPHOS 126 08/03/2021    AST 37 08/03/2021    ALT 21 08/03/2021     Albumin:    Lab Results   Component Value Date    LABALBU 3.3 08/03/2021     PT/INR:    Lab Results   Component Value Date    PROTIME 13.9 08/02/2021    INR 1.08 08/02/2021     HgBA1c:    Lab Results   Component Value Date    LABA1C 7.5 08/02/2021         Assessment:     Patient Active Problem List   Diagnosis    Dizziness    Difficulty swallowing    PVC (premature ventricular contraction)    [facility]    Hypertension    Hyperlipemia    Moderate episode of recurrent major depressive disorder (HCC)    Pain of right lower extremity    Hematoma of right thigh    Primary osteoarthritis of both knees    Uncontrolled pain    Chest pain    Generalized anxiety disorder    Psychophysiological insomnia    Severe aortic stenosis    Moderate malnutrition (HCC)    GI bleed    Fall       Measurements:  Wound 03/16/17 Right medial knee blister cluster (Active)   Number of days: 1601       Wound 08/03/21 Buttocks Left (Active)   Wound Image   08/03/21 1400   Wound Etiology Pressure Stage  2 08/03/21 1400   Dressing Status Reinforced dressing 08/03/21 1400   Wound Cleansed Cleansed with saline 08/03/21 1400   Wound Length (cm) 0.5 cm 08/03/21 1400   Wound Width (cm) 0.3 cm 08/03/21 1400   Wound Depth (cm) 0.1 cm 08/03/21 1400   Wound Surface Area (cm^2) 0.15 cm^2 08/03/21 1400   Wound Volume (cm^3) 0.015 cm^3 08/03/21 1400   Distance Tunneling (cm) 0 cm 08/03/21 1400   Tunneling Position ___ O'Clock 0 08/03/21 1400   Undermining Starts ___ O'Clock 0 08/03/21 1400   Undermining Ends___ O'Clock 0 08/03/21 1400   Undermining Maxium Distance (cm) 0 08/03/21 1400   Wound Assessment Pink/red 08/03/21 1400   Drainage Amount Scant 08/03/21 1400   Drainage Description Serous 08/03/21 1400   Odor None 08/03/21 1400   Brianna-wound Assessment Blanchable erythema 08/03/21 1400   Margins Defined edges 08/03/21 1400   Wound Thickness Description not for Pressure Injury Partial thickness 08/03/21 1400   Number of days: 0       Response to treatment:  Well tolerated by patient. Pain Assessment:  Severity:  none  Quality of pain: na  Wound Pain Timing/Severity: na  Premedicated: no    Plan:     Plan of Care:       Pt in bed. Agreeable to wound assessment. Stage 2 noted to left buttock. Picture and measurements taken. Blanchable discoloration surrounding. Right buttock with scar tissue. Mepilex reinforced. Heels intact.  Jake Leal air pump applied to mattress. Pt lying on left side in bed. Pt is at mild risk for skin breakdown AEB Dmitriy. Follow Dmitriy orders. Specialty Bed Required : yes  [] Low Air Loss   [x] Pressure Redistribution  [] Fluid Immersion  [] Bariatric  [] Total Pressure Relief  [] Other:     Discharge Plan:  Placement for patient upon discharge: tbd  Hospice Care: no  Patient appropriate for Outpatient [address]: no    Patient/Caregiver Teaching:  Level of patient/caregiver understanding able to:   Voiced understanding.         Electronically signed by Pau Cruz RN, Andrei Collado on 8/3/2021 at 3:51 PM

## 2021-08-03 NOTE — PROGRESS NOTES
REPORT CALLED TO RN. PT AWAKE AND RESPONSIVE. VS STABLE. MAY HAVE FULL LIQUID DIET PER DR OCLINDRES.

## 2021-08-03 NOTE — CONSULTS
Chart reviewed  Full note to follow  Pt has severe AS  Moderate cardiac risk  ?heyde syndrome    Conclusions      Procedure Summary   SEVERE AS   NO CAD   NORMAL PAP ON RHC      Recommendations   FOR TAVR IF CLEARED BY YOLANDE      Signatures      --------------------------------------------------------   Electronically signed by Shereen Rodriguez MD   (Performing Physician) on 2020 at 12:59      Conclusions      Summary   Left ventricular function is normal, EF is estimated at 55-60%. Grade I diastolic dysfunction. Mildly dilated left atrium. There is severe fibrocalcific sclerosis of the aortic valve with evidence of   severe aortic stenosis with mean gradient across the aortic valve of 45 mmHg   and calculated aortic valve area was 0.94 square centimeters, all suggesting   severe aortic stenosis. Mild aortic regurgitation is noted. Pressure half-time 630 . Mitral annular calcification is present. Mild to Moderate mitral and tricuspid regurgitation is present. No evidence of pericardial effusion. Signature      ------------------------------------------------------------------   Electronically signed by Em Coker MD (Interpreting   physician) on 2020 at 01:53 PM                        Name:  Rosalva Yousif /Age/Sex: 1940  (78 y.o. female)   MRN & CSN:  [id number] & [id number] Admission Date/Time: 2021 10:35 AM   Location:  3108/3108-A PCP: Jayesh Garcia, [address] Day: 2          Referring physician:  Rl Anderson MD         Reason for consultation:  Gi bleed/ preop        Thanks for referral.    Information source: patient    CC;  Gi bleed      HPI:   Thank you for involving me in taking  care of Rosalva Yousif who  is a 78 y. o.year  Old female  Presents with  H/o severe AS, now with GI bleed , cardio consulted for severe AS  Was seen last year in preparation for TAVR but has bleeding issues                     Past medical history:    has a past medical history of Anxiety, Cellulitis, Cirrhosis ([address] Closed compression fracture of L2 lumbar vertebra, initial encounter ([address] Constipation, Depression, Diabetes mellitus ([address] Hyperlipidemia, Hypertension, Insomnia, Rosacea, and Sinus tachycardia. Past surgical history:   has a past surgical history that includes back surgery; Hysterectomy; Toe Surgery; and Upper gastrointestinal endoscopy (N/A, 5/26/2020). Social History:   reports that she has never smoked. She has never used smokeless tobacco. She reports that she does not drink alcohol and does not use drugs. Family history:  family history is not on file.     Allergies   Allergen Reactions    Chocolate Other (See Comments)     Throbbing headache    Nuts [Peanut-Containing Drug Products] Other (See Comments)     \"makes my nose run and like i have a bad cold\"       magnesium citrate solution 296 mL, Once  0.9 % sodium chloride infusion, PRN  pantoprazole (PROTONIX) injection 40 mg, Q12H  sodium chloride flush 0.9 % injection 5-40 mL, 2 times per day  sodium chloride flush 0.9 % injection 5-40 mL, PRN  0.9 % sodium chloride infusion, PRN  acetaminophen (TYLENOL) tablet 650 mg, Q6H PRN   Or  acetaminophen (TYLENOL) suppository 650 mg, Q6H PRN  glucose (GLUTOSE) 40 % oral gel 15 g, PRN  dextrose 50 % IV solution, PRN  glucagon (rDNA) injection 1 mg, PRN  dextrose 5 % solution, PRN  insulin lispro (HUMALOG) injection vial 0-6 Units, TID WC  insulin lispro (HUMALOG) injection vial 0-3 Units, Nightly  0.9 % sodium chloride infusion, Continuous  promethazine (PHENERGAN) tablet 12.5 mg, Q6H PRN   Or  promethazine (PHENERGAN) injection 12.5 mg, Q6H PRN  sodium chloride flush 0.9 % injection 10 mL, ONCE PRN  ALPRAZolam (XANAX) tablet 0.5 mg, TID  DULoxetine (CYMBALTA) extended release capsule 60 mg, Daily  traZODone (DESYREL) tablet 50 mg, Nightly  busPIRone (BUSPAR) tablet 15 mg, TID      Current Facility-Administered Medications   Medication Dose Route Frequency Provider Last Rate Last Admin    magnesium citrate solution 296 mL  296 mL Oral Once Kim Reeves MD        0.9 % sodium chloride infusion   Intravenous PRN Kim Reeves MD        pantoprazole (PROTONIX) injection 40 mg  40 mg Intravenous Q12H Kim Reeves MD   40 mg at 08/03/21 0117    sodium chloride flush 0.9 % injection 5-40 mL  5-40 mL Intravenous 2 times per day Kim Reeves MD   10 mL at 08/02/21 2140    sodium chloride flush 0.9 % injection 5-40 mL  5-40 mL Intravenous PRN Kim Reeves MD        0.9 % sodium chloride infusion  25 mL Intravenous PRN Kim Reeves MD        acetaminophen (TYLENOL) tablet 650 mg  650 mg Oral Q6H PRN Kim Reeves MD        Or   Josiane Borden acetaminophen (TYLENOL) suppository 650 mg  650 mg Rectal Q6H PRN Kim Reeves MD        glucose (GLUTOSE) 40 % oral gel 15 g  15 g Oral PRN Kim Reeves MD        dextrose 50 % IV solution  12.5 g Intravenous PRN Kim Reeves MD        glucagon (rDNA) injection 1 mg  1 mg Intramuscular PRN Kim Reeves MD        dextrose 5 % solution  100 mL/hr Intravenous PRN Kim Reeves MD        insulin lispro (HUMALOG) injection vial 0-6 Units  0-6 Units Subcutaneous TID WC Kim Reeves MD   3 Units at 08/03/21 1309    insulin lispro (HUMALOG) injection vial 0-3 Units  0-3 Units Subcutaneous Nightly Kim Reeves MD   1 Units at 08/02/21 2140    0.9 % sodium chloride infusion   Intravenous Continuous Kim Reeves  mL/hr at 08/02/21 1542 New Bag at 08/02/21 1542    promethazine (PHENERGAN) tablet 12.5 mg  12.5 mg Oral Q6H PRPATY Reeves MD        Or   Josiane Borden promethazine (PHENERGAN) injection 12.5 mg  12.5 mg Intramuscular Q6H PRPATY Reeves MD        sodium chloride flush 0.9 % injection 10 mL  10 mL Intravenous ONCE PRN Kim Reeves MD        ALPRAZolam Klever Ket) tablet 0.5 mg  0.5 mg Oral TID Kim Reeves MD   0.5 mg at 08/03/21 1058    DULoxetine (CYMBALTA) extended release capsule 60 mg  60 mg Oral Daily William Stark MD   60 mg at 08/03/21 1056    traZODone (DESYREL) tablet 50 mg  50 mg Oral Nightly William Stark MD   50 mg at 08/02/21 2140    busPIRone (BUSPAR) tablet 15 mg  15 mg Oral TID William Stark MD   15 mg at 08/03/21 1055     Review of Systems:  All 14 systems reviewed, all negative except for  GI bleed    Physical Examination:    BP 96/76   Pulse 115   Temp 98.1 °F (36.7 °C) (Oral)   Resp 17   Ht 5' 4\" (1.626 m)   Wt 134 lb 6.4 oz (61 kg)   SpO2 97%   BMI 23.07 kg/m²      Wt Readings from Last 3 Encounters:   08/03/21 134 lb 6.4 oz (61 kg)   08/24/20 133 lb (60.3 kg)   05/27/20 127 lb 11.2 oz (57.9 kg)     Body mass index is 23.07 kg/m². General Appearance:  fair  Head: normocephalic     Eyes: normal, noninjected conjunctiva    ENT: normal mucosa, noninjected throat, normal     NECK: No JVP  No thyromegaly        Cardiovascular: No thrills palpated   Auscultation: Normal S1 and S2,  no murmur   carotid bruit no   Abdominal Aorta no bruit    Respiratory:    Breath sounds Clear = 0    Extremities:  Trace Edema clubbing ,   mno cyanosis    SKIN: Warm and well perfused, no pallor or cyanosis    Vascular exam:  Pedal Pulses: palp  bilaterally        Abdomen:  No masses or tenderness. No organomegaly noted. Neurological:  Oriented to time, place, and person   No focal neurological deficit noted. Psychiatric:normal mood, no anxiety    Lab Review   Recent Labs     08/03/21  0234 08/03/21  0234 08/03/21  0936   WBC 11.3*  --   --    HGB 7.1*   < > 7.3*   HCT 23.7*   < > 24.7*     --   --     < > = values in this interval not displayed. Recent Labs     08/03/21 0234   *   K 4.0      CO2 26   BUN 22   CREATININE 0.7     Recent Labs     08/03/21 0234   AST 37   ALT 21   BILITOT 1.0   ALKPHOS 126     No results for input(s): TROPONINI in the last 72 hours.   No results found for: BNP  Lab Results   Component Value Date    INR 1.08 08/02/2021    PROTIME 13.9 08/02/2021           Assessment/Recommendations:     - Has severe AS  - ? GI bleed from varices  - need TAVR, post GI issues addressed         Janelle Robles MD, 8/3/2021 1:30 PM

## 2021-08-03 NOTE — CARE COORDINATION
Chart reviewed. Pt seen by ED/CM. Pt may need to go to a SNF per ED/CM note. PT/OT ordered and requested via WB. CM to discuss d/c plan with pt and daughter after the PT/OT evals have been completed. Pt has Medicare and will not need a pre-cert if SNF needed.   TE

## 2021-08-03 NOTE — PROGRESS NOTES
Physical Therapy    Facility/Department: [address] [place] [address]  Initial Assessment    NAME: Nasra Dhaliwal  : 1940  MRN: [id number]    Date of Service: 8/3/2021    Discharge Recommendations:  Subacute/Skilled Nursing Facility        Assessment   Assessment: Pt is an 78 y.o. female with medical history, surgical history, co-morbidities, and personal factors including Anxiety, Cellulitis, Cirrhosis ([facility] Closed compression fracture of L2 lumbar vertebra, initial encounter ([facility] Constipation, Depression, Diabetes mellitus ([facility] [address] Hyperlipidemia, Hypertension, Insomnia, Rosacea, Sinus tachycardia, back surgery; Hysterectomy; Toe Surgery; Upper gastrointestinal endoscopy (N/A, 2020); and Upper gastrointestinal endoscopy (N/A, 8/3/2021) with admission for Fall, Gastrointestinal hemorrhage, Low back pain, Contusion of left lower extremity, and Skin ulcer of sacrum. Prior to admission, pt was modified independent with functional mobility and ADLs. Examination of body systems reveals decreased strength, decreased balance, expressive aphasia, and decreased independence with functional mobility. Prognosis: Good  Decision Making: High Complexity  Clinical Presentation: unpredictable  PT Education: Goals;PT Role;Plan of Care;Equipment; Functional Mobility Training;Transfer Training;Orientation;Gait Training;General Safety  REQUIRES PT FOLLOW UP: Yes  Activity Tolerance  Activity Tolerance: Patient Tolerated treatment well       Restrictions  Restrictions/Precautions  Restrictions/Precautions: General Precautions, Fall Risk  Vision/Hearing  Vision: Within Functional Limits  Hearing: Within functional limits     Subjective  General  Chart Reviewed: Yes  Patient assessed for rehabilitation services?: Yes  Family / Caregiver Present: No  Follows Commands: Impaired (follows % of commands)  General Comment  Comments: patient demonstrates expressive aphasia at this time; RN aware; per RN expressive aphasia is baseline  Pain Screening  Patient Currently in Pain: Denies  Pain Assessment  Pain Assessment: 0-10  Vital Signs  Patient Currently in Pain: Denies       Orientation  Orientation  Overall Orientation Status: Impaired (oriented to person and place when given choices)  Orientation Level: Disoriented to time;Oriented to person;Oriented to place  Social/Functional History  Social/Functional History  Lives With: Alone  Type of Home: House  Home Layout: One level  Home Access: Stairs to enter with rails  Entrance Stairs - Number of Steps: 1  Bathroom Shower/Tub: Tub/Shower unit  Home Equipment: 4 wheeled walker  Receives Help From: Home health  ADL Assistance: Independent  Homemaking Assistance: Needs assistance  Ambulation Assistance: Independent (mod I 4ww)  Transfer Assistance: Independent  Additional Comments: portion of social/functional history gathered from 2020 PT evaluation; needs verified  Cognition   Cognition  Overall Cognitive Status: Exceptions  Following Commands: Inconsistently follows commands; Follows one step commands with increased time  Safety Judgement: Decreased awareness of need for safety;Decreased awareness of need for assistance  Problem Solving: Decreased awareness of errors  Insights: Decreased awareness of deficits  Initiation: Requires cues for some  Sequencing: Requires cues for some    Objective             Strength RLE  Comment: knee extension: 3+/5, ankle DF: 4/5  Strength LLE  Comment: knee extension: 4-/5, ankle DF: 4/5           Transfers  Sit to Stand: Minimal Assistance; Moderate Assistance (with verbal cues to push through chair and avoid pulling on walker)  Stand to sit: Minimal Assistance (with verbal cues to feel chair against back of legs, reach back, and sit slowly)  Ambulation  Ambulation?: Yes  Ambulation 1  Surface: level tile  Device: Rolling Walker  Assistance: Contact guard assistance  Quality of Gait: decreased step length bilaterally, shuffling gait, decreased gait speed, intermittently retropulsive, unsteady  Distance: 3 feet  Comments: with verbal and tactile cues for BLE placement, walker placement, and sequence     Balance  Posture: Fair  Sitting - Static: Good  Sitting - Dynamic: Fair;+  Standing - Static: -;Fair  Standing - Dynamic: Poor;+      Gait Training:  Cues were given for safety, sequence, device management, balance, posture, awareness, path. Plan   Plan  Times per week: 3+  Current Treatment Recommendations: Strengthening, ROM, Balance Training, Functional Mobility Training, Transfer Training, ADL/Self-care Training, Gait Training, Patient/Caregiver Education & Training, IADL Training, Stair training, Equipment Evaluation, Education, & procurement, Neuromuscular Re-education, Pain Management, Home Exercise Program, Positioning, Endurance Training, Safety Education & Training, Cognitive/Perceptual Training, Cognitive Reorientation  Safety Devices  Type of devices: All fall risk precautions in place, Left in chair, Call light within reach, Chair alarm in place, Nurse notified, Gait belt, Patient at risk for falls      AM-PAC Score  AM-PAC Inpatient Mobility Raw Score : 11 (08/03/21 1846)  AM-PAC Inpatient T-Scale Score : 33.86 (08/03/21 1846)  Mobility Inpatient CMS 0-100% Score: 72.57 (08/03/21 1846)  Mobility Inpatient CMS G-Code Modifier : CL (08/03/21 1846)          Goals  Long term goals  Time Frame for Long term goals :  In one week:  Long term goal 1: Pt will complete all bed mobility with supervision  Long term goal 2: Pt will complete sit <> stand transfers with SBAx1  Long term goal 3: Pt will ambulate 25 feet with CGAx1 with LRAD (chair follow safety)  Long term goal 4: Pt will independently complete 3 sets of 10 reps of BLE AROM exercises in available and allowed ROM       Time In: 1758  Time Out: 1827  Total Treatment Time: 29  Timed Code Treatment Minutes: 75032 Alicia Nunn PT, DPT  License #: [id number]

## 2021-08-03 NOTE — PROGRESS NOTES
Occupational Therapy    [facility] ACUTE CARE OCCUPATIONAL THERAPY EVALUATION  Dori Mcgee, 1940, 3108/3108-A, 8/3/2021    History  [place]:  The primary encounter diagnosis was Fall, initial encounter. Diagnoses of Gastrointestinal hemorrhage, unspecified gastrointestinal hemorrhage type, Low back pain, unspecified back pain laterality, unspecified chronicity, unspecified whether sciatica present, Contusion of left lower extremity, initial encounter, and Skin ulcer of sacrum, limited to breakdown of skin ([address] were also pertinent to this visit. Patient  has a past medical history of Anxiety, Cellulitis, Cirrhosis ([address] Closed compression fracture of L2 lumbar vertebra, initial encounter ([address] Constipation, Depression, Diabetes mellitus ([address] Hyperlipidemia, Hypertension, Insomnia, Rosacea, and Sinus tachycardia. Patient  has a past surgical history that includes back surgery; Hysterectomy; Toe Surgery; Upper gastrointestinal endoscopy (N/A, 5/26/2020); and Upper gastrointestinal endoscopy (N/A, 8/3/2021). Subjective:  Patient states:  Qing Thomson are we doing? \" Pt required frequent re-orientation to task. Pain:  Denies. Communication with other providers:  Handoff to RN  Restrictions: General Precautions, Fall Risk    Home Setup/Prior level of function     Occupational profile (relevant social history and personal factors):    Social/Functional History  Lives With: Alone(son stops by daily)  Type of Home: House  Home Layout: One level  Home Equipment: 4 wheeled walker  Receives Help From: Home health  ADL Assistance: Independent(has assist for shower x1 per week)  [address]: Independent  Homemaking Responsibilities: Yes  Ambulation Assistance: Independent  Transfer Assistance: Independent  Pt reports no recent falls    Pt is a poor historian.  Above information taken from 04/26/2020    Examination of body systems (includes body structures/functions, activity/participation limitations):  · Observation:  Supine in bed upon arrival, agreeable to therapy  · Vision:  [facility]  · Hearing:  [facility]  · Cardiopulmonary:  No 02 needs. HR increased ~130 sitting EOB. HR did not increase above 130. Body Systems and functions:  · ROM R/L:  ~100 degrees shoulder flexion, distal WFL. · Strength R/L:  4/5,   · Sensation: Unable to assess due to decreased cognition  · Tone: Normal  · Coordination: Decreased BUE speed/accuracy, tremors at all times  · Perception: Mod decreased initiation/sequencing    Activities of Daily Living (ADLs):  · Feeding: Setup/Supervision (pt feeding at end of session, assistance to open up containers and setup)  · Grooming: [place] (washing face w/ warm washcloth sitting EOB)  · UB bathing: SBA  · LB bathing: maxA  · UB dressing: SBA  · LB dressing: maxA (Donning socks sitting EOB, assistance due to decreased dynamic sitting balance)  · Toileting: maxA    Cognitive and Psychosocial Functioning:  · Overall cognitive status: AxO to self only, decreased short term memory, decreased problem solving, decreased safety awareness, increased processing time, frequent re-orientation to task  · Affect: Confused       Mobility:  · Supine to sit:  modA  · Transfers: modA STS from EOB, modA stand step transfer from EOB to reclining chair  · Sitting balance:  Geena due to great posterior lean, CGA w/ proper hand positioning and posture. · Standing balance:  modA.   · Toilet/Shower Transfers: DNT             AM-PAC Daily Activity Inpatient   How much help for putting on and taking off regular lower body clothing?: Total  How much help for Bathing?: A Lot  How much help for Toileting?: Total  How much help for putting on and taking off regular upper body clothing?: A Little  How much help for taking care of personal grooming?: A Little  How much help for eating meals?: A Little  AM-PAC Inpatient Daily Activity Raw Score: 13  AM-PAC Inpatient ADL T-Scale Score : 32.03  ADL Inpatient CMS 0-100% Score: 63.03  ADL Inpatient CMS G-Code Modifier : CL    Treatment:  Self Care Training:   Cues were given for safety, sequence, UE/LE placement, visual cues, and balance. Activities performed today included grooming, LB dressing, feeding    Therapeutic Activity Training:   Therapeutic activity training was instructed today. Cues were given for safety, sequence, UE/LE placement, awareness, and balance. Activities performed today included bed mobility training, sup-sit, sit-stand, functional mobility, stand to sit      Safety: patient left in chair with chair alarm, call light within reach, RN notified, gait belt used. Assessment:  Pt is a 79 yo female admitted from home for GI Bleed. Pt at baseline is Independent for ADLs Independent for high level IADLs and Independent for functional transfers/mobility . Pt currently presents w/ deficits in ADL and high level IADL independence, functional activity tolerance, dynamic sitting and standing balance and tolerance and functional transfers, BUE strength/ROM, BUE coordination, initiation/sequencing and cognition Pt would benefit from continued acute care OT services w/ discharge to SNF  Complexity: Moderate  Prognosis: Good, no significant barriers to participation at this time.    Plan  Times per week: 3x+  Times per day: Daily  Current Treatment Recommendations: Strengthening, Endurance Training, Patient/Caregiver Education & Training, Equipment Evaluation, Education, & procurement, Self-Care / ADL, ROM, Cognitive Reorientation, Balance Training, Pain Management, Home Management Training, Cognitive/Perceptual Training, Functional Mobility Training, Safety Education & Training, Positioning     Equipment: defer    Goals:  Pt goal: go home  Time Frame for STGs: discharge  Goal 1: Pt will perform UE ADLs Supervision  Goal 2: Pt will perform LE ADLs Supervision  Goal 3: Pt will perform toileting Supervision  Goal 4: Pt will perform functional transfer w/ AD Supervision  Goal 5: Pt will perform functional mobility w/ AD Supervision  Goal 6: Pt will perform therex/theract in order to increase functional activity tolerance and dynamic standing balance    Treatment plan:  Pt will perform functional task in stand reaching in all 3 planes in order to increase dynamic standing balance and functional activity tolerance    Recommendations for NURSING activity: Up to chair for all 3 meals and up to [facility] for all toileting needs    Time:   Time in: 1634  Time out: 1657  Timed treatment minutes: 8 minutes  Total time: 23 minutes    Electronically signed by:   Michael BOATENG/L [id number]  5:14 PM,8/3/2021

## 2021-08-04 ENCOUNTER — ANESTHESIA EVENT (OUTPATIENT)
Dept: ENDOSCOPY | Age: 81
DRG: 378 | End: 2021-08-04
Payer: MEDICARE

## 2021-08-04 LAB
ANION GAP SERPL CALCULATED.3IONS-SCNC: 7 MMOL/L (ref 4–16)
BUN BLDV-MCNC: 19 MG/DL (ref 6–23)
CALCIUM SERPL-MCNC: 8.2 MG/DL (ref 8.3–10.6)
CHLORIDE BLD-SCNC: 103 MMOL/L (ref 99–110)
CO2: 25 MMOL/L (ref 21–32)
CREAT SERPL-MCNC: 0.5 MG/DL (ref 0.6–1.1)
GFR AFRICAN AMERICAN: >60 ML/MIN/1.73M2
GFR NON-AFRICAN AMERICAN: >60 ML/MIN/1.73M2
GLUCOSE BLD-MCNC: 181 MG/DL (ref 70–99)
GLUCOSE BLD-MCNC: 189 MG/DL (ref 70–99)
GLUCOSE BLD-MCNC: 196 MG/DL (ref 70–99)
GLUCOSE BLD-MCNC: 226 MG/DL (ref 70–99)
GLUCOSE BLD-MCNC: 253 MG/DL (ref 70–99)
HCT VFR BLD CALC: 24.5 % (ref 37–47)
HCT VFR BLD CALC: 33.7 % (ref 37–47)
HEMOGLOBIN: 7 GM/DL (ref 12.5–16)
HEMOGLOBIN: 9.8 GM/DL (ref 12.5–16)
MAGNESIUM: 2.6 MG/DL (ref 1.8–2.4)
POTASSIUM SERPL-SCNC: 3.8 MMOL/L (ref 3.5–5.1)
SODIUM BLD-SCNC: 135 MMOL/L (ref 135–145)

## 2021-08-04 PROCEDURE — 94761 N-INVAS EAR/PLS OXIMETRY MLT: CPT

## 2021-08-04 PROCEDURE — 99232 SBSQ HOSP IP/OBS MODERATE 35: CPT | Performed by: SPECIALIST

## 2021-08-04 PROCEDURE — 6360000002 HC RX W HCPCS: Performed by: SPECIALIST

## 2021-08-04 PROCEDURE — 6370000000 HC RX 637 (ALT 250 FOR IP): Performed by: SPECIALIST

## 2021-08-04 PROCEDURE — C9113 INJ PANTOPRAZOLE SODIUM, VIA: HCPCS | Performed by: SPECIALIST

## 2021-08-04 PROCEDURE — 2140000000 HC CCU INTERMEDIATE R&B

## 2021-08-04 PROCEDURE — 83735 ASSAY OF MAGNESIUM: CPT

## 2021-08-04 PROCEDURE — 99232 SBSQ HOSP IP/OBS MODERATE 35: CPT | Performed by: INTERNAL MEDICINE

## 2021-08-04 PROCEDURE — 85018 HEMOGLOBIN: CPT

## 2021-08-04 PROCEDURE — 85014 HEMATOCRIT: CPT

## 2021-08-04 PROCEDURE — 2580000003 HC RX 258: Performed by: SPECIALIST

## 2021-08-04 PROCEDURE — 36430 TRANSFUSION BLD/BLD COMPNT: CPT

## 2021-08-04 PROCEDURE — 80048 BASIC METABOLIC PNL TOTAL CA: CPT

## 2021-08-04 PROCEDURE — 36415 COLL VENOUS BLD VENIPUNCTURE: CPT

## 2021-08-04 PROCEDURE — P9016 RBC LEUKOCYTES REDUCED: HCPCS

## 2021-08-04 PROCEDURE — APPSS45 APP SPLIT SHARED TIME 31-45 MINUTES: Performed by: NURSE PRACTITIONER

## 2021-08-04 PROCEDURE — 82962 GLUCOSE BLOOD TEST: CPT

## 2021-08-04 RX ORDER — SODIUM CHLORIDE 9 MG/ML
INJECTION, SOLUTION INTRAVENOUS PRN
Status: DISCONTINUED | OUTPATIENT
Start: 2021-08-04 | End: 2021-08-08 | Stop reason: HOSPADM

## 2021-08-04 RX ADMIN — BUSPIRONE HYDROCHLORIDE 15 MG: 5 TABLET ORAL at 21:45

## 2021-08-04 RX ADMIN — SODIUM CHLORIDE: 9 INJECTION, SOLUTION INTRAVENOUS at 16:10

## 2021-08-04 RX ADMIN — BUSPIRONE HYDROCHLORIDE 15 MG: 5 TABLET ORAL at 14:02

## 2021-08-04 RX ADMIN — SODIUM CHLORIDE, PRESERVATIVE FREE 10 ML: 5 INJECTION INTRAVENOUS at 16:13

## 2021-08-04 RX ADMIN — ALPRAZOLAM 0.5 MG: 0.25 TABLET ORAL at 21:45

## 2021-08-04 RX ADMIN — PANTOPRAZOLE SODIUM 40 MG: 40 INJECTION, POWDER, FOR SOLUTION INTRAVENOUS at 00:19

## 2021-08-04 RX ADMIN — PANTOPRAZOLE SODIUM 40 MG: 40 INJECTION, POWDER, FOR SOLUTION INTRAVENOUS at 16:11

## 2021-08-04 RX ADMIN — DULOXETINE HYDROCHLORIDE 60 MG: 30 CAPSULE, DELAYED RELEASE ORAL at 09:45

## 2021-08-04 RX ADMIN — POLYETHYLENE GLYCOL 3350, SODIUM SULFATE ANHYDROUS, SODIUM BICARBONATE, SODIUM CHLORIDE, POTASSIUM CHLORIDE 2000 ML: 236; 22.74; 6.74; 5.86; 2.97 POWDER, FOR SOLUTION ORAL at 19:07

## 2021-08-04 RX ADMIN — BUSPIRONE HYDROCHLORIDE 15 MG: 5 TABLET ORAL at 09:45

## 2021-08-04 RX ADMIN — INSULIN LISPRO 3 UNITS: 100 INJECTION, SOLUTION INTRAVENOUS; SUBCUTANEOUS at 16:19

## 2021-08-04 RX ADMIN — INSULIN LISPRO 1 UNITS: 100 INJECTION, SOLUTION INTRAVENOUS; SUBCUTANEOUS at 11:36

## 2021-08-04 RX ADMIN — MAGNESIUM CITRATE 296 ML: 1.75 LIQUID ORAL at 14:01

## 2021-08-04 RX ADMIN — ALPRAZOLAM 0.5 MG: 0.25 TABLET ORAL at 09:45

## 2021-08-04 RX ADMIN — TRAZODONE HYDROCHLORIDE 50 MG: 50 TABLET ORAL at 21:45

## 2021-08-04 RX ADMIN — SODIUM CHLORIDE, PRESERVATIVE FREE 10 ML: 5 INJECTION INTRAVENOUS at 21:51

## 2021-08-04 RX ADMIN — SODIUM CHLORIDE, PRESERVATIVE FREE 10 ML: 5 INJECTION INTRAVENOUS at 09:45

## 2021-08-04 RX ADMIN — INSULIN LISPRO 2 UNITS: 100 INJECTION, SOLUTION INTRAVENOUS; SUBCUTANEOUS at 09:23

## 2021-08-04 RX ADMIN — ALPRAZOLAM 0.5 MG: 0.25 TABLET ORAL at 14:02

## 2021-08-04 NOTE — FLOWSHEET NOTE
08/04/21 1506   Encounter Summary   Services provided to: Patient not available   Referral/Consult From: [facility] Children;Family members   Continue Visiting Yes   Routine   Assessment Unable to respond  (At the time of visit patient Care was busy with patient)

## 2021-08-04 NOTE — PROGRESS NOTES
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

## 2021-08-04 NOTE — CARE COORDINATION
Pt is approved to go to [facility]. Pt can go whenever she is medically ready, no pre-cert required. Notified Dr Mary Kay Tong via PS. Electronic HENS completed and placed in packet. D/c instructions are on front of packet located with the soft chart.   Rapid Covid needed on day of d/c.  TE

## 2021-08-04 NOTE — PROGRESS NOTES
Progress Note        Name:  Yan Lee /Age/Sex: 1940  (78 y.o. female)   MRN & CSN:  [id number] & [id number] Admission Date/Time: 2021 10:35 AM   Location:  [address] [place] [address] PCP: Prema Jackson MD       Hospital Day: 3    Assessment and Plan:   Yan Lee is a 78 y.o.  female  with past medical history of DM, HTN who presents after a fall with several days of hematochezia. Reported to have melenic stools in ED, Hg 6.6. Acute blood loss anemia secondary to likely GI Bleed  GI consulted, recommendations appreciated  EGD to be peformed. Hx of EGD 2020 showed esophageal rings post dilation, no varices noted. Transfused 2u PRBC 21 & 21  Post transfusion H/H pending  Clear liquid diet as tolerated. Appears euvolemic. Hold IVF as tolerated. Trend H & H  Continue to hold ASA  Continue IV PPI  Coags WNL  CT abd/pelvis with contrast shows No acute intra abdominal process. Cirrhosis & Esophageal varices resent. Cholelithiasis w/ moderate gallbladder distension. LFTs normal    History of cirrhosis  Unclear etiology  GI Following    Type 2 Diabetes (A1c7.5) with hypeglycemia () on admission  - SSI & Accuchecks    SIRS (WBC 14.8)  Leukocytosis likely reactive secondary to fall and GI hemorrhage    Mechanical fall  - No acute injury or fracture noted.  - PT/OT eval when appropriate. Stage 2 Sacral decubitus ulcer  -wound care  - monitor for progression    History of Paranoid schizophrenia with depression and anxiety. - Continue Home regimen  - Duloxetine, Buspar, Xanax and trazodone. Severe Aortic Stenosis  - monitor volume status  - monitor for cardiac decompensation      Diet ADULT DIET;  Clear Liquid    DVT Prophylaxis [] Lovenox, []  Heparin, [] SCDs, [] Ambulation  [] Long term AC   GI Prophylaxis [x] PPI,  [] H2 Blocker,  [] Carafate,  [] Diet,  [] No GI prophylaxis, N/A: patient is not under significant medical stress, non-ICU or is receiving a diet/tube feeds Code Status Full Code Full CODE   Disposition Patient requires continued admission due to GI bleed. Discharge Plan: back to home / [facility] / when able / discharge to hospice after seen by case management team. Patient plans to return home upon discharge   MDM [] Low, [] Moderate,[x]  High  Patient's risk as above due to:      [x] One or more chronic illnesses with mild exacerbation progression      [x] Two or more stable chronic illnesses      [x] Undiagnosed new problem with uncertain prognosis      [] Elective major surgery      [x]Prescription drug management       History of Present Illness:     Chief Complaint: GI bleed  Mars Son is a 78 y.o.  female  who presents with anemia. Patient seen and examined bedside in room 3108. She is alert to self only. Patient on room air in no acute distress. Denies any abdominal pain, nausea or continued hematochezia. Plan for EGD today. Ten point ROS reviewed negative, unless as noted above    Objective: Intake/Output Summary (Last 24 hours) at 8/4/2021 1459  Last data filed at 8/4/2021 6614  Gross per 24 hour   Intake 380 ml   Output --   Net 380 ml      Vitals:   Vitals:    08/04/21 1311   BP: 119/63   Pulse: 123   Resp: 24   Temp: 98.2 °F (36.8 °C)   SpO2: 92%     Physical Exam:   GEN Awake female, sitting upright in bed in no apparent distress. Appears given age. EYES Pupils are equally round. No scleral erythema, discharge, or conjunctivitis. HENT Mucous membranes are moist. Oral pharynx without exudates, no evidence of thrush. NECK Supple, no apparent thyromegaly or masses. RESP Clear to auscultation, no wheezes, rales or rhonchi. Symmetric chest movement while on room air. CARDIO/VASC S1/S2 auscultated. Regular Tachycardic rate 057'W; Grade 2 systolic murmurs. No rubs, or gallops. No JVD or carotid bruits. Peripheral pulses equal bilaterally and palpable. No peripheral edema.   GI Abdomen is soft without significant tenderness, masses, or guarding. Bowel sounds are normoactive. Rectal exam deferred.  No costovertebral angle tenderness. Normal appearing external genitalia. Lewis catheter is not present. HEME/LYMPH No palpable cervical lymphadenopathy and no hepatosplenomegaly. No petechiae or ecchymoses. MSK No gross joint deformities. SKIN Normal coloration, warm, dry. NEURO Cranial nerves appear grossly intact, normal speech, no lateralizing weakness. PSYCH Awake, alert, oriented x 4. Affect appropriate. Past Medical History:      Past Medical History:   Diagnosis Date    Anxiety     Cellulitis     foot.  Cirrhosis ([facility] 02/27/2020    seen on CT scan    Closed compression fracture of L2 lumbar vertebra, initial encounter ([facility] 02/29/2020    seen on CT    Constipation     Depression     Diabetes mellitus ([facility]     Hyperlipidemia     Hypertension     Insomnia     Rosacea     Sinus tachycardia      PSHX:  has a past surgical history that includes back surgery; Hysterectomy; Toe Surgery; Upper gastrointestinal endoscopy (N/A, 5/26/2020); and Upper gastrointestinal endoscopy (N/A, 8/3/2021). Allergies: Allergies   Allergen Reactions    Chocolate Other (See Comments)     Throbbing headache    Nuts [Peanut-Containing Drug Products] Other (See Comments)     \"makes my nose run and like i have a bad cold\"       FAM HX: family history is not on file.   Soc HX:   Social History     Socioeconomic History    Marital status:      Spouse name: None    Number of children: None    Years of education: None    Highest education level: None   Occupational History    None   Tobacco Use    Smoking status: Never Smoker    Smokeless tobacco: Never Used   Vaping Use    Vaping Use: Never used   Substance and Sexual Activity    Alcohol use: No    Drug use: No    Sexual activity: None   Other Topics Concern    None   Social History Narrative    None     Social Determinants of Health     Financial Resource Strain:     Difficulty of Paying Living Expenses:    Food Insecurity:     Worried About Running Out of Food in the Last Year:     [address] in the Last Year:    Transportation Needs:     Lack of Transportation (Medical):      Lack of Transportation (Non-Medical):    Physical Activity:     Days of Exercise per Week:     Minutes of Exercise per Session:    Stress:     Feeling of Stress :    Social Connections:     Frequency of Communication with Friends and Family:     Frequency of Social Gatherings with Friends and Family:     Attends Islam Services:     Active Member of Clubs or Organizations:     Attends Club or Organization Meetings:     Marital Status:    Intimate Partner Violence:     Fear of Current or Ex-Partner:     Emotionally Abused:     Physically Abused:     Sexually Abused:        Medications:   Medications:    polyethylene glycol  2,000 mL Oral Once    pantoprazole  40 mg Intravenous Q12H    sodium chloride flush  5-40 mL Intravenous 2 times per day    insulin lispro  0-6 Units Subcutaneous TID WC    insulin lispro  0-3 Units Subcutaneous Nightly    ALPRAZolam  0.5 mg Oral TID    DULoxetine  60 mg Oral Daily    traZODone  50 mg Oral Nightly    busPIRone  15 mg Oral TID      Infusions:    sodium chloride      sodium chloride      sodium chloride      dextrose      sodium chloride 125 mL/hr at 08/02/21 1542     PRN Meds: sodium chloride, , PRN  sodium chloride, , PRN  sodium chloride flush, 5-40 mL, PRN  sodium chloride, 25 mL, PRN  acetaminophen, 650 mg, Q6H PRN   Or  acetaminophen, 650 mg, Q6H PRN  glucose, 15 g, PRN  dextrose, 12.5 g, PRN  glucagon (rDNA), 1 mg, PRN  dextrose, 100 mL/hr, PRN  promethazine, 12.5 mg, Q6H PRN   Or  promethazine, 12.5 mg, Q6H PRN  sodium chloride flush, 10 mL, ONCE PRN          Electronically signed by Hernan Mcgowan DO on 8/4/2021 at 2:59 PM

## 2021-08-04 NOTE — PROGRESS NOTES
Progress Note        Name:  Kimmie Reeder /Age/Sex: 1940  (78 y.o. female)   MRN & CSN:  [id number] & [id number] Admission Date/Time: 2021 10:35 AM   Location:  [facility]/3108-A PCP: Marv Taveras MD       Hospital Day: 3    Assessment and Plan:   Kimmie Reeder is a 78 y.o.  female  with past medical history of DM, HTN who presents after a fall with several days of hematochezia. Reported to have melenic stools in ED, Hg 6.6.      Acute blood loss anemia secondary to likely GI Bleed  Hg continues to trend down. GI consulted, recommendations appreciated  EGD 8/3/21 showed small esophageal varices & mild portal hypertensive gastropathy. No definitive source noted. Cardiology consulted to clear for colonoscopy. Plan for slow bowel prep & colonoscopy to rule out andgiodysplasia and lower GI bleed. Hx of EGD 2020 showed esophageal rings post dilation, no varices noted. Transfused 2u PRBC 21 & 21  Post transfusion H/H pending  Clear liquid diet as tolerated. Appears euvolemic. Hold IVF as tolerated. Trend H & H  Continue to hold ASA  Continue IV PPI  Coags WNL  CT abd/pelvis with contrast shows No acute intra abdominal process. Cirrhosis & Esophageal varices resent. Cholelithiasis w/ moderate gallbladder distension. LFTs normal     History of cirrhosis  Unclear etiology  GI Following     Type 2 Diabetes (A1c7.5) with hypeglycemia () on admission  - SSI & Accuchecks     SIRS (WBC 14.8)  Leukocytosis likely reactive secondary to fall and GI hemorrhage     Mechanical fall  - No acute injury or fracture noted.  - PT/OT eval when appropriate.     Stage 2 Sacral decubitus ulcer  -wound care  - monitor for progression    History of Paranoid schizophrenia with depression and anxiety.   - Continue Home regimen  - Duloxetine, Buspar, Xanax and trazodone.       Severe Aortic Stenosis  - monitor volume status  - monitor for cardiac decompensation    DVT prophylaxis  SCDs only in setting of acute blood loss    Diet ADULT DIET; Clear Liquid    DVT Prophylaxis [] Lovenox, []  Heparin, [x] SCDs, [] Ambulation  [] Long term AC   GI Prophylaxis [x] PPI,  [] H2 Blocker,  [] Carafate,  [] Diet,  [] No GI prophylaxis, N/A: patient is not under significant medical stress, non-ICU or is receiving a diet/tube feeds   Code Status Full Code Full CODE   Disposition Patient requires continued admission due to GI bleed. Discharge Plan: back to home / [facility] / when able / discharge to hospice after seen by case management team. Patient plans to return home upon discharge   MDM [] Low, [] Moderate,[x]  High  Patient's risk as above due to:      [x] One or more chronic illnesses with mild exacerbation progression      [x] Two or more stable chronic illnesses      [] Undiagnosed new problem with uncertain prognosis      [x] Elective major surgery      [x]Prescription drug management       History of Present Illness:     Chief Complaint: GI bleed  Saige Quispe is a 78 y.o.  female  who presents with anemia. Patient seen and examined bedside in room 3108. Blood is transfusing. EGD showed small esophageal varices & mild portal hypertensive gastropathy. Cardiology consulted to clear for colonoscopy. Plan for slow bowel prep & colonoscopy. Hg continues to trend down. Ten point ROS reviewed negative, unless as noted above    Objective: Intake/Output Summary (Last 24 hours) at 8/4/2021 1516  Last data filed at 8/4/2021 3041  Gross per 24 hour   Intake 380 ml   Output --   Net 380 ml      Vitals:   Vitals:    08/04/21 1311   BP: 119/63   Pulse: 123   Resp: 24   Temp: 98.2 °F (36.8 °C)   SpO2: 92%     Physical Exam:   GEN Awake female, sitting upright in bed in no apparent distress. Appears given age. EYES Pupils are equally round. No scleral erythema, discharge, or conjunctivitis. HENT Mucous membranes are moist. Oral pharynx without exudates, no evidence of thrush.   NECK Supple, no apparent thyromegaly or masses. RESP Clear to auscultation, no wheezes, rales or rhonchi. Symmetric chest movement while on room air. CARDIO/VASC S1/S2 auscultated. Regular rate without appreciable murmurs, rubs, or gallops. No JVD or carotid bruits. Peripheral pulses equal bilaterally and palpable. No peripheral edema. GI Abdomen is soft without significant tenderness, masses, or guarding. Bowel sounds are normoactive. Rectal exam deferred.  No costovertebral angle tenderness. Normal appearing external genitalia. Lewis catheter is not present. HEME/LYMPH No palpable cervical lymphadenopathy and no hepatosplenomegaly. No petechiae or ecchymoses. MSK No gross joint deformities. SKIN Normal coloration, warm, dry. NEURO Cranial nerves appear grossly intact, normal speech, no lateralizing weakness. PSYCH Awake, alert, oriented x 4. Affect appropriate. Past Medical History:      Past Medical History:   Diagnosis Date    Anxiety     Cellulitis     foot.  Cirrhosis ([facility] 02/27/2020    seen on CT scan    Closed compression fracture of L2 lumbar vertebra, [facility] encounter ([facility] 02/29/2020    seen on CT    Constipation     Depression     Diabetes mellitus ([facility]     Hyperlipidemia     Hypertension     Insomnia     Rosacea     Sinus tachycardia      PSHX:  has a past surgical history that includes back surgery; Hysterectomy; Toe Surgery; Upper gastrointestinal endoscopy (N/A, 5/26/2020); and Upper gastrointestinal endoscopy (N/A, 8/3/2021). Allergies: Allergies   Allergen Reactions    Chocolate Other (See Comments)     Throbbing headache    Nuts [Peanut-Containing Drug Products] Other (See Comments)     \"makes my nose run and like i have a bad cold\"       FAM HX: family history is not on file.   Soc HX:   Social History     Socioeconomic History    Marital status:      Spouse name: None    Number of children: None    Years of education: None    Highest education level: None   Occupational History    PRN  dextrose, 100 mL/hr, PRN  promethazine, 12.5 mg, Q6H PRN   Or  promethazine, 12.5 mg, Q6H PRN  sodium chloride flush, 10 mL, ONCE PRN          Electronically signed by Rere Power DO on 8/4/2021 at 3:16 PM

## 2021-08-04 NOTE — CARE COORDINATION
Pt is confused. Called daughter/Seema for d/c planning. She states that pt lives alone and son checks on her 1 to 2 times a day. Pt has a PCP that comes to her home. She has insurance, transportation, and is able to afford her medication. She states that she has a nurse visit once a week and shower aide 2 times a week. She cannot remember the name of the [address]. Pt has 2 walkers, a cane, shower seat, and grab bars. Discussed the PT/OT recommendations for SNF with daughter and she is agreeable for pt to go to a SNF. She states that pt has been to [facility] before and they really liked it. Referral made to [facility] [place] [id number]. Pt will not require a pre-cert. She will review clinicals and will notify CM if the will be able to accept pt or not. Pt will need a rapid Covid.  TE

## 2021-08-04 NOTE — DISCHARGE INSTR - COC
Continuity of Care Form    Patient Name: Sara Chakraborty   :  1940  MRN:  [id number]    Admit date:  2021  Discharge date:  ***    Code Status Order: Full Code   Advance Directives:      Admitting Physician:  Leilani Asif MD  PCP: Trina Adams MD    Discharging Nurse: [facility] Unit/Room#: 56-A  Discharging Unit Phone Number: ***    Emergency Contact:   Extended Emergency Contact Information  Primary Emergency Contact: [address] [place] [address] Phone: [phone]  Relation: Child  Secondary Emergency Contact: Marleen Jamil  Home Phone: [phone]  Relation: Grandchild    Past Surgical History:  Past Surgical History:   Procedure Laterality Date    BACK SURGERY      HYSTERECTOMY      TOE SURGERY      UPPER GASTROINTESTINAL ENDOSCOPY N/A 2020    EGD DILATION BALLOON performed by Dion Bob MD at [place] [address] N/A 8/3/2021    EGD DIAGNOSTIC ONLY RECTAL EXAM PER DR. Lisa Freeman performed by Dion Bob MD at [facility] ENDOSCOPY       Immunization History:   Immunization History   Administered Date(s) Administered    Hepatitis B Ped/Adol (Engerix-B, Recombivax HB) 2020       Active Problems:  Patient Active Problem List   Diagnosis Code    Dizziness R42    Difficulty swallowing R13.10    PVC (premature ventricular contraction) I49.3    Diabetes mellitus ([facility] E11.9    Hypertension I10    Hyperlipemia E78.5    Moderate episode of recurrent major depressive disorder (HCC) F33.1    Pain of right lower extremity M79.604    Hematoma of right thigh S70.11XA    Primary osteoarthritis of both knees M17.0    Uncontrolled pain R52    Chest pain R07.9    Generalized anxiety disorder F41.1    Psychophysiological insomnia F51.04    Severe aortic stenosis I35.0    Moderate malnutrition (HCC) E44.0    GI bleed K92.2    Fall W19. XXXA    Cirrhosis of liver without ascites ([facility] K74.60       Isolation/Infection:   Isolation            No Isolation Patient Infection Status       None to display            Nurse Assessment:  Last Vital Signs: /76   Pulse 114   Temp 98 °F (36.7 °C) (Oral)   Resp 16   Ht 5' 4\" (1.626 m)   Wt 134 lb 6.4 oz (61 kg)   SpO2 95%   BMI 23.07 kg/m²     Last documented pain score (0-10 scale): Pain Level: 0  Last Weight:   Wt Readings from Last 1 Encounters:   08/03/21 134 lb 6.4 oz (61 kg)     Mental Status:  oriented and alert    IV Access:  - None    Nursing Mobility/ADLs:  Walking   Assisted  Transfer  Dependent  Bathing  Assisted  Dressing  Dependent  Toileting  Assisted  Feeding  Independent  Med Admin  Assisted  Med Delivery   whole    Wound Care Documentation and Therapy:  Wound 03/16/17 Right medial knee blister cluster (Active)   Number of days: 8530       Wound 08/03/21 Buttocks Left (Active)   Wound Image   08/03/21 1400   Wound Etiology Pressure Stage  2 08/04/21 0200   Dressing Status Clean;Dry; Intact 08/04/21 0200   Wound Cleansed Cleansed with saline 08/03/21 1400   Wound Length (cm) 0.5 cm 08/03/21 1400   Wound Width (cm) 0.3 cm 08/03/21 1400   Wound Depth (cm) 0.1 cm 08/03/21 1400   Wound Surface Area (cm^2) 0.15 cm^2 08/03/21 1400   Wound Volume (cm^3) 0.015 cm^3 08/03/21 1400   Distance Tunneling (cm) 0 cm 08/03/21 1400   Tunneling Position ___ O'Clock 0 08/03/21 1400   Undermining Starts ___ O'Clock 0 08/03/21 1400   Undermining Ends___ O'Clock 0 08/03/21 1400   Undermining Maxium Distance (cm) 0 08/03/21 1400   Wound Assessment Pink/red 08/03/21 1400   Drainage Amount Scant 08/03/21 1400   Drainage Description Serous 08/03/21 1400   Odor None 08/03/21 1400   Brianna-wound Assessment Blanchable erythema 08/03/21 1400   Margins Defined edges 08/03/21 1400   Wound Thickness Description not for Pressure Injury Partial thickness 08/03/21 1400   Number of days: 0        Elimination:  Continence:    Bowel: Yes  Bladder: Yes  Urinary Catheter: None   Colostomy/Ileostomy/Ileal Conduit: No       Date of Last BM: 8/6/21  ***    Intake/Output Summary (Last 24 hours) at 8/4/2021 1029  Last data filed at 8/4/2021 2748  Gross per 24 hour   Intake 380 ml   Output --   Net 380 ml     I/O last 3 completed shifts: In: 65 [P.O.:360; I.V.:320]  Out: -     Safety Concerns: At Risk for Falls    Impairments/Disabilities:      None    Patient's personal belongings (please select all that are sent with patient):  Dentures upper    RN SIGNATURE:  {Esignature:[id number]}    CASE MANAGEMENT/SOCIAL WORK SECTION    Inpatient Status Date: ***    Readmission Risk Assessment Score:  Readmission Risk              Risk of Unplanned Readmission:  13           Discharging to Facility/ Agency   Name: Devin Castro  Address: [address] [facility]  Phone: [phone]  Fax: [phone]    Dialysis Facility (if applicable)   Name:  Address:  Dialysis Schedule:  Phone:  Fax:      PHYSICIAN SECTION    Nutrition Therapy:  Current Nutrition Therapy:   - Oral Diet:  Diabetic    Routes of Feeding: Oral  Liquids: Thin Liquids  Daily Fluid Restriction: no  Last Modified Barium Swallow with Video (Video Swallowing Test): not done    Treatments at the Time of Hospital Discharge:   Respiratory Treatments: n/a  Oxygen Therapy:  is not on home oxygen therapy. Ventilator:    - No ventilator support    Rehab Therapies: Physical Therapy, Occupational Therapy, and Speech/Language Therapy  Weight Bearing Status/Restrictions: No weight bearing restirctions  Other Medical Equipment (for information only, NOT a DME order):  per PT/OT  Other Treatments: ***      Wound care: Left buttock cleanse with NS, apply Mepilex silicone foam border change every 3 days and prn. Pt is at mild risk for skin breakdown MOOKIE Izaguirre. Follow madison orders.         Prognosis: Good    Condition at Discharge: Stable    Rehab Potential (if transferring to Rehab): Good    Recommended Labs or Other Treatments After Discharge: CBC and BMP in 1 week and in 2 weeks    Physician Certification: I certify the above information and transfer of Ede [place]  is necessary for the continuing treatment of the diagnosis listed and that she requires [facility] for less 30 days.      Update Admission H&P: No change in H&P    PHYSICIAN SIGNATURE:  Electronically signed by Diana Dawn MD on 8/7/21 at 8:09 PM EDT

## 2021-08-04 NOTE — ANESTHESIA PRE PROCEDURE
Department of Anesthesiology  Preprocedure Note       Name:  Timo Giron   Age:  78 y.o.  :  1940                                          MRN:  [id number]         Date:  2021      Surgeon: Deanna Maya):  Pardeep Hagen MD    Procedure: Procedure(s):  COLONOSCOPY DIAGNOSTIC    Medications prior to admission:   Prior to Admission medications    Medication Sig Start Date End Date Taking? Authorizing Provider   ALPRAZolam Verlene Gabriela) 0.5 MG tablet Take 0.5 mg by mouth 2 times daily as needed.  7/15/21   Historical Provider, MD   DULoxetine (CYMBALTA) 60 MG extended release capsule Take 60 mg by mouth daily 21   Historical Provider, MD   furosemide (LASIX) 20 MG tablet Take 20 mg by mouth daily as needed 21   Historical Provider, MD   metFORMIN (GLUCOPHAGE) 500 MG tablet Take 500 mg by mouth 2 times daily 21   Historical Provider, MD   busPIRone (BUSPAR) 15 MG tablet Take 15 mg by mouth 3 times daily  Patient taking differently: Take 15 mg by mouth 2 times daily  20   Mishel Gonzalez MD   traZODone (DESYREL) 50 MG tablet Take 1 tablet by mouth nightly 20   Mishel Gonzalez MD   aspirin 81 MG EC tablet Take 81 mg by mouth daily    Historical Provider, MD       Current medications:    Current Facility-Administered Medications   Medication Dose Route Frequency Provider Last Rate Last Admin    polyethylene glycol (GoLYTELY) solution 2,000 mL  2,000 mL Oral Once Pardeep Hagen MD        magnesium citrate solution 296 mL  296 mL Oral Once Pardeep Hagen MD        0.9 % sodium chloride infusion   Intravenous PRN Pardeep Hagen MD        0.9 % sodium chloride infusion   Intravenous PRN Pardeep Hagen MD        pantoprazole (PROTONIX) injection 40 mg  40 mg Intravenous Q12H Pardeep Hagen MD   40 mg at 21 0019    sodium chloride flush 0.9 % injection 5-40 mL  5-40 mL Intravenous 2 times per day Pardeep Hagen MD   10 mL at 21    sodium chloride flush 0.9 % injection 5-40 mL  5-40 mL Intravenous PRN Verena Collado MD        0.9 % sodium chloride infusion  25 mL Intravenous PRN Verena Collado MD        acetaminophen (TYLENOL) tablet 650 mg  650 mg Oral Q6H PRN Verena Collado MD        Or   [facility] acetaminophen (TYLENOL) suppository 650 mg  650 mg Rectal Q6H PRN Verena Collado MD        glucose (GLUTOSE) 40 % oral gel 15 g  15 g Oral PRN Verena Collado MD        dextrose 50 % IV solution  12.5 g Intravenous PRN Verena Collado MD        glucagon (rDNA) injection 1 mg  1 mg Intramuscular PRN Verena Collado MD        dextrose 5 % solution  100 mL/hr Intravenous PRN Verena Collado MD        insulin lispro (HUMALOG) injection vial 0-6 Units  0-6 Units Subcutaneous TID WC Verena Collado MD   2 Units at 08/03/21 1706    insulin lispro (HUMALOG) injection vial 0-3 Units  0-3 Units Subcutaneous Nightly Verena Collado MD   1 Units at 08/03/21 2111    0.9 % sodium chloride infusion   Intravenous Continuous Verena Collado  mL/hr at 08/02/21 1542 New Bag at 08/02/21 1542    promethazine (PHENERGAN) tablet 12.5 mg  12.5 mg Oral Q6H PRN Verena Collado MD        Or   [facility] promethazine (PHENERGAN) injection 12.5 mg  12.5 mg Intramuscular Q6H PRN Verena Collado MD        sodium chloride flush 0.9 % injection 10 mL  10 mL Intravenous ONCE PRN Verena Collado MD        ALPRAZolam [facility]) tablet 0.5 mg  0.5 mg Oral TID Verena Collado MD   0.5 mg at 08/03/21 2110    DULoxetine (CYMBALTA) extended release capsule 60 mg  60 mg Oral Daily Verena Collado MD   60 mg at 08/03/21 1056    traZODone (DESYREL) tablet 50 mg  50 mg Oral Nightly Verena Collado MD   50 mg at 08/03/21 2109    busPIRone (BUSPAR) tablet 15 mg  15 mg Oral TID Verena Collado MD   15 mg at 08/03/21 2110       Allergies:     Allergies   Allergen Reactions    Chocolate Other (See Comments)     Throbbing headache    Nuts [Peanut-Containing Drug Products] Other (See Comments)     \"makes my nose run and like i have a bad cold\"       Problem List:    Patient Active Problem List   Diagnosis Code    Dizziness R42    Difficulty swallowing R13.10    PVC (premature ventricular contraction) I49.3    Diabetes mellitus ([facility] E11.9    Hypertension I10    Hyperlipemia E78.5    Moderate episode of recurrent major depressive disorder ([facility] F33.1    Pain of right lower extremity M79.604    Hematoma of right thigh S70.11XA    Primary osteoarthritis of both knees M17.0    Uncontrolled pain R52    Chest pain R07.9    Generalized anxiety disorder F41.1    Psychophysiological insomnia F51.04    Severe aortic stenosis I35.0    Moderate malnutrition (HCC) E44.0    GI bleed K92.2    Fall W19. Kaylee Ganey    Cirrhosis of liver without ascites ([facility] K74.60       Past Medical History:        Diagnosis Date    Anxiety     Cellulitis     foot.  Cirrhosis ([facility] 02/27/2020    seen on CT scan    Closed compression fracture of L2 lumbar vertebra, initial encounter ([facility] 02/29/2020    seen on CT    Constipation     Depression     Diabetes mellitus ([facility] [facility]     Hyperlipidemia     Hypertension     Insomnia     Rosacea     Sinus tachycardia        Past Surgical History:        Procedure Laterality Date    BACK SURGERY      HYSTERECTOMY      TOE SURGERY      UPPER GASTROINTESTINAL ENDOSCOPY N/A 5/26/2020    EGD DILATION BALLOON performed by Monda Koyanagi, MD at [address] N/A 8/3/2021    EGD DIAGNOSTIC ONLY RECTAL EXAM PER DR. Jose Nava performed by Monda Koyanagi, MD at [facility] ENDOSCOPY       Social History:    Social History     Tobacco Use    Smoking status: Never Smoker    Smokeless tobacco: Never Used   Substance Use Topics    Alcohol use:  No                                Counseling given: Not Answered      Vital Signs (Current):   Vitals:    08/03/21 2115 08/04/21 0000 08/04/21 0200 08/04/21 0400   BP: 122/69 104/70 (!) 101/52 118/76   Pulse: 118 115 119 114   Resp: 20 19 22 16   Temp: 36.7 Value Date    COVID19 NOT DETECTED 08/02/2021           Anesthesia Evaluation  Patient summary reviewed  Airway:         Dental:          Pulmonary:                              Cardiovascular:    (+) hypertension:, hyperlipidemia               ROS comment: PVC  ST     Neuro/Psych:   (+) psychiatric history:depression/anxiety              ROS comment: Closed compression fracture of L2 lumbar vertebra, initial encounter ([facility] GI/Hepatic/Renal:   (+) liver disease (Cirrhosis):,          ROS comment: Difficulty swallowing. Endo/Other:    (+) Diabetes, . Abdominal:             Vascular: Other Findings:             Anesthesia Plan      MAC     ASA 2       Induction: intravenous. MIPS: prophylactic pharmacologic antiemetic agents not administered perioperatively for documented reasons. Pre Anesthesia Assessment complete.  Chart reviewed on 8/4/2021      WES Castellanos - CRNA   8/4/2021

## 2021-08-04 NOTE — PROGRESS NOTES
Cardiology Progress Note     Today's Plan cpm    Admit Date:  8/2/2021    Consult reason/ Seen today for: gi bleeding- pre op    Subjective and  Overnight Events:  Alert- confused to place and time-     For colonoscopy in am    Assessment / Plan / Recommendation:     1. GI bleeding- hemoglobin 6.6 on admission - EGD no fresh blood noted to have small esophageal varices -had colonoscopy this am-   2. H/o severe AS- AVR 0.94 mm sq with mean gradient of 45 mmHg - for  TAVR once stable         History of Presenting Illness:    Chief complain on admission : 78 y. o.year old who is admitted for  Chief Complaint   Patient presents with   Nubia Stuartkristie     yesterday at 7 pm, c/o worsening chronic back pain        Past medical history:    has a past medical history of Anxiety, Cellulitis, Cirrhosis ([facility] [address] Closed compression fracture of L2 lumbar vertebra, initial encounter ([facility] [address] Constipation, Depression, Diabetes mellitus ([facility] [address] Hyperlipidemia, Hypertension, Insomnia, Rosacea, and Sinus tachycardia. Past surgical history:   has a past surgical history that includes back surgery; Hysterectomy; Toe Surgery; Upper gastrointestinal endoscopy (N/A, 5/26/2020); and Upper gastrointestinal endoscopy (N/A, 8/3/2021). Social History:   reports that she has never smoked. She has never used smokeless tobacco. She reports that she does not drink alcohol and does not use drugs. Family history:  family history is not on file.     Allergies   Allergen Reactions    Chocolate Other (See Comments)     Throbbing headache    Nuts [Peanut-Containing Drug Products] Other (See Comments)     \"makes my nose run and like i have a bad cold\"       Review of Systems:   All 14 systems were reviewed and are negative  Except for the positive findings which are documented     /76   Pulse 114   Temp 98 °F (36.7 °C) (Oral)   Resp 16   Ht 5' 4\" (1.626 m)   Wt 134 lb 6.4 oz (61 kg)   SpO2 95%   BMI 23.07 kg/m²       Intake/Output Summary (Last 24 hours) at 8/4/2021 1102  Last data filed at 8/4/2021 3712  Gross per 24 hour   Intake 380 ml   Output --   Net 380 ml       Physical Exam:  Physical Exam  HENT:      Head: Normocephalic and atraumatic. Cardiovascular:      Rate and Rhythm: Tachycardia present. Heart sounds: Murmur heard. Pulmonary:      Breath sounds: Normal breath sounds. Abdominal:      Palpations: Abdomen is soft. Musculoskeletal:      Right lower leg: No edema. Left lower leg: No edema. Skin:     General: Skin is warm and dry. Neurological:      Mental Status: She is alert. She is disoriented. Medications:    polyethylene glycol  2,000 mL Oral Once    magnesium citrate  296 mL Oral Once    pantoprazole  40 mg Intravenous Q12H    sodium chloride flush  5-40 mL Intravenous 2 times per day    insulin lispro  0-6 Units Subcutaneous TID WC    insulin lispro  0-3 Units Subcutaneous Nightly    ALPRAZolam  0.5 mg Oral TID    DULoxetine  60 mg Oral Daily    traZODone  50 mg Oral Nightly    busPIRone  15 mg Oral TID      sodium chloride      sodium chloride      sodium chloride      dextrose      sodium chloride 125 mL/hr at 08/02/21 1542     sodium chloride, sodium chloride, sodium chloride flush, sodium chloride, acetaminophen **OR** acetaminophen, glucose, dextrose, glucagon (rDNA), dextrose, promethazine **OR** promethazine, sodium chloride flush    Lab Data:  CBC:   Recent Labs     08/02/21  1120 08/02/21  1746 08/03/21  0234 08/03/21  0936 08/03/21  1332 08/03/21  1852 08/04/21  0421   WBC 14.8*  --  11.3*  --   --   --   --    HGB 6.6*   < > 7.1*   < > 7.1* 7.6* 7.0*   HCT 22.0*   < > 23.7*   < > 23.9* 25.2* 24.5*   MCV 88.0  --  86.8  --   --   --   --      --  232  --   --   --   --     < > = values in this interval not displayed.      BMP:   Recent Labs     08/02/21  1120 08/03/21  0234 08/04/21  0421    134* 135   K 4.5 4.0 3.8    103 103   CO2 25 26 25   BUN 28* 22 19   CREATININE 0.6 0.7 0.5*     PT/INR:   Recent Labs     08/02/21  1315   PROTIME 13.9   INR 1.08     BNP:    Recent Labs     08/02/21  1120   PROBNP 197.5     TROPONIN:   Recent Labs     08/02/21  1120   TROPONINT <0.010              Impression:  Active Problems:    GI bleed    Fall    Cirrhosis of liver without ascites (HCC)  Resolved Problems:    * No resolved hospital problems. *       All labs, medications and tests reviewed by myself, continue all other medications of all above medical condition listed as is except for changes mentioned above. Thank you   Please call with questions. Electronically signed by WES Sawyer CNP on 8/4/2021 at 11:02 AM  I have seen ,spoken to  and examined this patient personally, independently of the nurse practitioner. I have reviewed the hospital care given to date and reviewed all pertinent labs and imaging. The plan was developed mutually at the time of the visit with the patient,  NP  and myself. I have spoken with patient, nursing staff and provided written and verbal instructions . The above note has been reviewed and I agree with the assessment, diagnosis, and treatment plan with changes made by me as follows     CARDIOLOGY ATTENDING ADDENDUM    HPI:  I have reviewed the above HPI  And agree with above   Tracie Tello is a 78 y. o.year old who and presents with had concerns including Fall (yesterday at 7 pm, c/o worsening chronic back pain).   Chief Complaint   Patient presents with   [facility]     yesterday at 7 pm, c/o worsening chronic back pain     Interval history:  Has gi bleed    Physical Exam:  General:  Awake, alert, NAD  Head:normal  Eye:normal  Neck:  No JVD   Chest:  Clear to auscultation, respiration easy  Cardiovascular:  RRR S1S2  Abdomen:   nontender  Extremities:  tr edema  Pulses; palpable  Neuro: grossly normal      MEDICAL DECISION MAKING;    I agree with the above plan, which was planned by myself and discussed with NP.  tavr once gi issue is resolved        Wesly Camara MD [facility] [place] [facility]

## 2021-08-05 ENCOUNTER — APPOINTMENT (OUTPATIENT)
Dept: ULTRASOUND IMAGING | Age: 81
DRG: 378 | End: 2021-08-05
Payer: MEDICARE

## 2021-08-05 ENCOUNTER — ANESTHESIA (OUTPATIENT)
Dept: ENDOSCOPY | Age: 81
DRG: 378 | End: 2021-08-05
Payer: MEDICARE

## 2021-08-05 VITALS
OXYGEN SATURATION: 99 % | SYSTOLIC BLOOD PRESSURE: 111 MMHG | RESPIRATION RATE: 20 BRPM | DIASTOLIC BLOOD PRESSURE: 63 MMHG

## 2021-08-05 LAB
ABO/RH: NORMAL
ANION GAP SERPL CALCULATED.3IONS-SCNC: 6 MMOL/L (ref 4–16)
ANTIBODY SCREEN: NEGATIVE
BUN BLDV-MCNC: 12 MG/DL (ref 6–23)
CALCIUM SERPL-MCNC: 7.9 MG/DL (ref 8.3–10.6)
CHLORIDE BLD-SCNC: 102 MMOL/L (ref 99–110)
CO2: 27 MMOL/L (ref 21–32)
COMPONENT: NORMAL
COMPONENT: NORMAL
CREAT SERPL-MCNC: 0.6 MG/DL (ref 0.6–1.1)
CROSSMATCH RESULT: NORMAL
CROSSMATCH RESULT: NORMAL
GFR AFRICAN AMERICAN: >60 ML/MIN/1.73M2
GFR NON-AFRICAN AMERICAN: >60 ML/MIN/1.73M2
GLUCOSE BLD-MCNC: 172 MG/DL (ref 70–99)
GLUCOSE BLD-MCNC: 186 MG/DL (ref 70–99)
GLUCOSE BLD-MCNC: 205 MG/DL (ref 70–99)
GLUCOSE BLD-MCNC: 212 MG/DL (ref 70–99)
GLUCOSE BLD-MCNC: 373 MG/DL (ref 70–99)
HCT VFR BLD CALC: 28.4 % (ref 37–47)
HCT VFR BLD CALC: 29 % (ref 37–47)
HCT VFR BLD CALC: 30.2 % (ref 37–47)
HEMOGLOBIN: 8.6 GM/DL (ref 12.5–16)
HEMOGLOBIN: 8.9 GM/DL (ref 12.5–16)
HEMOGLOBIN: 9 GM/DL (ref 12.5–16)
POTASSIUM SERPL-SCNC: 4.3 MMOL/L (ref 3.5–5.1)
SODIUM BLD-SCNC: 135 MMOL/L (ref 135–145)
STATUS: NORMAL
STATUS: NORMAL
TRANSFUSION STATUS: NORMAL
TRANSFUSION STATUS: NORMAL
UNIT DIVISION: 0
UNIT DIVISION: 0
UNIT NUMBER: NORMAL
UNIT NUMBER: NORMAL

## 2021-08-05 PROCEDURE — 45382 COLONOSCOPY W/CONTROL BLEED: CPT | Performed by: SPECIALIST

## 2021-08-05 PROCEDURE — 3700000000 HC ANESTHESIA ATTENDED CARE: Performed by: SPECIALIST

## 2021-08-05 PROCEDURE — 3609009900 HC COLONOSCOPY W/CONTROL BLEEDING ANY METHOD: Performed by: SPECIALIST

## 2021-08-05 PROCEDURE — 2709999900 HC NON-CHARGEABLE SUPPLY: Performed by: SPECIALIST

## 2021-08-05 PROCEDURE — 2580000003 HC RX 258: Performed by: NURSE ANESTHETIST, CERTIFIED REGISTERED

## 2021-08-05 PROCEDURE — 6370000000 HC RX 637 (ALT 250 FOR IP): Performed by: HOSPITALIST

## 2021-08-05 PROCEDURE — 0DBK8ZX EXCISION OF ASCENDING COLON, VIA NATURAL OR ARTIFICIAL OPENING ENDOSCOPIC, DIAGNOSTIC: ICD-10-PCS | Performed by: SPECIALIST

## 2021-08-05 PROCEDURE — 85014 HEMATOCRIT: CPT

## 2021-08-05 PROCEDURE — 99232 SBSQ HOSP IP/OBS MODERATE 35: CPT | Performed by: INTERNAL MEDICINE

## 2021-08-05 PROCEDURE — 80048 BASIC METABOLIC PNL TOTAL CA: CPT

## 2021-08-05 PROCEDURE — 85018 HEMOGLOBIN: CPT

## 2021-08-05 PROCEDURE — 2580000003 HC RX 258: Performed by: SPECIALIST

## 2021-08-05 PROCEDURE — 0W3P8ZZ CONTROL BLEEDING IN GASTROINTESTINAL TRACT, VIA NATURAL OR ARTIFICIAL OPENING ENDOSCOPIC: ICD-10-PCS | Performed by: SPECIALIST

## 2021-08-05 PROCEDURE — 82962 GLUCOSE BLOOD TEST: CPT

## 2021-08-05 PROCEDURE — 36415 COLL VENOUS BLD VENIPUNCTURE: CPT

## 2021-08-05 PROCEDURE — 2720000010 HC SURG SUPPLY STERILE: Performed by: SPECIALIST

## 2021-08-05 PROCEDURE — 93880 EXTRACRANIAL BILAT STUDY: CPT

## 2021-08-05 PROCEDURE — 6370000000 HC RX 637 (ALT 250 FOR IP): Performed by: SPECIALIST

## 2021-08-05 PROCEDURE — C9113 INJ PANTOPRAZOLE SODIUM, VIA: HCPCS | Performed by: SPECIALIST

## 2021-08-05 PROCEDURE — 6360000002 HC RX W HCPCS: Performed by: SPECIALIST

## 2021-08-05 PROCEDURE — 94761 N-INVAS EAR/PLS OXIMETRY MLT: CPT

## 2021-08-05 PROCEDURE — 2140000000 HC CCU INTERMEDIATE R&B

## 2021-08-05 PROCEDURE — 45385 COLONOSCOPY W/LESION REMOVAL: CPT | Performed by: SPECIALIST

## 2021-08-05 PROCEDURE — 88305 TISSUE EXAM BY PATHOLOGIST: CPT | Performed by: PATHOLOGY

## 2021-08-05 PROCEDURE — APPSS30 APP SPLIT SHARED TIME 16-30 MINUTES: Performed by: NURSE PRACTITIONER

## 2021-08-05 PROCEDURE — 3700000001 HC ADD 15 MINUTES (ANESTHESIA): Performed by: SPECIALIST

## 2021-08-05 PROCEDURE — 2500000003 HC RX 250 WO HCPCS: Performed by: NURSE ANESTHETIST, CERTIFIED REGISTERED

## 2021-08-05 PROCEDURE — 0DBN8ZX EXCISION OF SIGMOID COLON, VIA NATURAL OR ARTIFICIAL OPENING ENDOSCOPIC, DIAGNOSTIC: ICD-10-PCS | Performed by: SPECIALIST

## 2021-08-05 PROCEDURE — 6370000000 HC RX 637 (ALT 250 FOR IP): Performed by: PSYCHIATRY & NEUROLOGY

## 2021-08-05 PROCEDURE — 6360000002 HC RX W HCPCS: Performed by: NURSE ANESTHETIST, CERTIFIED REGISTERED

## 2021-08-05 RX ORDER — BUSPIRONE HYDROCHLORIDE 10 MG/1
20 TABLET ORAL 3 TIMES DAILY
Status: DISCONTINUED | OUTPATIENT
Start: 2021-08-06 | End: 2021-08-08 | Stop reason: HOSPADM

## 2021-08-05 RX ORDER — LIDOCAINE HYDROCHLORIDE 20 MG/ML
INJECTION, SOLUTION INFILTRATION; PERINEURAL PRN
Status: DISCONTINUED | OUTPATIENT
Start: 2021-08-05 | End: 2021-08-05 | Stop reason: SDUPTHER

## 2021-08-05 RX ORDER — CLOPIDOGREL BISULFATE 75 MG/1
75 TABLET ORAL DAILY
Status: DISCONTINUED | OUTPATIENT
Start: 2021-08-05 | End: 2021-08-08 | Stop reason: HOSPADM

## 2021-08-05 RX ORDER — OYSTER SHELL CALCIUM WITH VITAMIN D 500; 200 MG/1; [IU]/1
1 TABLET, FILM COATED ORAL 3 TIMES DAILY
Status: DISCONTINUED | OUTPATIENT
Start: 2021-08-05 | End: 2021-08-05 | Stop reason: SDUPTHER

## 2021-08-05 RX ORDER — SODIUM PHOSPHATE, DIBASIC AND SODIUM PHOSPHATE, MONOBASIC 7; 19 G/133ML; G/133ML
1 ENEMA RECTAL ONCE
Status: COMPLETED | OUTPATIENT
Start: 2021-08-05 | End: 2021-08-05

## 2021-08-05 RX ORDER — PROPOFOL 10 MG/ML
INJECTION, EMULSION INTRAVENOUS PRN
Status: DISCONTINUED | OUTPATIENT
Start: 2021-08-05 | End: 2021-08-05 | Stop reason: SDUPTHER

## 2021-08-05 RX ORDER — SODIUM CHLORIDE, SODIUM LACTATE, POTASSIUM CHLORIDE, CALCIUM CHLORIDE 600; 310; 30; 20 MG/100ML; MG/100ML; MG/100ML; MG/100ML
INJECTION, SOLUTION INTRAVENOUS CONTINUOUS
Status: DISCONTINUED | OUTPATIENT
Start: 2021-08-05 | End: 2021-08-05

## 2021-08-05 RX ORDER — DONEPEZIL HYDROCHLORIDE 5 MG/1
5 TABLET, FILM COATED ORAL NIGHTLY
Status: DISCONTINUED | OUTPATIENT
Start: 2021-08-05 | End: 2021-08-08 | Stop reason: HOSPADM

## 2021-08-05 RX ORDER — SODIUM CHLORIDE, SODIUM LACTATE, POTASSIUM CHLORIDE, CALCIUM CHLORIDE 600; 310; 30; 20 MG/100ML; MG/100ML; MG/100ML; MG/100ML
INJECTION, SOLUTION INTRAVENOUS CONTINUOUS PRN
Status: DISCONTINUED | OUTPATIENT
Start: 2021-08-05 | End: 2021-08-05 | Stop reason: SDUPTHER

## 2021-08-05 RX ORDER — ASPIRIN 81 MG/1
81 TABLET, CHEWABLE ORAL DAILY
Status: DISCONTINUED | OUTPATIENT
Start: 2021-08-05 | End: 2021-08-05

## 2021-08-05 RX ADMIN — PROPOFOL 30 MG: 10 INJECTION, EMULSION INTRAVENOUS at 07:26

## 2021-08-05 RX ADMIN — SODIUM CHLORIDE, PRESERVATIVE FREE 10 ML: 5 INJECTION INTRAVENOUS at 12:14

## 2021-08-05 RX ADMIN — DULOXETINE HYDROCHLORIDE 60 MG: 30 CAPSULE, DELAYED RELEASE ORAL at 10:39

## 2021-08-05 RX ADMIN — INSULIN LISPRO 5 UNITS: 100 INJECTION, SOLUTION INTRAVENOUS; SUBCUTANEOUS at 11:45

## 2021-08-05 RX ADMIN — PROPOFOL 30 MG: 10 INJECTION, EMULSION INTRAVENOUS at 07:15

## 2021-08-05 RX ADMIN — SODIUM CHLORIDE, POTASSIUM CHLORIDE, SODIUM LACTATE AND CALCIUM CHLORIDE: 600; 310; 30; 20 INJECTION, SOLUTION INTRAVENOUS at 07:11

## 2021-08-05 RX ADMIN — PROPOFOL 30 MG: 10 INJECTION, EMULSION INTRAVENOUS at 07:21

## 2021-08-05 RX ADMIN — SODIUM CHLORIDE, PRESERVATIVE FREE 10 ML: 5 INJECTION INTRAVENOUS at 21:53

## 2021-08-05 RX ADMIN — DONEPEZIL HYDROCHLORIDE 5 MG: 5 TABLET, FILM COATED ORAL at 21:53

## 2021-08-05 RX ADMIN — CLOPIDOGREL BISULFATE 75 MG: 75 TABLET ORAL at 21:53

## 2021-08-05 RX ADMIN — LIDOCAINE HYDROCHLORIDE 60 MG: 20 INJECTION, SOLUTION INFILTRATION; PERINEURAL at 07:15

## 2021-08-05 RX ADMIN — TRAZODONE HYDROCHLORIDE 50 MG: 50 TABLET ORAL at 20:39

## 2021-08-05 RX ADMIN — PHENYLEPHRINE HYDROCHLORIDE 100 MCG: 10 INJECTION INTRAVENOUS at 07:20

## 2021-08-05 RX ADMIN — BUSPIRONE HYDROCHLORIDE 15 MG: 5 TABLET ORAL at 20:39

## 2021-08-05 RX ADMIN — BUSPIRONE HYDROCHLORIDE 15 MG: 5 TABLET ORAL at 10:39

## 2021-08-05 RX ADMIN — Medication 1 TABLET: at 21:53

## 2021-08-05 RX ADMIN — PROPOFOL 30 MG: 10 INJECTION, EMULSION INTRAVENOUS at 07:39

## 2021-08-05 RX ADMIN — BUSPIRONE HYDROCHLORIDE 15 MG: 5 TABLET ORAL at 15:44

## 2021-08-05 RX ADMIN — ALPRAZOLAM 0.5 MG: 0.25 TABLET ORAL at 10:39

## 2021-08-05 RX ADMIN — ALPRAZOLAM 0.5 MG: 0.25 TABLET ORAL at 15:44

## 2021-08-05 RX ADMIN — PHENYLEPHRINE HYDROCHLORIDE 100 MCG: 10 INJECTION INTRAVENOUS at 07:33

## 2021-08-05 RX ADMIN — PROPOFOL 30 MG: 10 INJECTION, EMULSION INTRAVENOUS at 07:32

## 2021-08-05 RX ADMIN — PROPOFOL 30 MG: 10 INJECTION, EMULSION INTRAVENOUS at 07:17

## 2021-08-05 RX ADMIN — SODIUM PHOSPHATE, DIBASIC AND SODIUM PHOSPHATE, MONOBASIC 1 ENEMA: 7; 19 ENEMA RECTAL at 06:22

## 2021-08-05 RX ADMIN — PANTOPRAZOLE SODIUM 40 MG: 40 INJECTION, POWDER, FOR SOLUTION INTRAVENOUS at 01:31

## 2021-08-05 RX ADMIN — PANTOPRAZOLE SODIUM 40 MG: 40 INJECTION, POWDER, FOR SOLUTION INTRAVENOUS at 13:15

## 2021-08-05 ASSESSMENT — PAIN SCALES - GENERAL
PAINLEVEL_OUTOF10: 0

## 2021-08-05 ASSESSMENT — PAIN DESCRIPTION - LOCATION
LOCATION: BACK
LOCATION: BACK

## 2021-08-05 ASSESSMENT — PAIN DESCRIPTION - PAIN TYPE
TYPE: CHRONIC PAIN
TYPE: CHRONIC PAIN

## 2021-08-05 NOTE — ANESTHESIA PRE PROCEDURE
Department of Anesthesiology  Preprocedure Note       Name:  Chris Bateman   Age:  78 y.o.  :  1940                                          MRN:  [id number]         Date:  2021      Surgeon: Rodney Crump):  Guru Mireles MD    Procedure: Procedure(s):  COLONOSCOPY DIAGNOSTIC    Medications prior to admission:   Prior to Admission medications    Medication Sig Start Date End Date Taking? Authorizing Provider   ALPRAZolam Danney Brayden) 0.5 MG tablet Take 0.5 mg by mouth 2 times daily as needed. 7/15/21   Historical Provider, MD   DULoxetine (CYMBALTA) 60 MG extended release capsule Take 60 mg by mouth daily 21   Historical Provider, MD   furosemide (LASIX) 20 MG tablet Take 20 mg by mouth daily as needed 21   Historical Provider, MD   metFORMIN (GLUCOPHAGE) 500 MG tablet Take 500 mg by mouth 2 times daily 21   Historical Provider, MD   busPIRone (BUSPAR) 15 MG tablet Take 15 mg by mouth 3 times daily  Patient taking differently: Take 15 mg by mouth 2 times daily  20   Arin Herzog MD   traZODone (DESYREL) 50 MG tablet Take 1 tablet by mouth nightly 20   Arin Herzog MD   aspirin 81 MG EC tablet Take 81 mg by mouth daily    Historical Provider, MD       Current medications:    No current facility-administered medications for this visit. No current outpatient medications on file.      Facility-Administered Medications Ordered in Other Visits   Medication Dose Route Frequency Provider Last Rate Last Admin    lactated ringers infusion   Intravenous Continuous Nhan Youssef MD        0.9 % sodium chloride infusion   Intravenous PRN Guru Mireles MD        pantoprazole (PROTONIX) injection 40 mg  40 mg Intravenous Q12H Guru Mireles MD   40 mg at 21 0131    sodium chloride flush 0.9 % injection 5-40 mL  5-40 mL Intravenous 2 times per day Guru Mireles MD   10 mL at 21 2151    sodium chloride flush 0.9 % injection 5-40 mL  5-40 mL Intravenous PRN Guru Mireles MD   10 mL at 08/04/21 1613    0.9 % sodium chloride infusion  25 mL Intravenous PRN Nazario Shin MD        acetaminophen (TYLENOL) tablet 650 mg  650 mg Oral Q6H PRN Nazario Shin MD        Or   [facility] acetaminophen (TYLENOL) suppository 650 mg  650 mg Rectal Q6H PRN Nazario Shin MD        glucose (GLUTOSE) 40 % oral gel 15 g  15 g Oral PRN Nazario Shin MD        dextrose 50 % IV solution  12.5 g Intravenous PRN Nazario Shin MD        glucagon (rDNA) injection 1 mg  1 mg Intramuscular PRN Nazario Shin MD        dextrose 5 % solution  100 mL/hr Intravenous PRN Nazario Shin MD        insulin lispro (HUMALOG) injection vial 0-6 Units  0-6 Units Subcutaneous TID WC Nazario Shin MD   3 Units at 08/04/21 1619    insulin lispro (HUMALOG) injection vial 0-3 Units  0-3 Units Subcutaneous Nightly Nazario Shin MD   1 Units at 08/04/21 2144    promethazine (PHENERGAN) tablet 12.5 mg  12.5 mg Oral Q6H PRN Nazario Shin MD        Or   [facility] promethazine (PHENERGAN) injection 12.5 mg  12.5 mg Intramuscular Q6H PRN Nazario Shin MD        sodium chloride flush 0.9 % injection 10 mL  10 mL Intravenous ONCE PRN Nazario Shin MD        ALPRAZolam Matt Landrum) tablet 0.5 mg  0.5 mg Oral TID Nazario Shin MD   0.5 mg at 08/04/21 2145    DULoxetine (CYMBALTA) extended release capsule 60 mg  60 mg Oral Daily Nazario Shin MD   60 mg at 08/04/21 0945    traZODone (DESYREL) tablet 50 mg  50 mg Oral Nightly Nazario Shni MD   50 mg at 08/04/21 2145    busPIRone (BUSPAR) tablet 15 mg  15 mg Oral TID Nazario Shin MD   15 mg at 08/04/21 2145       Allergies:     Allergies   Allergen Reactions    Chocolate Other (See Comments)     Throbbing headache    Nuts [Peanut-Containing Drug Products] Other (See Comments)     \"makes my nose run and like i have a bad cold\"       Problem List:    Patient Active Problem List   Diagnosis Code    Dizziness R42    Difficulty swallowing R13.10    PVC (premature ventricular contraction) I49.3    Diabetes mellitus (HCC) E11.9    Hypertension I10    Hyperlipemia E78.5    Moderate episode of recurrent major depressive disorder (HCC) F33.1    Pain of right lower extremity M79.604    Hematoma of right thigh S70.11XA    Primary osteoarthritis of both knees M17.0    Uncontrolled pain R52    Chest pain R07.9    Generalized anxiety disorder F41.1    Psychophysiological insomnia F51.04    Severe aortic stenosis I35.0    Moderate malnutrition (HCC) E44.0    GI bleed K92.2    Fall W19. Tommy Dick    Cirrhosis of liver without ascites ([facility] [address] K74.60    VHD (valvular heart disease) I38       Past Medical History:        Diagnosis Date    Anxiety     Cellulitis     foot.  Cirrhosis ([facility] [address] 02/27/2020    seen on CT scan    Closed compression fracture of L2 lumbar vertebra, initial encounter ([facility] [address] 02/29/2020    seen on CT    Constipation     Depression     Diabetes mellitus ([facility] [address]     Hyperlipidemia     Hypertension     Insomnia     Rosacea     Sinus tachycardia        Past Surgical History:        Procedure Laterality Date    BACK SURGERY      HYSTERECTOMY      TOE SURGERY      UPPER GASTROINTESTINAL ENDOSCOPY N/A 5/26/2020    EGD DILATION BALLOON performed by Dion Bob MD at [address] [place] [address],[address] N/A 8/3/2021    EGD DIAGNOSTIC ONLY RECTAL EXAM PER DR. Lisa Freeman performed by Dion Bob MD at [facility] ENDOSCOPY       Social History:    Social History     Tobacco Use    Smoking status: Never Smoker    Smokeless tobacco: Never Used   Substance Use Topics    Alcohol use: No                                Counseling given: Not Answered      Vital Signs (Current): There were no vitals filed for this visit.                                            BP Readings from Last 3 Encounters:   08/05/21 108/68   08/03/21 122/72   05/27/20 (!) 140/66       NPO Status: BMI:   Wt Readings from Last 3 Encounters:   08/03/21 134 lb 6.4 oz (61 kg)   08/24/20 133 lb (60.3 kg)   05/27/20 127 lb 11.2 oz (57.9 kg)     There is no height or weight on file to calculate BMI.    CBC:   Lab Results   Component Value Date    WBC 11.3 08/03/2021    RBC 2.73 08/03/2021    HGB 8.6 08/05/2021    HCT 29.0 08/05/2021    MCV 86.8 08/03/2021    RDW 16.4 08/03/2021     08/03/2021       CMP:   Lab Results   Component Value Date     08/05/2021    K 4.3 08/05/2021     08/05/2021    CO2 27 08/05/2021    BUN 12 08/05/2021    CREATININE 0.6 08/05/2021    GFRAA >60 08/05/2021    LABGLOM >60 08/05/2021    GLUCOSE 186 08/05/2021    PROT 5.3 08/03/2021    CALCIUM 7.9 08/05/2021    BILITOT 1.0 08/03/2021    ALKPHOS 126 08/03/2021    AST 37 08/03/2021    ALT 21 08/03/2021       POC Tests:   Recent Labs     08/04/21  2143   POCGLU 181*       Coags:   Lab Results   Component Value Date    PROTIME 13.9 08/02/2021    INR 1.08 08/02/2021    APTT 27.0 08/02/2021       HCG (If Applicable): No results found for: PREGTESTUR, PREGSERUM, HCG, HCGQUANT     ABGs:   Lab Results   Component Value Date    PO2ART 71 02/26/2020    GRG2JWN 38.0 02/26/2020    GIY5GJE 27.7 02/26/2020        Type & Screen (If Applicable):  No results found for: LABABO, LABRH    Drug/Infectious Status (If Applicable):  Lab Results   Component Value Date    HEPCAB NON REACTIVE 05/24/2020       COVID-19 Screening (If Applicable):   Lab Results   Component Value Date    COVID19 NOT DETECTED 08/02/2021           Anesthesia Evaluation  Patient summary reviewed  Airway: Mallampati: II  TM distance: >3 FB   Neck ROM: full  Mouth opening: > = 3 FB Dental:    (+) edentulous      Pulmonary:Negative Pulmonary ROS and normal exam                               Cardiovascular:  Exercise tolerance: poor (<4 METS),   (+) hypertension:, valvular problems/murmurs: AS, hyperlipidemia        Rhythm: regular  Rate: abnormal  Echocardiogram reviewed ROS comment: PVC  ST    PE comment: Sinus tach   Neuro/Psych:   (+) psychiatric history:depression/anxiety              ROS comment: Closed compression fracture of L2 lumbar vertebra, initial encounter (HCC) GI/Hepatic/Renal:   (+) liver disease (Cirrhosis):,          ROS comment: Difficulty swallowing. Endo/Other:    (+) Diabetes, . Abdominal:             Vascular: Other Findings:               Anesthesia Plan      MAC     ASA 3       Induction: intravenous. MIPS: prophylactic pharmacologic antiemetic agents not administered perioperatively for documented reasons. Anesthetic plan and risks discussed with healthcare power of  (jori). Plan discussed with CRNA and attending. Pre Anesthesia Assessment complete.  Chart reviewed on 8/5/2021      WES Salguero - CRNA   8/5/2021

## 2021-08-05 NOTE — PROGRESS NOTES
Administered fleet enema per order, still not clear, [facility] notified and stated to bring pt to preop. Pt transported to [facility] via bed with cardiac monitor.  Report given to Beatriz Cerda

## 2021-08-05 NOTE — PROGRESS NOTES
Occupational Therapy      Attempted to see pt today. Pt supine in bed. HR ~130 w/o activity.  Will re-attempt later when pt is medically appropriate    Hayes Iraheta OTR/L [id number]  4:40 PM,8/5/2021

## 2021-08-05 NOTE — PLAN OF CARE
Problem: Falls - Risk of:  Goal: Will remain free from falls  Description: Will remain free from falls  8/5/2021 1622 by Jeimy Ordonez RN  Outcome: Ongoing  8/5/2021 1622 by Jeimy Ordonez RN  Outcome: Ongoing  Goal: Absence of physical injury  Description: Absence of physical injury  8/5/2021 1622 by Jeimy Ordonez RN  Outcome: Ongoing  8/5/2021 1622 by Jeimy Ordonez RN  Outcome: Ongoing     Problem: Skin Integrity:  Goal: Will show no infection signs and symptoms  Description: Will show no infection signs and symptoms  8/5/2021 1622 by Jeimy Ordonez RN  Outcome: Ongoing  8/5/2021 1622 by Jeimy Ordonez RN  Outcome: Ongoing  Goal: Absence of new skin breakdown  Description: Absence of new skin breakdown  8/5/2021 1622 by Jeimy Ordonez RN  Outcome: Ongoing  8/5/2021 1622 by Jeimy Ordonez RN  Outcome: Ongoing     Problem: Anxiety:  Goal: Level of anxiety will decrease  Description: Level of anxiety will decrease  Outcome: Ongoing

## 2021-08-05 NOTE — PROGRESS NOTES
Cardiology Progress Note     Today's Plan cpm    Admit Date:  8/2/2021    Consult reason/ Seen today for: gi bleeding- pre op    Subjective and  Overnight Events:  More awake today- eating lunch- no chest pain or shortness of breath      Assessment / Plan / Recommendation:     1. GI bleeding- hemoglobin 6.6 on admission - EGD no fresh blood noted to have small esophageal varices: for  colonoscopy -H/H stable   2. H/o severe AS- AVR 0.94 mm sq with mean gradient of 45 mmHg - for  TAVR once stable         History of Presenting Illness:    Chief complain on admission : 78 y. o.year old who is admitted for  Chief Complaint   Patient presents with   [place]     yesterday at 7 pm, c/o worsening chronic back pain        Past medical history:    has a past medical history of Anxiety, Cellulitis, Cirrhosis ([facility] [address] Closed compression fracture of L2 lumbar vertebra, initial encounter ([facility] [address] Constipation, Depression, Diabetes mellitus ([facility] [address] Hyperlipidemia, Hypertension, Insomnia, Rosacea, and Sinus tachycardia. Past surgical history:   has a past surgical history that includes back surgery; Hysterectomy; Toe Surgery; Upper gastrointestinal endoscopy (N/A, 5/26/2020); and Upper gastrointestinal endoscopy (N/A, 8/3/2021). Social History:   reports that she has never smoked. She has never used smokeless tobacco. She reports that she does not drink alcohol and does not use drugs. Family history:  family history is not on file.     Allergies   Allergen Reactions    Chocolate Other (See Comments)     Throbbing headache    Nuts [Peanut-Containing Drug Products] Other (See Comments)     \"makes my nose run and like i have a bad cold\"       Review of Systems:   All 14 systems were reviewed and are negative  Except for the positive findings which are documented     BP 95/62   Pulse 113   Temp 98.2 °F (36.8 °C) (Oral)   Resp 23   Ht 5' 4\" (1.626 m)   Wt 134 lb 6.4 oz (61 kg)   SpO2 96%   BMI 23.07 kg/m²       Intake/Output Summary (Last 24 hours) at 8/5/2021 1331  Last data filed at 8/5/2021 0751  Gross per 24 hour   Intake 710.42 ml   Output --   Net 710.42 ml       Physical Exam:  Physical Exam  HENT:      Head: Normocephalic and atraumatic. Cardiovascular:      Rate and Rhythm: Tachycardia present. Heart sounds: Murmur heard. Pulmonary:      Breath sounds: Normal breath sounds. Abdominal:      Palpations: Abdomen is soft. Musculoskeletal:      Right lower leg: No edema. Left lower leg: No edema. Skin:     General: Skin is warm and dry. Neurological:      Mental Status: She is alert. She is disoriented. Medications:    calcium-vitamin D  1 tablet Oral TID    insulin lispro  0-18 Units Subcutaneous TID WC    insulin lispro  0-9 Units Subcutaneous Nightly    pantoprazole  40 mg Intravenous Q12H    sodium chloride flush  5-40 mL Intravenous 2 times per day    ALPRAZolam  0.5 mg Oral TID    DULoxetine  60 mg Oral Daily    traZODone  50 mg Oral Nightly    busPIRone  15 mg Oral TID      lactated ringers      sodium chloride      sodium chloride      dextrose       sodium chloride, sodium chloride flush, sodium chloride, acetaminophen **OR** acetaminophen, glucose, dextrose, glucagon (rDNA), dextrose, promethazine **OR** promethazine, sodium chloride flush    Lab Data:  CBC:   Recent Labs     08/03/21 0234 08/03/21  0936 08/04/21 0421 08/04/21  1556 08/05/21  0544   WBC 11.3*  --   --   --   --    HGB 7.1*   < > 7.0* 9.8* 8.6*   HCT 23.7*   < > 24.5* 33.7* 29.0*   MCV 86.8  --   --   --   --      --   --   --   --     < > = values in this interval not displayed.      BMP:   Recent Labs     08/03/21 0234 08/04/21 0421 08/05/21  0544   * 135 135   K 4.0 3.8 4.3    103 102   CO2 26 25 27   BUN 22 19 12   CREATININE 0.7 0.5* 0.6     PT/INR:   No results for input(s): PROTIME, INR in the last 72 hours.  BNP:    No results for input(s): PROBNP in the last 72 hours. TROPONIN:   No results for input(s): TROPONINT in the last 72 hours. Impression:  Principal Problem:    GI bleed  Active Problems:    Fall    Cirrhosis of liver without ascites (HCC)    VHD (valvular heart disease)    AVM (arteriovenous malformation) of colon    Benign neoplasm of ascending colon    Benign neoplasm of sigmoid colon  Resolved Problems:    * No resolved hospital problems. *       All labs, medications and tests reviewed by myself, continue all other medications of all above medical condition listed as is except for changes mentioned above. Thank you   Please call with questions. Electronically signed by WES García CNP on 8/5/2021 at 1:31 PM  I have seen ,spoken to  and examined this patient personally, independently of the nurse practitioner. I have reviewed the hospital care given to date and reviewed all pertinent labs and imaging. The plan was developed mutually at the time of the visit with the patient,  NP  and myself. I have spoken with patient, nursing staff and provided written and verbal instructions . The above note has been reviewed and I agree with the assessment, diagnosis, and treatment plan with changes made by me as follows     CARDIOLOGY ATTENDING ADDENDUM    HPI:  I have reviewed the above HPI  And agree with above   Ebony Sadler is a 78 y. o.year old who and presents with had concerns including Fall (yesterday at 7 pm, c/o worsening chronic back pain).   Chief Complaint   Patient presents with   [place]     yesterday at 7 pm, c/o worsening chronic back pain     Interval history:  Has gi bleed    Physical Exam:  General:  Awake, alert, NAD  Head:normal  Eye:normal  Neck:  No JVD   Chest:  Clear to auscultation, respiration easy  Cardiovascular:  RRR S1S2  Abdomen:   nontender  Extremities:  tr edema  Pulses; palpable  Neuro: grossly normal      MEDICAL DECISION MAKING;    I agree with the above plan, which was planned by myself and discussed with NP.  cscope reviewed  tavr as op      Glorine Bamberger MD [facility]

## 2021-08-05 NOTE — ANESTHESIA POSTPROCEDURE EVALUATION
Department of Anesthesiology  Postprocedure Note    Patient: Talia Arriaga  MRN: [id number]  YOB: 1940  Date of evaluation: 8/5/2021  Time:  7:52 AM     Procedure Summary     Date: 08/05/21 Room / Location: [address] [place] [address]    Anesthesia Start: 6310 Anesthesia Stop: 3947    Procedure: COLONOSCOPY DIAGNOSTIC (N/A ) Diagnosis: (GI BLEED)    Surgeons: Navid Roper MD Responsible Provider: WES Castillo CRNA    Anesthesia Type: MAC ASA Status: 3          Anesthesia Type: MAC    Abe Phase I:      Abe Phase II:      Last vitals: Reviewed and per EMR flowsheets.        Anesthesia Post Evaluation    Patient location during evaluation: bedside  Patient participation: complete - patient participated  Level of consciousness: awake and alert  Pain score: 0  Airway patency: patent  Nausea & Vomiting: no vomiting and no nausea  Complications: no  Cardiovascular status: blood pressure returned to baseline and hemodynamically stable  Respiratory status: acceptable, room air, spontaneous ventilation and nonlabored ventilation  Hydration status: stable

## 2021-08-05 NOTE — BRIEF OP NOTE
BRIEF COLONOSCOPY REPORT:   Photos and full colonoscopy report available by going to \"chart review\" then \"procedures\" then  \"colonoscopy\" then \"View Report\"      IMPRESSION :   1) 3 AVM's in lower ascending colon- all coagulated with the APC  2) 3 mm polyp removed from the mid-ascending colon  3) 5 mm polyp removed from the sigmoid  4) mild sigmoid diverticulosis  5) small internal hemorrhoids  6) otherwise normal colon    PLAN :   1) iron replacement  2) monitor H/H

## 2021-08-05 NOTE — PROGRESS NOTES
Patient is awake and responsive to staff. Preop questions reviewed and verified with nurse. H&P and consent completed. Report received from [facility].

## 2021-08-05 NOTE — PROGRESS NOTES
Progress Note        Name:  Fatuma Dumont /Age/Sex: 1940  (78 y.o. female)   MRN & CSN:  [id number] & [id number] Admission Date/Time: 2021 10:35 AM   Location:  [facility] PCP: Ann-Marie Islas MD       Hospital Day: 4    Assessment and Plan:   Fatuma Dumont is a 78 y.o.  female  with past medical history of DM, HTN who presents after a fall with several days of hematochezia. Reported to have melenic stools in ED, Hg 6.6.      Acute blood loss anemia secondary to likely GI Bleed  Tachycardia likely physiologic secondary to anemia. Will monitor. Denies chest pain or shortness of breath  Hg continues to trend down. GI consulted, recommendations appreciated  EGD 8/3/21 showed small esophageal varices & mild portal hypertensive gastropathy. No definitive source noted. Cardiology consulted to clear for colonoscopy. Plan for slow bowel prep & colonoscopy to rule out andgiodysplasia and lower GI bleed. Hx of EGD 2020 showed esophageal rings post dilation, no varices noted. Transfused 2u PRBC 21 & 21  Post transfusion H/H pending  Clear liquid diet as tolerated. Appears euvolemic. Hold IVF as tolerated. Trend H & H  Continue to hold ASA  Continue IV PPI  Coags WNL  CT abd/pelvis with contrast shows No acute intra abdominal process. Cirrhosis & Esophageal varices resent. Cholelithiasis w/ moderate gallbladder distension. LFTs normal     History of cirrhosis  Unclear etiology  GI Following     Type 2 Diabetes (A1c7.5) with hypeglycemia () on admission  - SSI & Accuchecks  Increase to high dose ISS     SIRS (WBC 14.8)  Leukocytosis resolved, likely reactive secondary to fall and GI hemorrhage     Mechanical fall  - No acute injury or fracture noted.  - PT/OT eval when appropriate.     Stage 2 Sacral decubitus ulcer  -wound care  - monitor for progression    History of Paranoid schizophrenia with depression and anxiety.   - Continue Home regimen  - Duloxetine, Buspar, Xanax and trazodone.       Severe Aortic Stenosis  - monitor volume status  - monitor for cardiac decompensation    DVT prophylaxis  SCDs only in setting of acute blood loss    Diet ADULT DIET; Regular; 4 carb choices (60 gm/meal); No Added Salt (3-4 gm)    DVT Prophylaxis [] Lovenox, []  Heparin, [x] SCDs, [] Ambulation  [] Long term AC   GI Prophylaxis [x] PPI,  [] H2 Blocker,  [] Carafate,  [] Diet,  [] No GI prophylaxis, N/A: patient is not under significant medical stress, non-ICU or is receiving a diet/tube feeds   Code Status Full Code Full CODE   Disposition Patient requires continued admission due to GI bleed. Discharge Plan: back to home / [facility] / when able / discharge to hospice after seen by case management team. Patient plans to return home upon discharge   MDM [] Low, [] Moderate,[x]  High  Patient's risk as above due to:      [x] One or more chronic illnesses with mild exacerbation progression      [x] Two or more stable chronic illnesses      [] Undiagnosed new problem with uncertain prognosis      [x] Elective major surgery      [x]Prescription drug management       History of Present Illness:     Chief Complaint: GI bleed  Ayana Webb is a 78 y.o.  female  who presents with anemia. Patient seen and examined bedside in room 3108. Hg up to 8.6. Had small amount of blood bowel movement after colonoscopy. 3 AVM's coagulated with APC. Possible culprits of hemorrhage. Will continue to trend H/H. Patients daughter reports patient has had hospital delirium in the past.  Currently patient is having word finding issues over the last few days and does not know her age. Family denies history of dementia. She does confirm a history of schizophrenia but is not on any antipsychotics. Medications reconciled on admission. Will consult neurology for further evaluation of changes in speech from patients baseline over the last 4 days.   Patient remains debilitated and requires assistance with transfers. Denies chest pain or shortness of breath. Ten point ROS reviewed negative, unless as noted above    Objective: Intake/Output Summary (Last 24 hours) at 8/5/2021 0840  Last data filed at 8/5/2021 0751  Gross per 24 hour   Intake 710.42 ml   Output --   Net 710.42 ml      Vitals:   Vitals:    08/05/21 0837   BP:    Pulse:    Resp: 18   Temp:    SpO2: 96%     Physical Exam:   GEN Awake female, sitting upright in bed in no apparent distress. Appears given age. EYES Pupils are equally round. No scleral erythema, discharge, or conjunctivitis. HENT Mucous membranes are moist. Oral pharynx without exudates, no evidence of thrush. NECK Supple, no apparent thyromegaly or masses. RESP Clear to auscultation, no wheezes, rales or rhonchi. Symmetric chest movement while on room air. CARDIO/VASC S1/S2 auscultated. Regular rate 125 without appreciable murmurs, rubs, or gallops. No JVD or carotid bruits. Peripheral pulses equal bilaterally and palpable. No peripheral edema. GI Abdomen is soft without significant tenderness, masses, or guarding. Bowel sounds are normoactive. Rectal exam deferred.  No costovertebral angle tenderness. Normal appearing external genitalia. Lewis catheter is not present. HEME/LYMPH No palpable cervical lymphadenopathy and no hepatosplenomegaly. No petechiae or ecchymoses. MSK No gross joint deformities. SKIN Normal coloration, warm, dry. NEURO Cranial nerves appear grossly intact, normal speech, no lateralizing weakness. PSYCH Awake, alert, oriented x 1. Affect appropriate. Past Medical History:      Past Medical History:   Diagnosis Date    Anxiety     Cellulitis     foot.      Cirrhosis ([address] 02/27/2020    seen on CT scan    Closed compression fracture of L2 lumbar vertebra, initial encounter ([address] 02/29/2020    seen on CT    Constipation     Depression     Diabetes mellitus ([address]     Hyperlipidemia     Hypertension     Insomnia     Rosacea     Sinus tachycardia      PSHX:  has a past surgical history that includes back surgery; Hysterectomy; Toe Surgery; Upper gastrointestinal endoscopy (N/A, 5/26/2020); and Upper gastrointestinal endoscopy (N/A, 8/3/2021). Allergies: Allergies   Allergen Reactions    Chocolate Other (See Comments)     Throbbing headache    Nuts [Peanut-Containing Drug Products] Other (See Comments)     \"makes my nose run and like i have a bad cold\"       FAM HX: family history is not on file. Soc HX:   Social History     Socioeconomic History    Marital status:      Spouse name: None    Number of children: None    Years of education: None    Highest education level: None   Occupational History    None   Tobacco Use    Smoking status: Never Smoker    Smokeless tobacco: Never Used   Vaping Use    Vaping Use: Never used   Substance and Sexual Activity    Alcohol use: No    Drug use: No    Sexual activity: None   Other Topics Concern    None   Social History Narrative    None     Social Determinants of Health     Financial Resource Strain:     Difficulty of Paying Living Expenses:    Food Insecurity:     Worried About Running Out of Food in the Last Year:     Ran Out of Food in the Last Year:    Transportation Needs:     Lack of Transportation (Medical):      Lack of Transportation (Non-Medical):    Physical Activity:     Days of Exercise per Week:     Minutes of Exercise per Session:    Stress:     Feeling of Stress :    Social Connections:     Frequency of Communication with Friends and Family:     Frequency of Social Gatherings with Friends and Family:     Attends Taoist Services:     Active Member of Clubs or Organizations:     Attends Club or Organization Meetings:     Marital Status:    Intimate Partner Violence:     Fear of Current or Ex-Partner:     Emotionally Abused:     Physically Abused:     Sexually Abused:        Medications:   Medications:    calcium-vitamin D  1 tablet Oral TID    pantoprazole  40 mg Intravenous Q12H    sodium chloride flush  5-40 mL Intravenous 2 times per day    insulin lispro  0-6 Units Subcutaneous TID WC    insulin lispro  0-3 Units Subcutaneous Nightly    ALPRAZolam  0.5 mg Oral TID    DULoxetine  60 mg Oral Daily    traZODone  50 mg Oral Nightly    busPIRone  15 mg Oral TID      Infusions:    lactated ringers      sodium chloride      sodium chloride      dextrose       PRN Meds: sodium chloride, , PRN  sodium chloride flush, 5-40 mL, PRN  sodium chloride, 25 mL, PRN  acetaminophen, 650 mg, Q6H PRN   Or  acetaminophen, 650 mg, Q6H PRN  glucose, 15 g, PRN  dextrose, 12.5 g, PRN  glucagon (rDNA), 1 mg, PRN  dextrose, 100 mL/hr, PRN  promethazine, 12.5 mg, Q6H PRN   Or  promethazine, 12.5 mg, Q6H PRN  sodium chloride flush, 10 mL, ONCE PRN          Electronically signed by Aicha Mehta DO on 8/5/2021 at 8:40 AM

## 2021-08-06 LAB
ANION GAP SERPL CALCULATED.3IONS-SCNC: 10 MMOL/L (ref 4–16)
ANION GAP SERPL CALCULATED.3IONS-SCNC: 6 MMOL/L (ref 4–16)
BASOPHILS ABSOLUTE: 0 K/CU MM
BASOPHILS RELATIVE PERCENT: 0.4 % (ref 0–1)
BUN BLDV-MCNC: 8 MG/DL (ref 6–23)
BUN BLDV-MCNC: 8 MG/DL (ref 6–23)
CALCIUM SERPL-MCNC: 8.4 MG/DL (ref 8.3–10.6)
CALCIUM SERPL-MCNC: 8.4 MG/DL (ref 8.3–10.6)
CHLORIDE BLD-SCNC: 100 MMOL/L (ref 99–110)
CHLORIDE BLD-SCNC: 98 MMOL/L (ref 99–110)
CO2: 25 MMOL/L (ref 21–32)
CO2: 27 MMOL/L (ref 21–32)
CREAT SERPL-MCNC: 0.6 MG/DL (ref 0.6–1.1)
CREAT SERPL-MCNC: 0.7 MG/DL (ref 0.6–1.1)
DIFFERENTIAL TYPE: ABNORMAL
EOSINOPHILS ABSOLUTE: 0.2 K/CU MM
EOSINOPHILS RELATIVE PERCENT: 2.3 % (ref 0–3)
FOLATE: 13.2 NG/ML (ref 3.1–17.5)
GFR AFRICAN AMERICAN: >60 ML/MIN/1.73M2
GFR AFRICAN AMERICAN: >60 ML/MIN/1.73M2
GFR NON-AFRICAN AMERICAN: >60 ML/MIN/1.73M2
GFR NON-AFRICAN AMERICAN: >60 ML/MIN/1.73M2
GLUCOSE BLD-MCNC: 176 MG/DL (ref 70–99)
GLUCOSE BLD-MCNC: 191 MG/DL (ref 70–99)
GLUCOSE BLD-MCNC: 203 MG/DL (ref 70–99)
GLUCOSE BLD-MCNC: 205 MG/DL (ref 70–99)
GLUCOSE BLD-MCNC: 219 MG/DL (ref 70–99)
GLUCOSE BLD-MCNC: 288 MG/DL (ref 70–99)
HCT VFR BLD CALC: 27.5 % (ref 37–47)
HCT VFR BLD CALC: 28.6 % (ref 37–47)
HCT VFR BLD CALC: 31.4 % (ref 37–47)
HEMOGLOBIN: 8.5 GM/DL (ref 12.5–16)
HEMOGLOBIN: 8.8 GM/DL (ref 12.5–16)
HEMOGLOBIN: 9.1 GM/DL (ref 12.5–16)
HOMOCYSTEINE: 10.7 UMOL/L (ref 0–10)
IMMATURE NEUTROPHIL %: 0.4 % (ref 0–0.43)
LV EF: 58 %
LVEF MODALITY: NORMAL
LYMPHOCYTES ABSOLUTE: 1 K/CU MM
LYMPHOCYTES RELATIVE PERCENT: 14.5 % (ref 24–44)
MCH RBC QN AUTO: 26.1 PG (ref 27–31)
MCHC RBC AUTO-ENTMCNC: 29 % (ref 32–36)
MCV RBC AUTO: 90 FL (ref 78–100)
MONOCYTES ABSOLUTE: 0.8 K/CU MM
MONOCYTES RELATIVE PERCENT: 11.7 % (ref 0–4)
NUCLEATED RBC %: 0 %
PDW BLD-RTO: 16.2 % (ref 11.7–14.9)
PLATELET # BLD: 284 K/CU MM (ref 140–440)
PMV BLD AUTO: 10 FL (ref 7.5–11.1)
POTASSIUM SERPL-SCNC: 4.2 MMOL/L (ref 3.5–5.1)
POTASSIUM SERPL-SCNC: 4.3 MMOL/L (ref 3.5–5.1)
RBC # BLD: 3.49 M/CU MM (ref 4.2–5.4)
SEGMENTED NEUTROPHILS ABSOLUTE COUNT: 5 K/CU MM
SEGMENTED NEUTROPHILS RELATIVE PERCENT: 70.7 % (ref 36–66)
SODIUM BLD-SCNC: 133 MMOL/L (ref 135–145)
SODIUM BLD-SCNC: 133 MMOL/L (ref 135–145)
TOTAL IMMATURE NEUTOROPHIL: 0.03 K/CU MM
TOTAL NUCLEATED RBC: 0 K/CU MM
TSH HIGH SENSITIVITY: 1.03 UIU/ML (ref 0.27–4.2)
VITAMIN B-12: 1440 PG/ML (ref 211–911)
WBC # BLD: 7.1 K/CU MM (ref 4–10.5)

## 2021-08-06 PROCEDURE — 6370000000 HC RX 637 (ALT 250 FOR IP): Performed by: PSYCHIATRY & NEUROLOGY

## 2021-08-06 PROCEDURE — 6370000000 HC RX 637 (ALT 250 FOR IP): Performed by: SPECIALIST

## 2021-08-06 PROCEDURE — 82607 VITAMIN B-12: CPT

## 2021-08-06 PROCEDURE — 94761 N-INVAS EAR/PLS OXIMETRY MLT: CPT

## 2021-08-06 PROCEDURE — 6370000000 HC RX 637 (ALT 250 FOR IP): Performed by: HOSPITALIST

## 2021-08-06 PROCEDURE — 82746 ASSAY OF FOLIC ACID SERUM: CPT

## 2021-08-06 PROCEDURE — 82962 GLUCOSE BLOOD TEST: CPT

## 2021-08-06 PROCEDURE — 80048 BASIC METABOLIC PNL TOTAL CA: CPT

## 2021-08-06 PROCEDURE — 85018 HEMOGLOBIN: CPT

## 2021-08-06 PROCEDURE — 6360000002 HC RX W HCPCS: Performed by: SPECIALIST

## 2021-08-06 PROCEDURE — 6370000000 HC RX 637 (ALT 250 FOR IP): Performed by: FAMILY MEDICINE

## 2021-08-06 PROCEDURE — 84443 ASSAY THYROID STIM HORMONE: CPT

## 2021-08-06 PROCEDURE — 93306 TTE W/DOPPLER COMPLETE: CPT

## 2021-08-06 PROCEDURE — 83090 ASSAY OF HOMOCYSTEINE: CPT

## 2021-08-06 PROCEDURE — 2140000000 HC CCU INTERMEDIATE R&B

## 2021-08-06 PROCEDURE — 2580000003 HC RX 258: Performed by: SPECIALIST

## 2021-08-06 PROCEDURE — 85025 COMPLETE CBC W/AUTO DIFF WBC: CPT

## 2021-08-06 PROCEDURE — 85014 HEMATOCRIT: CPT

## 2021-08-06 PROCEDURE — C9113 INJ PANTOPRAZOLE SODIUM, VIA: HCPCS | Performed by: SPECIALIST

## 2021-08-06 PROCEDURE — 36415 COLL VENOUS BLD VENIPUNCTURE: CPT

## 2021-08-06 PROCEDURE — 97530 THERAPEUTIC ACTIVITIES: CPT

## 2021-08-06 PROCEDURE — 2500000003 HC RX 250 WO HCPCS: Performed by: FAMILY MEDICINE

## 2021-08-06 PROCEDURE — 97110 THERAPEUTIC EXERCISES: CPT

## 2021-08-06 RX ORDER — CARVEDILOL 3.12 MG/1
3.12 TABLET ORAL 2 TIMES DAILY WITH MEALS
Status: DISCONTINUED | OUTPATIENT
Start: 2021-08-06 | End: 2021-08-08 | Stop reason: HOSPADM

## 2021-08-06 RX ADMIN — SODIUM CHLORIDE, PRESERVATIVE FREE 10 ML: 5 INJECTION INTRAVENOUS at 21:21

## 2021-08-06 RX ADMIN — PANTOPRAZOLE SODIUM 40 MG: 40 INJECTION, POWDER, FOR SOLUTION INTRAVENOUS at 16:00

## 2021-08-06 RX ADMIN — BUSPIRONE HYDROCHLORIDE 20 MG: 10 TABLET ORAL at 08:06

## 2021-08-06 RX ADMIN — BUSPIRONE HYDROCHLORIDE 20 MG: 10 TABLET ORAL at 21:21

## 2021-08-06 RX ADMIN — BUSPIRONE HYDROCHLORIDE 20 MG: 10 TABLET ORAL at 16:00

## 2021-08-06 RX ADMIN — TRAZODONE HYDROCHLORIDE 50 MG: 50 TABLET ORAL at 21:21

## 2021-08-06 RX ADMIN — SODIUM CHLORIDE, PRESERVATIVE FREE 10 ML: 5 INJECTION INTRAVENOUS at 08:06

## 2021-08-06 RX ADMIN — DONEPEZIL HYDROCHLORIDE 5 MG: 5 TABLET, FILM COATED ORAL at 21:21

## 2021-08-06 RX ADMIN — MICONAZOLE NITRATE: 2 POWDER TOPICAL at 21:20

## 2021-08-06 RX ADMIN — CARVEDILOL 3.12 MG: 3.12 TABLET, FILM COATED ORAL at 16:00

## 2021-08-06 RX ADMIN — PANTOPRAZOLE SODIUM 40 MG: 40 INJECTION, POWDER, FOR SOLUTION INTRAVENOUS at 03:28

## 2021-08-06 RX ADMIN — Medication 1 TABLET: at 21:21

## 2021-08-06 RX ADMIN — MICONAZOLE NITRATE: 2 POWDER TOPICAL at 16:01

## 2021-08-06 RX ADMIN — Medication 1 TABLET: at 08:06

## 2021-08-06 RX ADMIN — Medication 1 TABLET: at 16:00

## 2021-08-06 RX ADMIN — CLOPIDOGREL BISULFATE 75 MG: 75 TABLET ORAL at 08:05

## 2021-08-06 RX ADMIN — DULOXETINE HYDROCHLORIDE 60 MG: 30 CAPSULE, DELAYED RELEASE ORAL at 08:06

## 2021-08-06 ASSESSMENT — PAIN SCALES - GENERAL
PAINLEVEL_OUTOF10: 0

## 2021-08-06 NOTE — CONSULTS
Verena Vargas MD.  Section of General Neurology - Adult  Consult Note        Reason for Consult:    Requesting Physician:  No referring provider defined for this encounter. Thank you for your kind referral.    CHIEF COMPLAINT:  fall         HISTORY OF PRESENT ILLNESS:              The patient is a 78 y.o. female with a history of female with significant past medical history of DM, HTN presents as she had a fall yesterday while going to the bathroom and hit a chair and fell on her back. Did not hit her head. She was on the floor and scooted her self to a chair. No abd pain but did have left leg pain. UPon arrival she had anemia and tachycardic. She did state for past couple of day she had \"normal\" color red stools. CT Brain was neg. Pt has a hx of short term memory difficulty and has dx of dementia.                  fall. Past Medical History:        Diagnosis Date    Anxiety     Cellulitis     foot.      Cirrhosis ([facility] [address] 02/27/2020    seen on CT scan    Closed compression fracture of L2 lumbar vertebra, initial encounter ([facility] [address] 02/29/2020    seen on CT    Constipation     Depression     Diabetes mellitus ([facility] [address]     Hyperlipidemia     Hypertension     Insomnia     Rosacea     Sinus tachycardia      Past Surgical History:        Procedure Laterality Date    BACK SURGERY      HYSTERECTOMY      TOE SURGERY      UPPER GASTROINTESTINAL ENDOSCOPY N/A 5/26/2020    EGD DILATION BALLOON performed by Stacy Kirkpatrick MD at [place] [address] N/A 8/3/2021    EGD DIAGNOSTIC ONLY RECTAL EXAM PER DR. Elizabeth Martin performed by Stacy Kirkpatrick MD at [facility] ENDOSCOPY     Current Medications:   Current Facility-Administered Medications: calcium-vitamin D 500-200 MG-UNIT per tablet 1 tablet, 1 tablet, Oral, TID  insulin lispro (HUMALOG) injection vial 0-18 Units, 0-18 Units, Subcutaneous, TID WC  insulin lispro (HUMALOG) injection vial 0-9 Units, 0-9 Units, Subcutaneous, Nightly  0.9 % sodium chloride infusion, , Intravenous, PRN  pantoprazole (PROTONIX) injection 40 mg, 40 mg, Intravenous, Q12H  sodium chloride flush 0.9 % injection 5-40 mL, 5-40 mL, Intravenous, 2 times per day  sodium chloride flush 0.9 % injection 5-40 mL, 5-40 mL, Intravenous, PRN  0.9 % sodium chloride infusion, 25 mL, Intravenous, PRN  acetaminophen (TYLENOL) tablet 650 mg, 650 mg, Oral, Q6H PRN **OR** acetaminophen (TYLENOL) suppository 650 mg, 650 mg, Rectal, Q6H PRN  glucose (GLUTOSE) 40 % oral gel 15 g, 15 g, Oral, PRN  dextrose 50 % IV solution, 12.5 g, Intravenous, PRN  glucagon (rDNA) injection 1 mg, 1 mg, Intramuscular, PRN  dextrose 5 % solution, 100 mL/hr, Intravenous, PRN  promethazine (PHENERGAN) tablet 12.5 mg, 12.5 mg, Oral, Q6H PRN **OR** promethazine (PHENERGAN) injection 12.5 mg, 12.5 mg, Intramuscular, Q6H PRN  sodium chloride flush 0.9 % injection 10 mL, 10 mL, Intravenous, ONCE PRN  DULoxetine (CYMBALTA) extended release capsule 60 mg, 60 mg, Oral, Daily  traZODone (DESYREL) tablet 50 mg, 50 mg, Oral, Nightly  busPIRone (BUSPAR) tablet 15 mg, 15 mg, Oral, TID  Allergies:  Chocolate and Nuts [peanut-containing drug products]    Social History:  TOBACCO:   reports that she has never smoked. She has never used smokeless tobacco.  ETOH:   reports no history of alcohol use. DRUGS:   reports no history of drug use. Family History:   History reviewed. No pertinent family history. REVIEW OF SYSTEMS:  CONSTITUTIONAL:  negative  HEENT:  negative  RESPIRATORY:  negative  CARDIOVASCULAR:  negative  GASTROINTESTINAL:  negative  GENITOURINARY:  negative  MUSCULOSKELETAL:  negative  BEHAVIOR/PSYCH:  Negative    ROS NEG    Family hx neg    PHYSICAL EXAM  ------------------------  Vitals:  /64   Pulse 123   Temp 98 °F (36.7 °C) (Oral)   Resp 16   Ht 5' 4\" (1.626 m)   Wt 134 lb 6.4 oz (61 kg)   SpO2 93%   BMI 23.07 kg/m²      General:  Awake, alert, oriented X 3.   Well developed, well nourished, well groomed. No apparent distress. HEENT:  Normocephalic, atraumatic. Pupils equal, round, reactive to light. No scleral icterus. No conjunctival injection. Normal lips, teeth, and gums. No nasal discharge. Neck:  Supple  Heart:  RRR, no murmurs, gallops, rubs  Lungs:  CTA bilaterally, bilat symmetrical expansion, no wheeze, rales, or rhonchi  Abdomen: Bowel sounds present, soft, nontender, no masses, no organomegaly, no peritoneal signs  Extremities:  No clubbing, cyanosis, or edema  Skin:  Warm and dry, no open lesions or rash  Breast: deferred  Rectal: deferred  Genitalia:  deferred    NEUROLOGICAL EXAM  ---------------------------------    Mental Status Exam:             Alert and oriented times three,follows commands,speech and language intact    Cranial [id number] Intact.         Cranial nerve II           Visual acuity:  normal                 Cranial nerve III           Pupils:  equal, round, reactive to light      Cranial nerves III, IV, VI           Extraocular Movements: intact      Cranial nerve V           Facial sensation:  intact      Cranial nerve VII           Facial strength: intact      Cranial nerve VIII           Hearing:  intact      Cranial nerve IX           Palate:  intact      Cranial nerve XI         Shoulder shrug:  intact      Cranial nerve XII          Tongue movement:  normal    Motor:    Drift:  absent  Motor exam is symmetrical 5 out of 5 all extremities bilaterally  Tone:  normal  Abnormal Movements:  Absent    DTRs-2+ biceps,triceps,brachioradialis,knee jerks and ankle jerks bilaterally symmetrical.  Toes-downgoing bilaterally            Sensory:normal sensation              CBC with Differential:    Lab Results   Component Value Date    WBC 11.3 08/03/2021    RBC 2.73 08/03/2021    HGB 9.0 08/05/2021    HCT 30.2 08/05/2021     08/03/2021    MCV 86.8 08/03/2021    MCH 26.0 08/03/2021    MCHC 30.0 08/03/2021    RDW 16.4 08/03/2021    SEGSPCT 76.1 08/02/2021 LYMPHOPCT 12.1 08/02/2021    MONOPCT 10.8 08/02/2021    MYELOPCT 1 02/25/2020    BASOPCT 0.2 08/02/2021    MONOSABS 1.6 08/02/2021    LYMPHSABS 1.8 08/02/2021    EOSABS 0.0 08/02/2021    BASOSABS 0.0 08/02/2021    DIFFTYPE AUTOMATED DIFFERENTIAL 08/02/2021     CMP:    Lab Results   Component Value Date     08/05/2021    K 4.3 08/05/2021     08/05/2021    CO2 27 08/05/2021    BUN 12 08/05/2021    CREATININE 0.6 08/05/2021    GFRAA >60 08/05/2021    LABGLOM >60 08/05/2021    GLUCOSE 186 08/05/2021    PROT 5.3 08/03/2021    LABALBU 3.3 08/03/2021    CALCIUM 7.9 08/05/2021    BILITOT 1.0 08/03/2021    ALKPHOS 126 08/03/2021    AST 37 08/03/2021    ALT 21 08/03/2021     BMP:    Lab Results   Component Value Date     08/05/2021    K 4.3 08/05/2021     08/05/2021    CO2 27 08/05/2021    BUN 12 08/05/2021    LABALBU 3.3 08/03/2021    CREATININE 0.6 08/05/2021    CALCIUM 7.9 08/05/2021    GFRAA >60 08/05/2021    LABGLOM >60 08/05/2021    GLUCOSE 186 08/05/2021     PT/INR:    Lab Results   Component Value Date    PROTIME 13.9 08/02/2021    INR 1.08 08/02/2021     PTT:    Lab Results   Component Value Date    APTT 27.0 08/02/2021   [APTT  U/A:    Lab Results   Component Value Date    COLORU YELLOW 08/02/2021    WBCUA 2 08/02/2021    RBCUA 1 08/02/2021    MUCUS RARE 10/25/2019    TRICHOMONAS NONE SEEN 08/02/2021    BACTERIA NEGATIVE 08/02/2021    CLARITYU CLEAR 08/02/2021    SPECGRAV 1.015 08/02/2021    LEUKOCYTESUR NEGATIVE 08/02/2021    UROBILINOGEN NEGATIVE 08/02/2021    BILIRUBINUR NEGATIVE 08/02/2021    BLOODU NEGATIVE 08/02/2021     TSH:  No results found for: TSH  VITAMIN B12: No components found for: B12  FOLATE:    Lab Results   Component Value Date    FOLATE 7.6 02/22/2020     RPR:  No results found for: RPR  EMBER:    Lab Results   Component Value Date    EMBER None Detected 02/22/2020    EMBER  02/22/2020     (NOTE)  If suspicion of connective tissue disease is strong and EMBER EIA is   negative,

## 2021-08-06 NOTE — PLAN OF CARE
Problem: Falls - Risk of:  Goal: Will remain free from falls  Description: Will remain free from falls  8/5/2021 2246 by Jean-Paul Finnegan RN  Outcome: Met This Shift  8/5/2021 1622 by Viviana Mcneil RN  Outcome: Ongoing  8/5/2021 1622 by Viviana Mcneil RN  Outcome: Ongoing  Goal: Absence of physical injury  Description: Absence of physical injury  8/5/2021 2246 by Jean-Paul Finnegan RN  Outcome: Met This Shift  8/5/2021 1622 by Viviana Mcneil RN  Outcome: Ongoing  8/5/2021 1622 by Viviana Mcneil RN  Outcome: Ongoing     Problem: Skin Integrity:  Goal: Will show no infection signs and symptoms  Description: Will show no infection signs and symptoms  8/5/2021 2246 by Jean-Paul Finnegan RN  Outcome: Met This Shift  8/5/2021 1622 by Viviana Mcneil RN  Outcome: Ongoing  8/5/2021 1622 by Viviana Mcneil RN  Outcome: Ongoing  Goal: Absence of new skin breakdown  Description: Absence of new skin breakdown  8/5/2021 2246 by Jean-Paul Finnegan RN  Outcome: Met This Shift  8/5/2021 1622 by Viviana Mcneil RN  Outcome: Ongoing  8/5/2021 1622 by Viviana Mcneil RN  Outcome: Ongoing     Problem: Anxiety:  Goal: Level of anxiety will decrease  Description: Level of anxiety will decrease  8/5/2021 2246 by Jean-Paul Finnegan RN  Outcome: Met This Shift  8/5/2021 1622 by Viviana Mcneil RN  Outcome: Ongoing

## 2021-08-06 NOTE — PROGRESS NOTES
fall and GI hemorrhage     Mechanical fall  - No acute injury or fracture noted.  - PT/OT eval when appropriate.     Stage 2 Sacral decubitus ulcer  -wound care  - monitor for progression    History of Paranoid schizophrenia with depression and anxiety. - Continue Home regimen  - Duloxetine, Buspar, Xanax and trazodone.       Severe Aortic Stenosis  - monitor volume status  - monitor for cardiac decompensation    DVT prophylaxis  SCDs only in setting of acute blood loss    Diet ADULT DIET; Regular; 4 carb choices (60 gm/meal); No Added Salt (3-4 gm)    DVT Prophylaxis [] Lovenox, []  Heparin, [x] SCDs, [] Ambulation  [] Long term AC   GI Prophylaxis [x] PPI,  [] H2 Blocker,  [] Carafate,  [] Diet,  [] No GI prophylaxis, N/A: patient is not under significant medical stress, non-ICU or is receiving a diet/tube feeds   Code Status Full Code Full CODE   Disposition Patient requires continued admission due to GI bleed. Discharge Plan: back to home / [facility] / when able / discharge to hospice after seen by case management team. Patient plans to return home upon discharge   MDM [] Low, [] Moderate,[x]  High  Patient's risk as above due to:      [x] One or more chronic illnesses with mild exacerbation progression      [x] Two or more stable chronic illnesses      [] Undiagnosed new problem with uncertain prognosis      [x] Elective major surgery      [x]Prescription drug management       History of Present Illness:     Chief Complaint: GI bleed  Rosalva Yousif is a 78 y.o.  female  who presents with anemia. Patient seen and examined bedside in room 3108. Hg up to 8.6. No acute interval events. Pending MRI brain. Patient voices no new complaints. Will monitor for tolerability of ASA, Plavix and Aricept.     Ten point ROS reviewed negative, unless as noted above    Objective:     No intake or output data in the 24 hours ending 08/06/21 1411   Vitals:   Vitals:    08/06/21 0802   BP: 120/61   Pulse: 114   Resp: 16 headache    Nuts [Peanut-Containing Drug Products] Other (See Comments)     \"makes my nose run and like i have a bad cold\"       FAM HX: family history is not on file. Soc HX:   Social History     Socioeconomic History    Marital status:      Spouse name: None    Number of children: None    Years of education: None    Highest education level: None   Occupational History    None   Tobacco Use    Smoking status: Never Smoker    Smokeless tobacco: Never Used   Vaping Use    Vaping Use: Never used   Substance and Sexual Activity    Alcohol use: No    Drug use: No    Sexual activity: None   Other Topics Concern    None   Social History Narrative    None     Social Determinants of Health     Financial Resource Strain:     Difficulty of Paying Living Expenses:    Food Insecurity:     Worried About Running Out of Food in the Last Year:     Ran Out of Food in the Last Year:    Transportation Needs:     Lack of Transportation (Medical):      Lack of Transportation (Non-Medical):    Physical Activity:     Days of Exercise per Week:     Minutes of Exercise per Session:    Stress:     Feeling of Stress :    Social Connections:     Frequency of Communication with Friends and Family:     Frequency of Social Gatherings with Friends and Family:     Attends [employer] Services:     Active Member of Clubs or Organizations:     Attends Club or Organization Meetings:     Marital Status:    Intimate Partner Violence:     Fear of Current or Ex-Partner:     Emotionally Abused:     Physically Abused:     Sexually Abused:        Medications:   Medications:    miconazole   Topical BID    insulin lispro  0-18 Units Subcutaneous TID WC    insulin lispro  0-9 Units Subcutaneous Nightly    donepezil  5 mg Oral Nightly    clopidogrel  75 mg Oral Daily    busPIRone  20 mg Oral TID    calcium-cholecalciferol  1 tablet Oral TID    pantoprazole  40 mg Intravenous Q12H    sodium chloride flush  5-40 mL Intravenous 2 times per day    DULoxetine  60 mg Oral Daily    traZODone  50 mg Oral Nightly      Infusions:    sodium chloride      sodium chloride      dextrose       PRN Meds: sodium chloride, , PRN  sodium chloride flush, 5-40 mL, PRN  sodium chloride, 25 mL, PRN  acetaminophen, 650 mg, Q6H PRN   Or  acetaminophen, 650 mg, Q6H PRN  glucose, 15 g, PRN  dextrose, 12.5 g, PRN  glucagon (rDNA), 1 mg, PRN  dextrose, 100 mL/hr, PRN  promethazine, 12.5 mg, Q6H PRN   Or  promethazine, 12.5 mg, Q6H PRN  sodium chloride flush, 10 mL, ONCE PRN          Electronically signed by Roshni Martinez DO on 8/6/2021 at 2:11 PM

## 2021-08-06 NOTE — PROGRESS NOTES
Occupational Therapy  . Occupational Therapy Treatment Note      Name: Mathieu Solis MRN: [id number] :   1940   Date:  2021   Admission Date: 2021 Room:  [address] [place] [address]-A     Primary Problem:  The primary encounter diagnosis was Fall, initial encounter. Diagnoses of Gastrointestinal hemorrhage, unspecified gastrointestinal hemorrhage type, Low back pain, unspecified back pain laterality, unspecified chronicity, unspecified whether sciatica present, Contusion of left lower extremity, initial encounter, and Skin ulcer of sacrum, limited to breakdown of skin ([facility] [address] were also pertinent to this visit. Restrictions/Precautions:  Restrictions/Precautions  Restrictions/Precautions: General Precautions, Fall Risk     Communication with other providers:  Per chart review and Nurse Rachel, patient is appropriate for therapeutic intervention. Subjective:  Patient states:  \"I'm really tired. They gave me a bath. \" Pt agreeable to OT Tx session. Pain: 0/10, denies pain (location, type, intensity)    Objective:    Observation:  Pt received seated in bedside chair, A&O x3, able to identify year. Objective Measures:  N/A    Treatment, including education:  Therapeutic Exercise:  Cues were given for technique, safety, recruitment, and rationale. Cues were verbal and/or tactile. Pt Sup + vc's for technique to perform BUE AROM exercises to include: shoulder flex/extension, internal/external rotation, chest presses, bicep curls, rowing, and supination/pronation. Required rest breaks between each set to manage activity tolerance. Therapeutic Activity Training:   Therapeutic activity training was instructed today. Cues were given for safety, sequence, UE/LE placement, awareness, and balance. Activities performed today included transfer training for sit<>stands c emphasis on standing tolerance. Scooting: SBA + cues for technique / increased time to scoot to edge of chair.   Mod A x1 c RW for sit to stand from chair, tolerated ~1 minute standing tolerance before identifying need to sit, and performed Min A x1 c RW for stand to sit. Verbal / tactile cues required for safe body positioning / sequencing. All therapeutic intervention performed c emphasis on dynamic balance / standing    tolerance to inc strength, endurance and act tolerance for inc Indep c ADL tasks, func transfers / mobility. Safety  Patient safely in bedside chair + alarm placed end of session, with call light/phone in reach, and nursing aware. Gait belt was used for func transfers.       Assessment / Impression:    Patient's tolerance of treatment: Well  Adverse Reaction: None  Significant change in status and impact:  None  Barriers to improvement: Strength and endurance      Plan for Next Session:    Continue per OT POC per patient's tolerance    Time in:  1012  Time out:  1042  Timed treatment minutes:  30  Total treatment time:  30      Electronically signed by:    TANG Mora  8/6/2021, 10:42 AM    Goals:  Pt goal: go home  Time Frame for STGs: discharge  Goal 1: Pt will perform UE ADLs Supervision  Goal 2: Pt will perform LE ADLs Supervision  Goal 3: Pt will perform toileting Supervision  Goal 4: Pt will perform functional transfer w/ AD Supervision  Goal 5: Pt will perform functional mobility w/ AD Supervision  Goal 6: Pt will perform therex/theract in order to increase functional activity tolerance and dynamic standing balance

## 2021-08-07 ENCOUNTER — APPOINTMENT (OUTPATIENT)
Dept: GENERAL RADIOLOGY | Age: 81
DRG: 378 | End: 2021-08-07
Payer: MEDICARE

## 2021-08-07 ENCOUNTER — APPOINTMENT (OUTPATIENT)
Dept: MRI IMAGING | Age: 81
DRG: 378 | End: 2021-08-07
Payer: MEDICARE

## 2021-08-07 ENCOUNTER — APPOINTMENT (OUTPATIENT)
Dept: CT IMAGING | Age: 81
DRG: 378 | End: 2021-08-07
Payer: MEDICARE

## 2021-08-07 VITALS
HEIGHT: 64 IN | RESPIRATION RATE: 18 BRPM | SYSTOLIC BLOOD PRESSURE: 104 MMHG | WEIGHT: 134.4 LBS | DIASTOLIC BLOOD PRESSURE: 69 MMHG | OXYGEN SATURATION: 96 % | HEART RATE: 75 BPM | BODY MASS INDEX: 22.94 KG/M2 | TEMPERATURE: 98.4 F

## 2021-08-07 LAB
GLUCOSE BLD-MCNC: 151 MG/DL (ref 70–99)
GLUCOSE BLD-MCNC: 195 MG/DL (ref 70–99)
GLUCOSE BLD-MCNC: 203 MG/DL (ref 70–99)
GLUCOSE BLD-MCNC: 203 MG/DL (ref 70–99)
HCT VFR BLD CALC: 26.9 % (ref 37–47)
HCT VFR BLD CALC: 27.4 % (ref 37–47)
HCT VFR BLD CALC: 27.6 % (ref 37–47)
HCT VFR BLD CALC: 28.2 % (ref 37–47)
HEMOGLOBIN: 8.1 GM/DL (ref 12.5–16)
HEMOGLOBIN: 8.1 GM/DL (ref 12.5–16)
HEMOGLOBIN: 8.2 GM/DL (ref 12.5–16)
HEMOGLOBIN: 8.3 GM/DL (ref 12.5–16)
SARS-COV-2, NAAT: NOT DETECTED
SOURCE: NORMAL

## 2021-08-07 PROCEDURE — 6370000000 HC RX 637 (ALT 250 FOR IP): Performed by: HOSPITALIST

## 2021-08-07 PROCEDURE — 94761 N-INVAS EAR/PLS OXIMETRY MLT: CPT

## 2021-08-07 PROCEDURE — C9113 INJ PANTOPRAZOLE SODIUM, VIA: HCPCS | Performed by: SPECIALIST

## 2021-08-07 PROCEDURE — 6360000002 HC RX W HCPCS: Performed by: SPECIALIST

## 2021-08-07 PROCEDURE — 6370000000 HC RX 637 (ALT 250 FOR IP): Performed by: FAMILY MEDICINE

## 2021-08-07 PROCEDURE — 2580000003 HC RX 258: Performed by: SPECIALIST

## 2021-08-07 PROCEDURE — 82962 GLUCOSE BLOOD TEST: CPT

## 2021-08-07 PROCEDURE — 72131 CT LUMBAR SPINE W/O DYE: CPT

## 2021-08-07 PROCEDURE — 72100 X-RAY EXAM L-S SPINE 2/3 VWS: CPT

## 2021-08-07 PROCEDURE — 70551 MRI BRAIN STEM W/O DYE: CPT

## 2021-08-07 PROCEDURE — 6370000000 HC RX 637 (ALT 250 FOR IP): Performed by: SPECIALIST

## 2021-08-07 PROCEDURE — 87635 SARS-COV-2 COVID-19 AMP PRB: CPT

## 2021-08-07 PROCEDURE — 2500000003 HC RX 250 WO HCPCS: Performed by: FAMILY MEDICINE

## 2021-08-07 PROCEDURE — 36415 COLL VENOUS BLD VENIPUNCTURE: CPT

## 2021-08-07 PROCEDURE — 6370000000 HC RX 637 (ALT 250 FOR IP): Performed by: PSYCHIATRY & NEUROLOGY

## 2021-08-07 PROCEDURE — 2140000000 HC CCU INTERMEDIATE R&B

## 2021-08-07 PROCEDURE — 85014 HEMATOCRIT: CPT

## 2021-08-07 PROCEDURE — 85018 HEMOGLOBIN: CPT

## 2021-08-07 RX ORDER — ALPRAZOLAM 0.5 MG/1
0.5 TABLET ORAL NIGHTLY
Qty: 3 TABLET | Refills: 0 | Status: SHIPPED | OUTPATIENT
Start: 2021-08-07 | End: 2022-08-07

## 2021-08-07 RX ORDER — TRAZODONE HYDROCHLORIDE 50 MG/1
100 TABLET ORAL NIGHTLY
Status: DISCONTINUED | OUTPATIENT
Start: 2021-08-07 | End: 2021-08-08 | Stop reason: HOSPADM

## 2021-08-07 RX ORDER — MEMANTINE HYDROCHLORIDE 5 MG/1
5 TABLET ORAL DAILY
Status: DISCONTINUED | OUTPATIENT
Start: 2021-08-07 | End: 2021-08-08 | Stop reason: HOSPADM

## 2021-08-07 RX ORDER — BACLOFEN 10 MG/1
5 TABLET ORAL 2 TIMES DAILY
Status: DISCONTINUED | OUTPATIENT
Start: 2021-08-07 | End: 2021-08-08 | Stop reason: HOSPADM

## 2021-08-07 RX ORDER — LIDOCAINE 4 G/G
1 PATCH TOPICAL DAILY
Status: DISCONTINUED | OUTPATIENT
Start: 2021-08-07 | End: 2021-08-08 | Stop reason: HOSPADM

## 2021-08-07 RX ORDER — CARVEDILOL 3.12 MG/1
1.6 TABLET ORAL 2 TIMES DAILY WITH MEALS
Qty: 60 TABLET | Refills: 3
Start: 2021-08-08

## 2021-08-07 RX ORDER — CLOPIDOGREL BISULFATE 75 MG/1
75 TABLET ORAL DAILY
Qty: 30 TABLET | Refills: 3
Start: 2021-08-08

## 2021-08-07 RX ORDER — FERROUS GLUCONATE 324(37.5)
324 TABLET ORAL EVERY OTHER DAY
Qty: 60 TABLET | Refills: 1 | Status: SHIPPED | OUTPATIENT
Start: 2021-08-07

## 2021-08-07 RX ORDER — ALPRAZOLAM 0.5 MG/1
0.5 TABLET ORAL DAILY PRN
Qty: 1 TABLET | Refills: 0
Start: 2021-08-07 | End: 2021-09-06

## 2021-08-07 RX ORDER — DULOXETIN HYDROCHLORIDE 30 MG/1
90 CAPSULE, DELAYED RELEASE ORAL DAILY
Status: DISCONTINUED | OUTPATIENT
Start: 2021-08-08 | End: 2021-08-08 | Stop reason: HOSPADM

## 2021-08-07 RX ORDER — LIDOCAINE 4 G/G
1 PATCH TOPICAL DAILY
Qty: 1 PATCH | Refills: 0
Start: 2021-08-08

## 2021-08-07 RX ORDER — MEMANTINE HYDROCHLORIDE 5 MG/1
5 TABLET ORAL DAILY
Qty: 60 TABLET | Refills: 3
Start: 2021-08-08

## 2021-08-07 RX ADMIN — SODIUM CHLORIDE, PRESERVATIVE FREE 10 ML: 5 INJECTION INTRAVENOUS at 08:16

## 2021-08-07 RX ADMIN — MEMANTINE HYDROCHLORIDE 5 MG: 5 TABLET ORAL at 17:08

## 2021-08-07 RX ADMIN — TRAZODONE HYDROCHLORIDE 100 MG: 50 TABLET ORAL at 20:29

## 2021-08-07 RX ADMIN — CARVEDILOL 3.12 MG: 3.12 TABLET, FILM COATED ORAL at 08:16

## 2021-08-07 RX ADMIN — BUSPIRONE HYDROCHLORIDE 20 MG: 10 TABLET ORAL at 20:29

## 2021-08-07 RX ADMIN — Medication 1 TABLET: at 17:08

## 2021-08-07 RX ADMIN — DULOXETINE HYDROCHLORIDE 60 MG: 30 CAPSULE, DELAYED RELEASE ORAL at 08:15

## 2021-08-07 RX ADMIN — Medication 1 TABLET: at 08:16

## 2021-08-07 RX ADMIN — CARVEDILOL 3.12 MG: 3.12 TABLET, FILM COATED ORAL at 17:11

## 2021-08-07 RX ADMIN — PANTOPRAZOLE SODIUM 40 MG: 40 INJECTION, POWDER, FOR SOLUTION INTRAVENOUS at 01:45

## 2021-08-07 RX ADMIN — BACLOFEN 5 MG: 10 TABLET ORAL at 20:29

## 2021-08-07 RX ADMIN — BUSPIRONE HYDROCHLORIDE 20 MG: 10 TABLET ORAL at 08:15

## 2021-08-07 RX ADMIN — DONEPEZIL HYDROCHLORIDE 5 MG: 5 TABLET, FILM COATED ORAL at 20:29

## 2021-08-07 RX ADMIN — CLOPIDOGREL BISULFATE 75 MG: 75 TABLET ORAL at 08:16

## 2021-08-07 RX ADMIN — BACLOFEN 5 MG: 10 TABLET ORAL at 17:08

## 2021-08-07 RX ADMIN — ACETAMINOPHEN 650 MG: 325 TABLET ORAL at 08:25

## 2021-08-07 RX ADMIN — MICONAZOLE NITRATE: 2 POWDER TOPICAL at 08:15

## 2021-08-07 RX ADMIN — SODIUM CHLORIDE, PRESERVATIVE FREE 10 ML: 5 INJECTION INTRAVENOUS at 20:29

## 2021-08-07 RX ADMIN — MICONAZOLE NITRATE: 2 POWDER TOPICAL at 20:35

## 2021-08-07 RX ADMIN — Medication 1 TABLET: at 20:29

## 2021-08-07 RX ADMIN — BUSPIRONE HYDROCHLORIDE 20 MG: 10 TABLET ORAL at 17:08

## 2021-08-07 RX ADMIN — PANTOPRAZOLE SODIUM 40 MG: 40 INJECTION, POWDER, FOR SOLUTION INTRAVENOUS at 17:07

## 2021-08-07 ASSESSMENT — PAIN DESCRIPTION - PAIN TYPE: TYPE: CHRONIC PAIN

## 2021-08-07 ASSESSMENT — PAIN SCALES - GENERAL
PAINLEVEL_OUTOF10: 0
PAINLEVEL_OUTOF10: 3
PAINLEVEL_OUTOF10: 5

## 2021-08-07 ASSESSMENT — PAIN DESCRIPTION - LOCATION: LOCATION: BACK

## 2021-08-07 NOTE — PROGRESS NOTES
NEUROLOGY NOTE  DR. Lorenzo Jacob MD.  -------------------------------------------------  Subjective:    Doing better. Denies any new symptoms. Denies headache nausea vomiting dizziness    Denies numbness weakness extremities    Denies blurring of vision double vision    Objective:    /70   Pulse 119   Temp 97.9 °F (36.6 °C) (Oral)   Resp 16   Ht 5' 4\" (1.626 m)   Wt 134 lb 6.4 oz (61 kg)   SpO2 95%   BMI 23.07 kg/m²   HEENT nl      Neuro exam    Alert Oriented  X 3  Follow simple commands  Moderate dementia  EOMI Pupils 3 mm kati  5/5 all 4 extremities      RADIOLOGY  -----------------    ECHO Complete 2D W Doppler W Color    Result Date: 2021  Transthoracic Echocardiography Report (TTE)  Demographics   Patient Name       Morgan Colin     Date of Study       2021   Date of Birth      1940         Gender              Female   Age                78 year(s)         Race                   Patient Number     [id number]         Room Number         3108   Visit Number       [id number]   Corporate ID       [id number]   Accession Number   [id number]         [facility]   Ordering Physician Greyson Bernal MD       Physician           Carlotta HUMPHRIES  Procedure Type of Study   TTE procedure:ECHOCARDIOGRAM COMPLETE 2D W DOPPLER W COLOR. Procedure Date Date: 2021 Start: 08:14 AM Study Location: Portable Technical Quality: Fair visualization Indications:CVA. Patient Status: Routine Height: 64 inches Weight: 134 pounds BSA: 1.65 m2 BMI: 23 kg/m2 HR: 115 bpm BP: 120/61 mmHg  Conclusions   Summary  Left ventricular systolic function is hyperdynamic. Ejection fraction is visually estimated at 55-60%. Heavily calcified aortic valve with severe aortic stenosis; mean P  mmHG. CASEY: 0.61 cm sq. DVI: 0.2. Peak velocity: 465 cm/s. Mild to moderate mitral regurgitation.   No evidence of any pericardial effusion. Left pleural effusion. FU IN [facility]   Signature   ------------------------------------------------------------------  Electronically signed by Marques Potter MD  (Interpreting physician) on 2021 at 11:41 AM  ------------------------------------------------------------------   Findings   Left Ventricle  Left ventricular systolic function is hyperdynamic. Ejection fraction is visually estimated at 55-60%. No regional wall motion abnormalites. Indeterminate diastolic function; E/A flow reversal is noted. No regional wall motion abnormalites. Left Atrium  Mildly dilated left atrium. Right Atrium  Essentially normal right atrium. Right Ventricle  Essentially normal right ventricle. Aortic Valve  Heavily calcified aortic valve with severe aortic stenosis; mean P  mmHG. CASEY: 0.61 cm sq. DVI: 0.2. Peak velocity: 465 cm/s. Mitral Valve  Mild to moderate mitral regurgitation. Tricuspid Valve  Trace tricuspid regurgitation. Pulmonic Valve  The pulmonic valve was not well visualized. Pericardial Effusion  No evidence of any pericardial effusion. Pleural Effusion  Left pleural effusion. Miscellaneous  The aorta is within normal limits.   M-Mode/2D Measurements & Calculations   LV Diastolic Dimension:  LV Systolic Dimension:  LA Dimension: 3.2 cmAO Root  5.17 cm                  3.48 cm                 Dimension: 2.8 cmLA Area:  LV FS:32.7 %             LV Volume Diastolic: 61 89.2 cm2  LV PW Diastolic: 5.55 cm ml  LV PW Systolic: 9.64 cm  LV Volume Systolic: 23  Septum Diastolic: 2.68   ml  cm                       LV EDV/LV EDV Index: 61 RV Diastolic Dimension:  Septum Systolic: 8.84 cm YJ/92 V6EI ESV/LV ESV   2.24 cm  CO: 6.49 l/min           Index: 23 ml/14 m2  CI: 3.93 l/m*m2          EF Calculated (A4C):    LA/Aorta: 1.14                           62.3 %  LV Area Diastolic: 34.7  EF Calculated (2D):     LA volume/Index: 40 ml  cm2 60.8 %                  /70G2  LV Area Systolic: 61.0  cm2                      LV Length: 6.85 cm                            LVOT: 1.8 cm  Doppler Measurements & Calculations   MV Peak E-Wave: 84.5    AV Peak Velocity: 455 cm/s   LVOT Peak Velocity:  cm/s                    AV Peak Gradient: 82.81 mmHg 81.8 cm/s  MV Peak A-Wave: 127     AV Mean Velocity: 366 cm/s   LVOT Mean Velocity:  cm/s                    AV Mean Gradient: 56 mmHg    63.4 cm/s  MV E/A Ratio: 0.67      AV VTI: 86 cm                LVOT Peak Gradient: 3  MV Peak Gradient: 2.86  AV Area (Continuity):0.66    mmHgLVOT Mean Gradient:  mmHg                    cm2                          2 mmHg   MV P1/2t: 56 msec       LVOT VTI: 22.2 cm  MVA by PHT:3.93 cm2   MV E' Septal Velocity:  4.83 cm/s  MV E' Lateral Velocity:  11.6 cm/s  MV E/E' septal: 17.49  MV E/E' lateral: 7.28      XR PELVIS (1-2 VIEWS)    Result Date: 8/2/2021  EXAMINATION: ONE XRAY VIEW OF THE PELVIS 8/2/2021 12:19 pm COMPARISON: Pelvis 11/11/2017 HISTORY: ORDERING SYSTEM PROVIDED HISTORY: trauma TECHNOLOGIST PROVIDED HISTORY: Reason for exam:->trauma Reason for Exam: pain Acuity: Acute Type of Exam: Initial Mechanism of Injury: fell FINDINGS: No acute fracture. Osteophyte formation at the hip joints. Soft tissue structures are unremarkable. Degenerative changes in the lower lumbar spine. No acute fracture. XR TIBIA FIBULA LEFT (2 VIEWS)    Result Date: 8/2/2021  EXAMINATION: 2 XRAY VIEWS OF THE LEFT TIBIA AND FIBULA 8/2/2021 12:19 pm COMPARISON: None. HISTORY: ORDERING SYSTEM PROVIDED HISTORY: leg pain TECHNOLOGIST PROVIDED HISTORY: Reason for exam:->leg pain Reason for Exam: abrasion Acuity: Acute Type of Exam: Initial Mechanism of Injury: fell FINDINGS: The tibia and fibula are intact. No acute fracture. Lytic or destructive lesion. Mild degenerative changes in the knee. No acute abnormality. No radiopaque foreign body.      CT HEAD WO CONTRAST    Result Date: 8/2/2021  EXAMINATION: CT OF THE HEAD WITHOUT CONTRAST  8/2/2021 11:40 am TECHNIQUE: CT of the head was performed without the administration of intravenous contrast. Dose modulation, iterative reconstruction, and/or weight based adjustment of the mA/kV was utilized to reduce the radiation dose to as low as reasonably achievable. COMPARISON: 05/25/2020 HISTORY: ORDERING SYSTEM PROVIDED HISTORY: fall TECHNOLOGIST PROVIDED HISTORY: Reason for exam:->fall Has a \"code stroke\" or \"stroke alert\" been called? ->No Decision Support Exception - unselect if not a suspected or confirmed emergency medical condition->Emergency Medical Condition (MA) Reason for Exam: fall Acuity: Acute Type of Exam: Initial Mechanism of Injury: fall Relevant Medical/Surgical History: none FINDINGS: BRAIN/VENTRICLES: There is no acute intracranial hemorrhage, mass effect or midline shift. No abnormal extra-axial fluid collection. The gray-white differentiation is maintained without evidence of an acute infarct. There is no evidence of hydrocephalus. ORBITS: The visualized portion of the orbits demonstrate no acute abnormality. SINUSES: The visualized paranasal sinuses and mastoid air cells demonstrate no acute abnormality. SOFT TISSUES/SKULL:  No acute abnormality of the visualized skull or soft tissues. No acute intracranial abnormality. CT CERVICAL SPINE WO CONTRAST    Result Date: 8/2/2021  EXAMINATION: CT OF THE CERVICAL SPINE WITHOUT CONTRAST 8/2/2021 11:39 am TECHNIQUE: CT of the cervical spine was performed without the administration of intravenous contrast. Multiplanar reformatted images are provided for review. Dose modulation, iterative reconstruction, and/or weight based adjustment of the mA/kV was utilized to reduce the radiation dose to as low as reasonably achievable. COMPARISON: None.  HISTORY: ORDERING SYSTEM PROVIDED HISTORY: fall TECHNOLOGIST PROVIDED HISTORY: Reason for exam:->fall Decision Support Exception - unselect if not a suspected or confirmed emergency medical condition->Emergency Medical Condition (MA) Reason for Exam: fall Acuity: Acute Type of Exam: Initial Mechanism of Injury: fall Relevant Medical/Surgical History: none FINDINGS: BONES/ALIGNMENT: There is no acute fracture or traumatic malalignment. DEGENERATIVE CHANGES: There is multilevel degenerative disease involving the cervical spine. SOFT TISSUES: There is no prevertebral soft tissue swelling. 1. No acute traumatic abnormality involving the cervical spine. CT THORACIC SPINE WO CONTRAST    Result Date: 8/2/2021  EXAMINATION: CT OF THE THORACIC SPINE WITHOUT CONTRAST; CT OF THE LUMBAR SPINE WITHOUT CONTRAST 8/2/2021 TECHNIQUE: CT of the thoracic spine was performed without the administration of intravenous contrast. Multiplanar reformatted images are provided for review. Dose modulation, iterative reconstruction, and/or weight based adjustment of the mA/kV was utilized to reduce the radiation dose to as low as reasonably achievable.; CT of the lumbar spine was performed without the administration of intravenous contrast. Multiplanar reformatted images are provided for review. Dose modulation, iterative reconstruction, and/or weight based adjustment of the mA/kV was utilized to reduce the radiation dose to as low as reasonably achievable. COMPARISON: 02/21/2020.  HISTORY: ORDERING SYSTEM PROVIDED HISTORY: fall TECHNOLOGIST PROVIDED HISTORY: Reason for exam:->fall Reason for Exam: fall, back pain Acuity: Acute Type of Exam: Initial Mechanism of Injury: fall Relevant Medical/Surgical History: none; ORDERING SYSTEM PROVIDED HISTORY: LOWER BACK PAIN TECHNOLOGIST PROVIDED HISTORY: Reason for exam:->fall Decision Support Exception - unselect if not a suspected or confirmed emergency medical condition->Emergency Medical Condition (MA) Reason for Exam: fall, back pain Acuity: Acute Type of Exam: Initial Mechanism of Injury: falll Relevant Medical/Surgical History: none FINDINGS: BONES/ALIGNMENT: There is no acute fracture or traumatic malalignment. There are old healed right rib fractures. There is a superior endplate fracture of L2 which is not changed significantly compared to the prior study. There is slight more narrowing in the central portion of the vertebral body. DEGENERATIVE CHANGES: No significant degenerative changes of the thoracic or lumbar spine. SOFT TISSUES: No paraspinal mass is seen. There is a calcified gallstone within the gallbladder. 1. No acute traumatic abnormality involving the thoracic or lumbar spine. 2. Chronic superior endplate fracture of L2. 3. Cholelithiasis. CT LUMBAR SPINE WO CONTRAST    Result Date: 8/2/2021  EXAMINATION: CT OF THE THORACIC SPINE WITHOUT CONTRAST; CT OF THE LUMBAR SPINE WITHOUT CONTRAST 8/2/2021 TECHNIQUE: CT of the thoracic spine was performed without the administration of intravenous contrast. Multiplanar reformatted images are provided for review. Dose modulation, iterative reconstruction, and/or weight based adjustment of the mA/kV was utilized to reduce the radiation dose to as low as reasonably achievable.; CT of the lumbar spine was performed without the administration of intravenous contrast. Multiplanar reformatted images are provided for review. Dose modulation, iterative reconstruction, and/or weight based adjustment of the mA/kV was utilized to reduce the radiation dose to as low as reasonably achievable. COMPARISON: 02/21/2020.  HISTORY: ORDERING SYSTEM PROVIDED HISTORY: fall TECHNOLOGIST PROVIDED HISTORY: Reason for exam:->fall Reason for Exam: fall, back pain Acuity: Acute Type of Exam: Initial Mechanism of Injury: fall Relevant Medical/Surgical History: none; ORDERING SYSTEM PROVIDED HISTORY: LOWER BACK PAIN TECHNOLOGIST PROVIDED HISTORY: Reason for exam:->fall Decision Support Exception - unselect if not a suspected or confirmed emergency medical condition->Emergency Medical Condition (MA) Reason for Exam: fall, back pain Acuity: Acute Type of Exam: Initial Mechanism of Injury: falll Relevant Medical/Surgical History: none FINDINGS: BONES/ALIGNMENT: There is no acute fracture or traumatic malalignment. There are old healed right rib fractures. There is a superior endplate fracture of L2 which is not changed significantly compared to the prior study. There is slight more narrowing in the central portion of the vertebral body. DEGENERATIVE CHANGES: No significant degenerative changes of the thoracic or lumbar spine. SOFT TISSUES: No paraspinal mass is seen. There is a calcified gallstone within the gallbladder. 1. No acute traumatic abnormality involving the thoracic or lumbar spine. 2. Chronic superior endplate fracture of L2. 3. Cholelithiasis. CT ABDOMEN PELVIS W IV CONTRAST Additional Contrast? None    Result Date: 8/2/2021  EXAMINATION: CT OF THE ABDOMEN AND PELVIS WITH CONTRAST 8/2/2021 3:31 pm TECHNIQUE: CT of the abdomen and pelvis was performed with the administration of intravenous contrast. Multiplanar reformatted images are provided for review. Dose modulation, iterative reconstruction, and/or weight based adjustment of the mA/kV was utilized to reduce the radiation dose to as low as reasonably achievable. COMPARISON: 02/21/2020 HISTORY: ORDERING SYSTEM PROVIDED HISTORY: fall/ acute anemia. r/o any occult bld loss TECHNOLOGIST PROVIDED HISTORY: Reason for exam:->fall/ acute anemia. r/o any occult bld loss Additional Contrast?->None Decision Support Exception - unselect if not a suspected or confirmed emergency medical condition->Emergency Medical Condition (MA) Reason for Exam: fall/ acute anemia. r/o any occult bld loss Acuity: Acute Type of Exam: Initial Relevant Medical/Surgical History: HX BACK SURG, HYSTER FINDINGS: Lower Chest: Visualized portions of the lower thorax are unremarkable. Organs: Cirrhosis. Spleen, adrenal glands, and kidneys within normal limits. Atrophic pancreas. Somewhat distended gallbladder with couple radiodense gallstones. GI/Bowel: No bowel obstruction. Normal caliber appendix. Pelvis: Urinary bladder unremarkable. Normal sized prostate gland. Peritoneum/Retroperitoneum: No free air or lymphadenopathy. Trace upper abdominal ascites. Esophageal varices are seen. Bones/Soft Tissues: No acute osseous abnormality. Chronic appearing superior endplate deformity of L2. No CT evidence of acute intra-abdominal process. Cirrhosis and esophageal varices. Cholelithiasis with moderate gallbladder distention. XR CHEST PORTABLE    Result Date: 8/3/2021  EXAMINATION: ONE XRAY VIEW OF THE CHEST 8/2/2021 12:19 pm COMPARISON: 05/23/2020 HISTORY: ORDERING SYSTEM PROVIDED HISTORY: fall TECHNOLOGIST PROVIDED HISTORY: Reason for exam:->fall Reason for Exam: pain Acuity: Acute Type of Exam: Initial Mechanism of Injury: fell FINDINGS: Monitor wires overlie the chest. The trachea is midline. The cardiac silhouette is unremarkable. The lungs are clear without evidence of infiltrate, mass, or pneumothorax. There is no pleural fluid. There is atherosclerotic calcification of the aortic knob. No acute cardiopulmonary process. VL DUP CAROTID BILATERAL    Result Date: 8/6/2021  EXAMINATION: ULTRASOUND EVALUATION OF THE CAROTID ARTERIES 8/5/2021 COMPARISON: CT C-spine dated August 2, 2021 HISTORY: ORDERING SYSTEM PROVIDED HISTORY: r/o carotid stenosis TECHNOLOGIST PROVIDED HISTORY: Reason for exam:->r/o carotid stenosis Reason for Exam: syncope Acuity: Acute Type of Exam: Initial FINDINGS: RIGHT: The right common carotid artery demonstrates peak systolic velocities of 85, 70 cm/sec in the proximal and distal segments respectively. The right internal carotid artery demonstrates the systolic velocities of 47, 50, 93 cm/sec in the proximal, mid and distal segments respectively. The external carotid artery is patent. The vertebral artery demonstrates normal antegrade flow.  No evidence of focal atherosclerotic plaque. ICA/CCA ratio of 1.1. LEFT: The left common carotid artery demonstrates peak systolic velocities of 010, 94 cm/sec in the proximal and distal segments respectively. The left internal carotid artery demonstrates the systolic velocities of 52, 53, 66 cm/sec in the proximal, mid and distal segments respectively. The external carotid artery is patent. The vertebral artery demonstrates normal antegrade flow. No evidence of focal atherosclerotic plaque. ICA/CCA ratio of 0.5. The right internal carotid artery demonstrates 0-50% stenosis. The left internal carotid artery demonstrates 0-50% stenosis. Bilateral vertebral arteries are patent with flow in the normal direction.        LAB RESULTS  --------------------    Recent Results (from the past 24 hour(s))   Hemoglobin and Hematocrit, Blood    Collection Time: 08/05/21 10:01 PM   Result Value Ref Range    Hemoglobin 8.9 (L) 12.5 - 16.0 GM/DL    Hematocrit 28.4 (L) 37 - 47 %   Hemoglobin and Hematocrit, Blood    Collection Time: 08/06/21  1:18 AM   Result Value Ref Range    Hemoglobin 8.8 (L) 12.5 - 16.0 GM/DL    Hematocrit 28.6 (L) 37 - 47 %   POCT Glucose    Collection Time: 08/06/21  6:30 AM   Result Value Ref Range    POC Glucose 191 (H) 70 - 99 MG/DL   POCT Glucose    Collection Time: 08/06/21 11:16 AM   Result Value Ref Range    POC Glucose 203 (H) 70 - 99 MG/DL   Basic Metabolic Panel w/ Reflex to MG    Collection Time: 08/06/21 12:13 PM   Result Value Ref Range    Sodium 133 (L) 135 - 145 MMOL/L    Potassium 4.2 3.5 - 5.1 MMOL/L    Chloride 98 (L) 99 - 110 mMol/L    CO2 25 21 - 32 MMOL/L    Anion Gap 10 4 - 16    BUN 8 6 - 23 MG/DL    CREATININE 0.7 0.6 - 1.1 MG/DL    Glucose 288 (H) 70 - 99 MG/DL    Calcium 8.4 8.3 - 10.6 MG/DL    GFR Non-African American >60 >60 mL/min/1.73m2    GFR African American >60 >60 mL/min/1.73m2   CBC auto differential    Collection Time: 08/06/21 12:13 PM   Result Value Ref Range    WBC 7.1 4.0 - 10.5 K/CU MM    RBC 3.49 (L) 4.2 - 5.4 M/CU MM    Hemoglobin 9.1 (L) 12.5 - 16.0 GM/DL    Hematocrit 31.4 (L) 37 - 47 %    MCV 90.0 78 - 100 FL    MCH 26.1 (L) 27 - 31 PG    MCHC 29.0 (L) 32.0 - 36.0 %    RDW 16.2 (H) 11.7 - 14.9 %    Platelets 686 332 - 419 K/CU MM    MPV 10.0 7.5 - 11.1 FL    Differential Type AUTOMATED DIFFERENTIAL     Segs Relative 70.7 (H) 36 - 66 %    Lymphocytes % 14.5 (L) 24 - 44 %    Monocytes % 11.7 (H) 0 - 4 %    Eosinophils % 2.3 0 - 3 %    Basophils % 0.4 0 - 1 %    Segs Absolute 5.0 K/CU MM    Lymphocytes Absolute 1.0 K/CU MM    Monocytes Absolute 0.8 K/CU MM    Eosinophils Absolute 0.2 K/CU MM    Basophils Absolute 0.0 K/CU MM    Nucleated RBC % 0.0 %    Total Nucleated RBC 0.0 K/CU MM    Total Immature Neutrophil 0.03 K/CU MM    Immature Neutrophil % 0.4 0 - 0.43 %   Homocysteine, Serum    Collection Time: 08/06/21 12:13 PM   Result Value Ref Range    Homocysteine 10.7 (H) 0 - 10 umol/L   Vitamin B12 & Folate    Collection Time: 08/06/21 12:13 PM   Result Value Ref Range    Vitamin B-12 1440 (H) 211 - 911 pg/ml    Folate 13.2 3.1 - 17.5 NG/ML   TSH without Reflex    Collection Time: 08/06/21 12:13 PM   Result Value Ref Range    TSH, High Sensitivity 1.030 0.270 - 4.20 uIu/ml   POCT Glucose    Collection Time: 08/06/21  4:09 PM   Result Value Ref Range    POC Glucose 176 (H) 70 - 99 MG/DL   Hemoglobin and Hematocrit, Blood    Collection Time: 08/06/21  8:09 PM   Result Value Ref Range    Hemoglobin 8.5 (L) 12.5 - 16.0 GM/DL    Hematocrit 27.5 (L) 37 - 47 %   Basic Metabolic Panel w/ Reflex to MG    Collection Time: 08/06/21  8:09 PM   Result Value Ref Range    Sodium 133 (L) 135 - 145 MMOL/L    Potassium 4.3 3.5 - 5.1 MMOL/L    Chloride 100 99 - 110 mMol/L    CO2 27 21 - 32 MMOL/L    Anion Gap 6 4 - 16    BUN 8 6 - 23 MG/DL    CREATININE 0.6 0.6 - 1.1 MG/DL    Glucose 205 (H) 70 - 99 MG/DL    Calcium 8.4 8.3 - 10.6 MG/DL    GFR Non-African American >60 >60 mL/min/1.73m2    GFR African American >60 >60 mL/min/1.73m2   POCT Glucose    Collection Time: 08/06/21  8:17 PM   Result Value Ref Range    POC Glucose 219 (H) 70 - 99 MG/DL         Medical problems    Patient Active Problem List:     Dizziness     Difficulty swallowing     PVC (premature ventricular contraction)     Diabetes mellitus (HCC)     Hypertension     Hyperlipemia     Moderate episode of recurrent major depressive disorder (HCC)     Pain of right lower extremity     Hematoma of right thigh     Primary osteoarthritis of both knees     Uncontrolled pain     Chest pain     Generalized anxiety disorder     Psychophysiological insomnia     Severe aortic stenosis     Moderate malnutrition (HCC)     GI bleed     Fall     Cirrhosis of liver without ascites (HCC)     VHD (valvular heart disease)     AVM (arteriovenous malformation) of colon     Benign neoplasm of ascending colon     Benign neoplasm of sigmoid colon      ASSESSMENT:  ---------------------    TIA r/o cardio carotid embolic event     ?GI BLeed with 3 AVMs     Major Neurocognitive disorder of moderate degree     PLAN:     CT smooth neg     Mri brain pend    C doppler neg     B 12 folate tSH     Continue Asa      plavix     aricept     Discussed dx prognosis med side effect and above with pt.         Electronically signed by Tha Crow MD on 8/6/2021 at 9:02 PM

## 2021-08-08 RX ORDER — B12/LEVOMEFOLATE CALCIUM/B-6 2-1.13-25
1 TABLET ORAL DAILY
Status: DISCONTINUED | OUTPATIENT
Start: 2021-08-08 | End: 2021-08-08 | Stop reason: HOSPADM

## 2021-08-08 NOTE — PROGRESS NOTES
NEUROLOGY NOTE  DR. Eduarda Smith MD.  -------------------------------------------------  Subjective:    Doing better. Denies any new symptoms. Denies headache nausea vomiting dizziness    Denies numbness weakness extremities    Denies blurring of vision double vision    Objective:    /69   Pulse 75   Temp 98.4 °F (36.9 °C) (Oral)   Resp 18   Ht 5' 4\" (1.626 m)   Wt 134 lb 6.4 oz (61 kg)   SpO2 96%   BMI 23.07 kg/m²   HEENT nl      Neuro exam    Alert Oriented  X 3  Follow simple commands  Moderate dementia  EOMI Pupils 3 mm kati  5/5 all 4 extremities      RADIOLOGY  -----------------    ECHO Complete 2D W Doppler W Color    Result Date: 2021  Transthoracic Echocardiography Report (TTE)  Demographics   Patient Name       Arnaud Adam     Date of Study       2021   Date of Birth      1940         Gender              Female   Age                78 year(s)         Race                   Patient Number     [id number]         Room Number         3108   Visit Number       [id number]   Corporate ID       [id number]   Accession Number   [id number]         Lila Tony   Ordering Physician Aysha Doan MD       Physician           Carlotta HUMPHRIES  Procedure Type of Study   TTE procedure:ECHOCARDIOGRAM COMPLETE 2D W DOPPLER W COLOR. Procedure Date Date: 2021 Start: 08:14 AM Study Location: Portable Technical Quality: Fair visualization Indications:CVA. Patient Status: Routine Height: 64 inches Weight: 134 pounds BSA: 1.65 m2 BMI: 23 kg/m2 HR: 115 bpm BP: 120/61 mmHg  Conclusions   Summary  Left ventricular systolic function is hyperdynamic. Ejection fraction is visually estimated at 55-60%. Heavily calcified aortic valve with severe aortic stenosis; mean P  mmHG. CASEY: 0.61 cm sq. DVI: 0.2. Peak velocity: 465 cm/s. Mild to moderate mitral regurgitation.   No evidence of any pericardial effusion. Left pleural effusion. FU IN [facility]   Signature   ------------------------------------------------------------------  Electronically signed by Shereen Rodriguez MD  (Interpreting physician) on 2021 at 11:41 AM  ------------------------------------------------------------------   Findings   Left Ventricle  Left ventricular systolic function is hyperdynamic. Ejection fraction is visually estimated at 55-60%. No regional wall motion abnormalites. Indeterminate diastolic function; E/A flow reversal is noted. No regional wall motion abnormalites. Left Atrium  Mildly dilated left atrium. Right Atrium  Essentially normal right atrium. Right Ventricle  Essentially normal right ventricle. Aortic Valve  Heavily calcified aortic valve with severe aortic stenosis; mean P  mmHG. CASEY: 0.61 cm sq. DVI: 0.2. Peak velocity: 465 cm/s. Mitral Valve  Mild to moderate mitral regurgitation. Tricuspid Valve  Trace tricuspid regurgitation. Pulmonic Valve  The pulmonic valve was not well visualized. Pericardial Effusion  No evidence of any pericardial effusion. Pleural Effusion  Left pleural effusion. Miscellaneous  The aorta is within normal limits.   M-Mode/2D Measurements & Calculations   LV Diastolic Dimension:  LV Systolic Dimension:  LA Dimension: 3.2 cmAO Root  5.17 cm                  3.48 cm                 Dimension: 2.8 cmLA Area:  LV FS:32.7 %             LV Volume Diastolic: 61 51.8 cm2  LV PW Diastolic: 1.99 cm ml  LV PW Systolic: 5.52 cm  LV Volume Systolic: 23  Septum Diastolic: 1.55   ml  cm                       LV EDV/LV EDV Index: 61 RV Diastolic Dimension:  Septum Systolic: 3.03 cm UB/17 D4QQ ESV/LV ESV   2.24 cm  CO: 6.49 l/min           Index: 23 ml/14 m2  CI: 3.93 l/m*m2          EF Calculated (A4C):    LA/Aorta: 1.14                           62.3 %  LV Area Diastolic: 12.6  EF Calculated (2D):     LA volume/Index: 40 ml  cm2 60.8 %                  /15Q3  LV Area Systolic: 01.4  cm2                      LV Length: 6.85 cm                            LVOT: 1.8 cm  Doppler Measurements & Calculations   MV Peak E-Wave: 84.5    AV Peak Velocity: 455 cm/s   LVOT Peak Velocity:  cm/s                    AV Peak Gradient: 82.81 mmHg 81.8 cm/s  MV Peak A-Wave: 127     AV Mean Velocity: 366 cm/s   LVOT Mean Velocity:  cm/s                    AV Mean Gradient: 56 mmHg    63.4 cm/s  MV E/A Ratio: 0.67      AV VTI: 86 cm                LVOT Peak Gradient: 3  MV Peak Gradient: 2.86  AV Area (Continuity):0.66    mmHgLVOT Mean Gradient:  mmHg                    cm2                          2 mmHg   MV P1/2t: 56 msec       LVOT VTI: 22.2 cm  MVA by PHT:3.93 cm2   MV E' Septal Velocity:  4.83 cm/s  MV E' Lateral Velocity:  11.6 cm/s  MV E/E' septal: 17.49  MV E/E' lateral: 7.28      XR PELVIS (1-2 VIEWS)    Result Date: 8/2/2021  EXAMINATION: ONE XRAY VIEW OF THE PELVIS 8/2/2021 12:19 pm COMPARISON: Pelvis 11/11/2017 HISTORY: ORDERING SYSTEM PROVIDED HISTORY: trauma TECHNOLOGIST PROVIDED HISTORY: Reason for exam:->trauma Reason for Exam: pain Acuity: Acute Type of Exam: Initial Mechanism of Injury: fell FINDINGS: No acute fracture. Osteophyte formation at the hip joints. Soft tissue structures are unremarkable. Degenerative changes in the lower lumbar spine. No acute fracture. XR TIBIA FIBULA LEFT (2 VIEWS)    Result Date: 8/2/2021  EXAMINATION: 2 XRAY VIEWS OF THE LEFT TIBIA AND FIBULA 8/2/2021 12:19 pm COMPARISON: None. HISTORY: ORDERING SYSTEM PROVIDED HISTORY: leg pain TECHNOLOGIST PROVIDED HISTORY: Reason for exam:->leg pain Reason for Exam: abrasion Acuity: Acute Type of Exam: Initial Mechanism of Injury: fell FINDINGS: The tibia and fibula are intact. No acute fracture. Lytic or destructive lesion. Mild degenerative changes in the knee. No acute abnormality. No radiopaque foreign body.      CT HEAD WO CONTRAST    Result Date: 8/2/2021  EXAMINATION: CT OF THE HEAD WITHOUT CONTRAST  8/2/2021 11:40 am TECHNIQUE: CT of the head was performed without the administration of intravenous contrast. Dose modulation, iterative reconstruction, and/or weight based adjustment of the mA/kV was utilized to reduce the radiation dose to as low as reasonably achievable. COMPARISON: 05/25/2020 HISTORY: ORDERING SYSTEM PROVIDED HISTORY: fall TECHNOLOGIST PROVIDED HISTORY: Reason for exam:->fall Has a \"code stroke\" or \"stroke alert\" been called? ->No Decision Support Exception - unselect if not a suspected or confirmed emergency medical condition->Emergency Medical Condition (MA) Reason for Exam: fall Acuity: Acute Type of Exam: Initial Mechanism of Injury: fall Relevant Medical/Surgical History: none FINDINGS: BRAIN/VENTRICLES: There is no acute intracranial hemorrhage, mass effect or midline shift. No abnormal extra-axial fluid collection. The gray-white differentiation is maintained without evidence of an acute infarct. There is no evidence of hydrocephalus. ORBITS: The visualized portion of the orbits demonstrate no acute abnormality. SINUSES: The visualized paranasal sinuses and mastoid air cells demonstrate no acute abnormality. SOFT TISSUES/SKULL:  No acute abnormality of the visualized skull or soft tissues. No acute intracranial abnormality. CT CERVICAL SPINE WO CONTRAST    Result Date: 8/2/2021  EXAMINATION: CT OF THE CERVICAL SPINE WITHOUT CONTRAST 8/2/2021 11:39 am TECHNIQUE: CT of the cervical spine was performed without the administration of intravenous contrast. Multiplanar reformatted images are provided for review. Dose modulation, iterative reconstruction, and/or weight based adjustment of the mA/kV was utilized to reduce the radiation dose to as low as reasonably achievable. COMPARISON: None.  HISTORY: ORDERING SYSTEM PROVIDED HISTORY: fall TECHNOLOGIST PROVIDED HISTORY: Reason for exam:->fall Decision Support Exception - unselect none FINDINGS: BONES/ALIGNMENT: There is no acute fracture or traumatic malalignment. There are old healed right rib fractures. There is a superior endplate fracture of L2 which is not changed significantly compared to the prior study. There is slight more narrowing in the central portion of the vertebral body. DEGENERATIVE CHANGES: No significant degenerative changes of the thoracic or lumbar spine. SOFT TISSUES: No paraspinal mass is seen. There is a calcified gallstone within the gallbladder. 1. No acute traumatic abnormality involving the thoracic or lumbar spine. 2. Chronic superior endplate fracture of L2. 3. Cholelithiasis. CT LUMBAR SPINE WO CONTRAST    Result Date: 8/2/2021  EXAMINATION: CT OF THE THORACIC SPINE WITHOUT CONTRAST; CT OF THE LUMBAR SPINE WITHOUT CONTRAST 8/2/2021 TECHNIQUE: CT of the thoracic spine was performed without the administration of intravenous contrast. Multiplanar reformatted images are provided for review. Dose modulation, iterative reconstruction, and/or weight based adjustment of the mA/kV was utilized to reduce the radiation dose to as low as reasonably achievable.; CT of the lumbar spine was performed without the administration of intravenous contrast. Multiplanar reformatted images are provided for review. Dose modulation, iterative reconstruction, and/or weight based adjustment of the mA/kV was utilized to reduce the radiation dose to as low as reasonably achievable. COMPARISON: 02/21/2020.  HISTORY: ORDERING SYSTEM PROVIDED HISTORY: fall TECHNOLOGIST PROVIDED HISTORY: Reason for exam:->fall Reason for Exam: fall, back pain Acuity: Acute Type of Exam: Initial Mechanism of Injury: fall Relevant Medical/Surgical History: none; ORDERING SYSTEM PROVIDED HISTORY: LOWER BACK PAIN TECHNOLOGIST PROVIDED HISTORY: Reason for exam:->fall Decision Support Exception - unselect if not a suspected or confirmed emergency medical condition->Emergency Medical Condition (MA) Reason for Exam: fall, back pain Acuity: Acute Type of Exam: Initial Mechanism of Injury: falll Relevant Medical/Surgical History: none FINDINGS: BONES/ALIGNMENT: There is no acute fracture or traumatic malalignment. There are old healed right rib fractures. There is a superior endplate fracture of L2 which is not changed significantly compared to the prior study. There is slight more narrowing in the central portion of the vertebral body. DEGENERATIVE CHANGES: No significant degenerative changes of the thoracic or lumbar spine. SOFT TISSUES: No paraspinal mass is seen. There is a calcified gallstone within the gallbladder. 1. No acute traumatic abnormality involving the thoracic or lumbar spine. 2. Chronic superior endplate fracture of L2. 3. Cholelithiasis. CT ABDOMEN PELVIS W IV CONTRAST Additional Contrast? None    Result Date: 8/2/2021  EXAMINATION: CT OF THE ABDOMEN AND PELVIS WITH CONTRAST 8/2/2021 3:31 pm TECHNIQUE: CT of the abdomen and pelvis was performed with the administration of intravenous contrast. Multiplanar reformatted images are provided for review. Dose modulation, iterative reconstruction, and/or weight based adjustment of the mA/kV was utilized to reduce the radiation dose to as low as reasonably achievable. COMPARISON: 02/21/2020 HISTORY: ORDERING SYSTEM PROVIDED HISTORY: fall/ acute anemia. r/o any occult bld loss TECHNOLOGIST PROVIDED HISTORY: Reason for exam:->fall/ acute anemia. r/o any occult bld loss Additional Contrast?->None Decision Support Exception - unselect if not a suspected or confirmed emergency medical condition->Emergency Medical Condition (MA) Reason for Exam: fall/ acute anemia. r/o any occult bld loss Acuity: Acute Type of Exam: Initial Relevant Medical/Surgical History: HX BACK SURG, HYSTER FINDINGS: Lower Chest: Visualized portions of the lower thorax are unremarkable. Organs: Cirrhosis. Spleen, adrenal glands, and kidneys within normal limits. Atrophic pancreas. Somewhat distended gallbladder with couple radiodense gallstones. GI/Bowel: No bowel obstruction. Normal caliber appendix. Pelvis: Urinary bladder unremarkable. Normal sized prostate gland. Peritoneum/Retroperitoneum: No free air or lymphadenopathy. Trace upper abdominal ascites. Esophageal varices are seen. Bones/Soft Tissues: No acute osseous abnormality. Chronic appearing superior endplate deformity of L2. No CT evidence of acute intra-abdominal process. Cirrhosis and esophageal varices. Cholelithiasis with moderate gallbladder distention. XR CHEST PORTABLE    Result Date: 8/3/2021  EXAMINATION: ONE XRAY VIEW OF THE CHEST 8/2/2021 12:19 pm COMPARISON: 05/23/2020 HISTORY: ORDERING SYSTEM PROVIDED HISTORY: fall TECHNOLOGIST PROVIDED HISTORY: Reason for exam:->fall Reason for Exam: pain Acuity: Acute Type of Exam: Initial Mechanism of Injury: fell FINDINGS: Monitor wires overlie the chest. The trachea is midline. The cardiac silhouette is unremarkable. The lungs are clear without evidence of infiltrate, mass, or pneumothorax. There is no pleural fluid. There is atherosclerotic calcification of the aortic knob. No acute cardiopulmonary process. VL DUP CAROTID BILATERAL    Result Date: 8/6/2021  EXAMINATION: ULTRASOUND EVALUATION OF THE CAROTID ARTERIES 8/5/2021 COMPARISON: CT C-spine dated August 2, 2021 HISTORY: ORDERING SYSTEM PROVIDED HISTORY: r/o carotid stenosis TECHNOLOGIST PROVIDED HISTORY: Reason for exam:->r/o carotid stenosis Reason for Exam: syncope Acuity: Acute Type of Exam: Initial FINDINGS: RIGHT: The right common carotid artery demonstrates peak systolic velocities of 85, 70 cm/sec in the proximal and distal segments respectively. The right internal carotid artery demonstrates the systolic velocities of 47, 50, 93 cm/sec in the proximal, mid and distal segments respectively. The external carotid artery is patent. The vertebral artery demonstrates normal antegrade flow.  No evidence of focal atherosclerotic plaque. ICA/CCA ratio of 1.1. LEFT: The left common carotid artery demonstrates peak systolic velocities of 065, 94 cm/sec in the proximal and distal segments respectively. The left internal carotid artery demonstrates the systolic velocities of 52, 53, 66 cm/sec in the proximal, mid and distal segments respectively. The external carotid artery is patent. The vertebral artery demonstrates normal antegrade flow. No evidence of focal atherosclerotic plaque. ICA/CCA ratio of 0.5. The right internal carotid artery demonstrates 0-50% stenosis. The left internal carotid artery demonstrates 0-50% stenosis. Bilateral vertebral arteries are patent with flow in the normal direction.        LAB RESULTS  --------------------    Recent Results (from the past 24 hour(s))   Hemoglobin and Hematocrit, Blood    Collection Time: 08/07/21  2:08 AM   Result Value Ref Range    Hemoglobin 8.1 (L) 12.5 - 16.0 GM/DL    Hematocrit 27.4 (L) 37 - 47 %   Hemoglobin and Hematocrit, Blood    Collection Time: 08/07/21  7:15 AM   Result Value Ref Range    Hemoglobin 8.3 (L) 12.5 - 16.0 GM/DL    Hematocrit 27.6 (L) 37 - 47 %   POCT Glucose    Collection Time: 08/07/21  7:54 AM   Result Value Ref Range    POC Glucose 203 (H) 70 - 99 MG/DL   POCT Glucose    Collection Time: 08/07/21 10:51 AM   Result Value Ref Range    POC Glucose 151 (H) 70 - 99 MG/DL   Hemoglobin and Hematocrit, Blood    Collection Time: 08/07/21  1:31 PM   Result Value Ref Range    Hemoglobin 8.1 (L) 12.5 - 16.0 GM/DL    Hematocrit 28.2 (L) 37 - 47 %   POCT Glucose    Collection Time: 08/07/21  5:02 PM   Result Value Ref Range    POC Glucose 203 (H) 70 - 99 MG/DL   POCT Glucose    Collection Time: 08/07/21  7:36 PM   Result Value Ref Range    POC Glucose 195 (H) 70 - 99 MG/DL   Hemoglobin and Hematocrit, Blood    Collection Time: 08/07/21  7:45 PM   Result Value Ref Range    Hemoglobin 8.2 (L) 12.5 - 16.0 GM/DL    Hematocrit 26.9 (L) 37 - 47 % COVID-19, Rapid    Collection Time: 08/07/21  7:45 PM    Specimen: Nasopharyngeal   Result Value Ref Range    Source THROAT     SARS-CoV-2, NAAT NOT DETECTED NOT DETECTED         Medical problems    Patient Active Problem List:     Dizziness     Difficulty swallowing     PVC (premature ventricular contraction)     Diabetes mellitus (HCC)     Hypertension     Hyperlipemia     Moderate episode of recurrent major depressive disorder (HCC)     Pain of right lower extremity     Hematoma of right thigh     Primary osteoarthritis of both knees     Uncontrolled pain     Chest pain     Generalized anxiety disorder     Psychophysiological insomnia     Severe aortic stenosis     Moderate malnutrition (HCC)     GI bleed     Fall     Cirrhosis of liver without ascites (HCC)     VHD (valvular heart disease)     AVM (arteriovenous malformation) of colon     Benign neoplasm of ascending colon     Benign neoplasm of sigmoid colon      ASSESSMENT:  ---------------------    TIA r/o cardio carotid embolic event     ?GI BLeed with 3 AVMs     Major Neurocognitive disorder of moderate degree     PLAN:     CT smooth neg     Mri brain pend    C doppler neg     B 12 folate nl     tSH    Homocysteine level high add foltx     Continue Asa      plavix     aricept     Discussed dx prognosis med side effect and above with pt. DELAYED LATE ENTRY NOTE FOR YESTERDAYS VISIT.     Electronically signed by Mary Magaña MD on 8/8/2021 at 12:16 AM

## 2021-08-08 NOTE — DISCHARGE INSTR - DIET

## 2021-08-17 RX ORDER — B12/LEVOMEFOLATE CALCIUM/B-6 2-1.13-25
1 TABLET ORAL DAILY
Qty: 30 TABLET | Refills: 0 | Status: SHIPPED | OUTPATIENT
Start: 2021-08-17

## 2021-08-17 NOTE — DISCHARGE SUMMARY
Discharge Summary    Name:  Jamar Alcantar /Age/Sex: 1940  (78 y.o. female)   MRN & CSN:  [id number] & [id number] Admission Date/Time: 2021 10:35 AM   Attending:  No att. providers found Discharging Physician: Anselmo Medina MD     HPI:     Per H&P: The patient is a 78 y.o. female with significant past medical history of DM, HTN presents as she had a fall yesterday while going to the bathroom and hit a chair and fell on her back. Did not hit her head. She was on the floor and scooted her self to a chair. No abd pain but did have left leg pain. UPon arrival she had anemia and tachycardic. She did state for past couple of day she had \"normal\" color red stools. Hospital Course:     Jesus Wei is a pleasant 78-year-old who presented to ED on  via EMS after a fall the night prior. In the ED her hemoglobin was found to be 6.6 and on rectal exam in the ED fecal occult blood test came back positive. While here in August 3 she had an EGD which showed small esophageal varices which per GI were not likely to have blood, but still possible. She also had a mild portal hypertensive gastropathy. She does have severe aortic stenosis. She had a cardiology consult and then had a colonoscopy which showed 3 arteriovenous malformations in the ascending colon-all of them were coagulated with APC. She also had 2 polyps removed. At baseline she has some word finding difficulty. , and her daughter lives near her. She has now been discharged to [facility] for skilled care. Problem list    GI bleed  -GI notes below and italics.  -She was already on aspirin at home apparently. Neurology started her on Plavix. I took her off of aspirin because she is high risk for bleeding because of cirrhosis. See endoscopy report as below.     EGD:  IMPRESSION :   1) no fresh or old blood noted  2) small esophageal varices without overlying red  markings- not likely to have bled but possible  3) mild portal hypertensive gastropathy  4) no ulcer     -Etiology of the melena is unclear- rule out right-sided colon lesion and in view of her severe aortic stenosis this could be due to angiodysplasia anywhere in GI tract  - anesthesia was concerned about sedation this morning in light of current tachycardia and the severe aortic stenosis         Suggest:             1) cardiogy consult to see if able to tolerate colonoscopy and colon prep             2) tentatively plan slow bowel prep and colonoscopy Thursday    COLONOSCOPY:  IMPRESSION :   1) 3 AVM's in lower ascending colon- all coagulated with the APC  2) 3 mm polyp removed from the mid-ascending colon  3) 5 mm polyp removed from the sigmoid  4) mild sigmoid diverticulosis  5) small internal hemorrhoids  6) otherwise normal colon     PLAN :   1) iron replacement  2) monitor H/H      Acute blood loss anemia  -She was transfused 2 units while here    Severe aortic stenosis -she needs a TAVR per cardiology  -This problem has been known in the past, recommend follow-up with cardiology    SIRS  -Likely due to GI bleed    Fall at home   -Discharged to SNF for acute rehab    Tachycardia: Carvedilol therapy: She was started on low-dose carvedilol this admission    Other problems    Stage II sacral decubitus ulcer l  -Wound care orders written    Elevated homocysteine level-neurology added Foltx    CT lumbar spine shows chronic superior endplate fracture of [id number] follow-up with PCP for osteoporosis assessment    CT abdomen shows cholelithiasis. She appeared symptomatic today    Chronic back pain with exacerbation  -Patient told me that she has chronic back pain which was worse because she was in bed. Appreciate neurology consult which was originally placed for change in speech. CT lumbar spine ordered by neurology prior to discharge shows no acute pathology    Medication issues: We'll continue Metformin despite her diabetes for now. Follow-up with PCP. We'll continue simvastatin    Change in speech: MRI brain negative for acute finding this admission per neurology. Follow-up outpatient. History of cirrhosis  -This was first mentioned in her chart in 2020, apparently with it was discovered on the CT scan.  -Dr. Deysi Garcia has seen her for this problem in the hospital in May 2020  -Follow-up outpatient    Diabetes mellitus type 2  -She did have one sugar reading of 373 while here, but most of her other readings were 250 or less  -Hemoglobin A1c was 7.5% this admission    History of major depressive disorder as well as history of anxiety  -She normally takes alprazolam half a milligram at bedtime  -Continue her other home meds as below  -Continue risperidone  -Continue duloxetine  -Continue trazodone 50 mg at bedtime    Neurology this admission feels that she has a major neurocognitive disorder of moderate degree. Neurology also mentioned a TIA. Neurology note below:  -Neurology started memantine this admission      IMPRESSION:     TIA r/o cardio carotid embolic event     ? GI BLeed with 3 AVMs     Major Neurocognitive disorder of moderate degree     PLAN:     CT smooth neg     Mri brain     B 12 folate tSH     Continue Asa     Start plavix     aricept    Consults this admission:  IP CONSULT TO CASE MANAGEMENT  IP CONSULT TO GI  IP CONSULT TO HOSPITALIST  IP CONSULT TO CARDIOLOGY  IP CONSULT TO CARDIOLOGY  IP CONSULT TO NEUROLOGY    Discharge Instruction:   Follow up appointments: Gastroenterology  Primary care physician:  within 2 weeks    Diet:  cardiac diet   Activity: activity as tolerated  Disposition: Discharged to:   []Home, []C, [x]SNF, []Acute Rehab, []Hospice   Condition on discharge: Stable    Discharge Medications:    Huey Flora   Home Medication Instructions QVZ:[id number]    Printed on:08/17/21 4227   Medication Information                      ALPRAZolam (XANAX) 0.5 MG tablet  Take 1 tablet by mouth nightly.              ALPRAZolam Tamanna Irene) 12/06/2017    BUN 8 08/06/2021    CREATININE 0.6 08/06/2021    CALCIUM 8.4 08/06/2021     IMAGING:     XR LUMBAR SPINE (2-3 VIEWS) [[id number]] Collected: 08/07/21 1718     Order Status: Completed Updated: 08/07/21 1725     Narrative:       EXAMINATION:   CT OF THE LUMBAR SPINE WITHOUT CONTRAST; THREE XRAY VIEWS OF THE LUMBAR SPINE   8/7/2021     TECHNIQUE:   CT of the lumbar spine was performed without the administration of   intravenous contrast. Multiplanar reformatted images are provided for review. Dose modulation, iterative reconstruction, and/or weight based adjustment of   the mA/kV was utilized to reduce the radiation dose to as low as reasonably   achievable. COMPARISON:   CT lumbar spine 08/02/2021     HISTORY:   ORDERING SYSTEM PROVIDED HISTORY: BACK PAIN   TECHNOLOGIST PROVIDED HISTORY:   Reason for exam:->r/o disc   Reason for Exam: r/o disc; Back Pain   Acuity: Acute   Type of Exam: Initial     CT LUMBAR FINDINGS:   BONES/ALIGNMENT: There is normal alignment of the spine. Stable mild anterior   wedging superior endplate L2. The vertebral body heights are otherwise   maintained. No osseous destructive lesion is seen. DEGENERATIVE CHANGES: No significant degenerative changes of the lumbar   spine. Mild degenerative changes predominate L4-5 and L5-S1. No acquired   lumbar spinal canal stenosis evident. SOFT TISSUES/RETROPERITONEUM: No paraspinal mass is seen. LUMBAR SPINE RADIOGRAPH SERIES FINDINGS:   5 typical lumbar vertebra present maintain normal alignment. Stable mild   anterior wedging superior endplate L2. Mild degenerative changes are again demonstrated predominantly mid and lower   lumbar spine. To the extent of visualization, the sacrum appears unremarkable. Mild,   bilaterally symmetrical SI joint osteoarthritis is noted. Impression:       1. CT LUMBAR SPINE: Stable mild anterior wedging superior endplate L2.   2. No acute lumbar fracture or listhesis.    3. maintain normal alignment. Stable mild   anterior wedging superior endplate L2. Mild degenerative changes are again demonstrated predominantly mid and lower   lumbar spine. To the extent of visualization, the sacrum appears unremarkable. Mild,   bilaterally symmetrical SI joint osteoarthritis is noted. Impression:       1. CT LUMBAR SPINE: Stable mild anterior wedging superior endplate L2.   2. No acute lumbar fracture or listhesis. 3. Mild degenerative changes lumbar spine. No definite acquired lumbar   spinal canal stenosis or disc herniation. Note that disc herniation may not   be visible on CT. If radicular symptoms, then MRI correlation should be   considered. 4. Mild, bilaterally symmetrical SI joint osteoarthritis is noted. 5. LUMBAR SPINE RADIOGRAPH SERIES: Stable mild anterior wedging superior   endplate L2.   6. Mild degenerative changes lumbar spine. 7. No acute lumbar fracture or listhesis. 8. Mild, bilaterally symmetrical SI joint osteoarthritis is noted. XR LUMBAR SPINE (MIN 4 VIEWS) [[id number]] Updated: 08/07/21 1632     Order Status: Canceled      CT LUMBAR SPINE WO CONTRAST [[id number]]      Order Status: Canceled      XR LUMBAR SPINE (MIN 4 VIEWS) [[id number]]      Order Status: Canceled      MRI BRAIN WO CONTRAST [[id number]] Collected: 08/07/21 1006     Order Status: Completed Updated: 08/07/21 1013     Narrative:       EXAMINATION:   MRI OF THE BRAIN WITHOUT CONTRAST  8/7/2021 9:32 am     TECHNIQUE:   Multiplanar multisequence MRI of the brain was performed without the   administration of intravenous contrast.     COMPARISON:   08/02/2021     HISTORY:   ORDERING SYSTEM PROVIDED HISTORY: cva   TECHNOLOGIST PROVIDED HISTORY:   Reason for exam:->cva   Reason for Exam: fall cva     FINDINGS:   INTRACRANIAL STRUCTURES/VENTRICLES: No acute infarction. No mass effect or   midline shift. No acute intracranial hemorrhage.   Diffuse parenchymal volume   loss with enlargement of the ventricles and cerebral sulci. Periventricular,   subcortical, and pontine white matter T2/FLAIR hyperintense signal.  The   sellar/suprasellar regions appear unremarkable. The normal signal voids   within the major intracranial vessels appear maintained. ORBITS: The visualized portion of the orbits demonstrate no acute abnormality. SINUSES: Mucosal thickening the right sphenoid sinus. Remaining paranasal   sinuses and tympanomastoid cavities are clear. BONES/SOFT TISSUES: The bone marrow signal intensity appears normal. The soft   tissues demonstrate no acute abnormality. Impression:       1. No acute intracranial abnormality. No acute infarction. 2. Diffuse parenchymal volume loss and sequela of mild chronic microvascular   ischemic change      VL DUP CAROTID BILATERAL [[id number]] Collected: 08/05/21 2255     Order Status: Completed Updated: 08/06/21 0438     Narrative:       EXAMINATION:   ULTRASOUND EVALUATION OF THE CAROTID ARTERIES     8/5/2021     COMPARISON:   CT C-spine dated August 2, 2021     HISTORY:   ORDERING SYSTEM PROVIDED HISTORY: r/o carotid stenosis   TECHNOLOGIST PROVIDED HISTORY:   Reason for exam:->r/o carotid stenosis   Reason for Exam: syncope   Acuity: Acute   Type of Exam: Initial     FINDINGS:     RIGHT:     The right common carotid artery demonstrates peak systolic velocities of 85,   70 cm/sec in the proximal and distal segments respectively. The right internal carotid artery demonstrates the systolic velocities of 47,   50, 93 cm/sec in the proximal, mid and distal segments respectively. The external carotid artery is patent. The vertebral artery demonstrates   normal antegrade flow. No evidence of focal atherosclerotic plaque. ICA/CCA ratio of 1.1. LEFT:     The left common carotid artery demonstrates peak systolic velocities of 303,   94 cm/sec in the proximal and distal segments respectively.      The left internal carotid artery demonstrates the systolic velocities of 52,   53, 66 cm/sec in the proximal, mid and distal segments respectively. The external carotid artery is patent. The vertebral artery demonstrates   normal antegrade flow. No evidence of focal atherosclerotic plaque. ICA/CCA ratio of 0.5. Impression:       The right internal carotid artery demonstrates 0-50% stenosis. The left internal carotid artery demonstrates 0-50% stenosis. Bilateral vertebral arteries are patent with flow in the normal direction. MRI BRAIN WO CONTRAST [[id number]]      Order Status: Canceled      XR CHEST PORTABLE [[id number]] Collected: 08/02/21 1247     Order Status: Completed Updated: 08/03/21 2152     Narrative:       EXAMINATION:   ONE XRAY VIEW OF THE CHEST     8/2/2021 12:19 pm     COMPARISON:   05/23/2020     HISTORY:   ORDERING SYSTEM PROVIDED HISTORY: fall   TECHNOLOGIST PROVIDED HISTORY:   Reason for exam:->fall   Reason for Exam: pain   Acuity: Acute   Type of Exam: Initial   Mechanism of Injury: fell     FINDINGS:   Monitor wires overlie the chest.     The trachea is midline. The cardiac silhouette is unremarkable. The lungs   are clear without evidence of infiltrate, mass, or pneumothorax. There is no   pleural fluid. There is atherosclerotic calcification of the aortic knob. Impression:       No acute cardiopulmonary process. CT ABDOMEN PELVIS W IV CONTRAST Additional Contrast? None [[id number]] Collected: 08/02/21 1537     Order Status: Completed Updated: 08/02/21 1542     Narrative:       EXAMINATION:   CT OF THE ABDOMEN AND PELVIS WITH CONTRAST 8/2/2021 3:31 pm     TECHNIQUE:   CT of the abdomen and pelvis was performed with the administration of   intravenous contrast. Multiplanar reformatted images are provided for review.    Dose modulation, iterative reconstruction, and/or weight based adjustment of   the mA/kV was utilized to reduce the radiation dose to as low as reasonably   achievable. COMPARISON:   02/21/2020     HISTORY:   ORDERING SYSTEM PROVIDED HISTORY: fall/ acute anemia. r/o any occult bld loss   TECHNOLOGIST PROVIDED HISTORY:   Reason for exam:->fall/ acute anemia. r/o any occult bld loss   Additional Contrast?->None   Decision Support Exception - unselect if not a suspected or confirmed   emergency medical condition->Emergency Medical Condition (MA)   Reason for Exam: fall/ acute anemia. r/o any occult bld loss   Acuity: Acute   Type of Exam: Initial   Relevant Medical/Surgical History: HX BACK SURG, HYSTER     FINDINGS:   Lower Chest: Visualized portions of the lower thorax are unremarkable. Organs: Cirrhosis. Spleen, adrenal glands, and kidneys within normal limits. Atrophic pancreas. Somewhat distended gallbladder with couple radiodense   gallstones. GI/Bowel: No bowel obstruction. Normal caliber appendix. Pelvis: Urinary bladder unremarkable. Normal sized prostate gland. Peritoneum/Retroperitoneum: No free air or lymphadenopathy. Trace upper   abdominal ascites. Esophageal varices are seen. Bones/Soft Tissues: No acute osseous abnormality. Chronic appearing superior   endplate deformity of L2. Impression:       No CT evidence of acute intra-abdominal process. Cirrhosis and esophageal varices. Cholelithiasis with moderate gallbladder distention. XR TIBIA FIBULA LEFT (2 VIEWS) [[id number]] Collected: 08/02/21 1246     Order Status: Completed Updated: 08/02/21 1252     Narrative:       EXAMINATION:   2 XRAY VIEWS OF THE LEFT TIBIA AND FIBULA     8/2/2021 12:19 pm     COMPARISON:   None. HISTORY:   ORDERING SYSTEM PROVIDED HISTORY: leg pain   TECHNOLOGIST PROVIDED HISTORY:   Reason for exam:->leg pain   Reason for Exam: abrasion   Acuity: Acute   Type of Exam: Initial   Mechanism of Injury: fell     FINDINGS:   The tibia and fibula are intact. No acute fracture. Lytic or destructive   lesion.   Mild degenerative changes in the knee. Impression:       No acute abnormality. No radiopaque foreign body. XR PELVIS (1-2 VIEWS) [[id number]] Collected: 08/02/21 1243     Order Status: Completed Updated: 08/02/21 1248     Narrative:       EXAMINATION:   ONE XRAY VIEW OF THE PELVIS     8/2/2021 12:19 pm     COMPARISON:   Pelvis 11/11/2017     HISTORY:   ORDERING SYSTEM PROVIDED HISTORY: trauma   TECHNOLOGIST PROVIDED HISTORY:   Reason for exam:->trauma   Reason for Exam: pain   Acuity: Acute   Type of Exam: Initial   Mechanism of Injury: fell     FINDINGS:   No acute fracture. Osteophyte formation at the hip joints. Soft tissue   structures are unremarkable. Degenerative changes in the lower lumbar spine. Impression:       No acute fracture. CT LUMBAR SPINE WO CONTRAST [[id number]] Collected: 08/02/21 1219     Order Status: Completed Updated: 08/02/21 1230     Narrative:       EXAMINATION:   CT OF THE THORACIC SPINE WITHOUT CONTRAST; CT OF THE LUMBAR SPINE WITHOUT   CONTRAST 8/2/2021     TECHNIQUE:   CT of the thoracic spine was performed without the administration of   intravenous contrast. Multiplanar reformatted images are provided for review. Dose modulation, iterative reconstruction, and/or weight based adjustment of   the mA/kV was utilized to reduce the radiation dose to as low as reasonably   achievable.; CT of the lumbar spine was performed without the administration   of intravenous contrast. Multiplanar reformatted images are provided for   review. Dose modulation, iterative reconstruction, and/or weight based   adjustment of the mA/kV was utilized to reduce the radiation dose to as low   as reasonably achievable. COMPARISON:   02/21/2020.      HISTORY:   ORDERING SYSTEM PROVIDED HISTORY: fall   TECHNOLOGIST PROVIDED HISTORY:   Reason for exam:->fall   Reason for Exam: fall, back pain   Acuity: Acute   Type of Exam: Initial   Mechanism of Injury: fall   Relevant Medical/Surgical History: none; ORDERING SYSTEM PROVIDED HISTORY:   LOWER BACK PAIN   TECHNOLOGIST PROVIDED HISTORY:   Reason for exam:->fall   Decision Support Exception - unselect if not a suspected or confirmed   emergency medical condition->Emergency Medical Condition (MA)   Reason for Exam: fall, back pain   Acuity: Acute   Type of Exam: Initial   Mechanism of Injury: falll   Relevant Medical/Surgical History: none     FINDINGS:   BONES/ALIGNMENT: There is no acute fracture or traumatic malalignment. There   are old healed right rib fractures. There is a superior endplate fracture of   L2 which is not changed significantly compared to the prior study. There is   slight more narrowing in the central portion of the vertebral body. DEGENERATIVE CHANGES: No significant degenerative changes of the thoracic or   lumbar spine. SOFT TISSUES: No paraspinal mass is seen. There is a calcified gallstone   within the gallbladder. Impression:       1. No acute traumatic abnormality involving the thoracic or lumbar spine. 2. Chronic superior endplate fracture of L2.   3. Cholelithiasis. CT THORACIC SPINE WO CONTRAST [[id number]] Collected: 08/02/21 1219     Order Status: Completed Updated: 08/02/21 1230     Narrative:       EXAMINATION:   CT OF THE THORACIC SPINE WITHOUT CONTRAST; CT OF THE LUMBAR SPINE WITHOUT   CONTRAST 8/2/2021     TECHNIQUE:   CT of the thoracic spine was performed without the administration of   intravenous contrast. Multiplanar reformatted images are provided for review. Dose modulation, iterative reconstruction, and/or weight based adjustment of   the mA/kV was utilized to reduce the radiation dose to as low as reasonably   achievable.; CT of the lumbar spine was performed without the administration   of intravenous contrast. Multiplanar reformatted images are provided for   review.  Dose modulation, iterative reconstruction, and/or weight based   adjustment of the mA/kV was utilized to reduce the radiation dose to as low   as reasonably achievable. COMPARISON:   02/21/2020. HISTORY:   ORDERING SYSTEM PROVIDED HISTORY: fall   TECHNOLOGIST PROVIDED HISTORY:   Reason for exam:->fall   Reason for Exam: fall, back pain   Acuity: Acute   Type of Exam: Initial   Mechanism of Injury: fall   Relevant Medical/Surgical History: none; ORDERING SYSTEM PROVIDED HISTORY:   LOWER BACK PAIN   TECHNOLOGIST PROVIDED HISTORY:   Reason for exam:->fall   Decision Support Exception - unselect if not a suspected or confirmed   emergency medical condition->Emergency Medical Condition (MA)   Reason for Exam: fall, back pain   Acuity: Acute   Type of Exam: Initial   Mechanism of Injury: falll   Relevant Medical/Surgical History: none     FINDINGS:   BONES/ALIGNMENT: There is no acute fracture or traumatic malalignment. There   are old healed right rib fractures. There is a superior endplate fracture of   L2 which is not changed significantly compared to the prior study. There is   slight more narrowing in the central portion of the vertebral body. DEGENERATIVE CHANGES: No significant degenerative changes of the thoracic or   lumbar spine. SOFT TISSUES: No paraspinal mass is seen. There is a calcified gallstone   within the gallbladder. Impression:       1. No acute traumatic abnormality involving the thoracic or lumbar spine. 2. Chronic superior endplate fracture of L2.   3. Cholelithiasis. CT HEAD WO CONTRAST [[id number]] Collected: 08/02/21 1206     Order Status: Completed Updated: 08/02/21 1215     Narrative:       EXAMINATION:   CT OF THE HEAD WITHOUT CONTRAST  8/2/2021 11:40 am     TECHNIQUE:   CT of the head was performed without the administration of intravenous   contrast. Dose modulation, iterative reconstruction, and/or weight based   adjustment of the mA/kV was utilized to reduce the radiation dose to as low   as reasonably achievable.      COMPARISON:   05/25/2020     HISTORY:   ORDERING SYSTEM PROVIDED HISTORY: fall   TECHNOLOGIST PROVIDED HISTORY:   Reason for exam:->fall   Has a \"code stroke\" or \"stroke alert\" been called? ->No   Decision Support Exception - unselect if not a suspected or confirmed   emergency medical condition->Emergency Medical Condition (MA)   Reason for Exam: fall   Acuity: Acute   Type of Exam: Initial   Mechanism of Injury: fall   Relevant Medical/Surgical History: none     FINDINGS:   BRAIN/VENTRICLES: There is no acute intracranial hemorrhage, mass effect or   midline shift. No abnormal extra-axial fluid collection. The gray-white   differentiation is maintained without evidence of an acute infarct. There is   no evidence of hydrocephalus. ORBITS: The visualized portion of the orbits demonstrate no acute abnormality. SINUSES: The visualized paranasal sinuses and mastoid air cells demonstrate   no acute abnormality. SOFT TISSUES/SKULL:  No acute abnormality of the visualized skull or soft   tissues. Impression:       No acute intracranial abnormality. CT CERVICAL SPINE WO CONTRAST [[id number]] Collected: 08/02/21 1208     Order Status: Completed Updated: 08/02/21 1213     Narrative:       EXAMINATION:   CT OF THE CERVICAL SPINE WITHOUT CONTRAST 8/2/2021 11:39 am     TECHNIQUE:   CT of the cervical spine was performed without the administration of   intravenous contrast. Multiplanar reformatted images are provided for review. Dose modulation, iterative reconstruction, and/or weight based adjustment of   the mA/kV was utilized to reduce the radiation dose to as low as reasonably   achievable. COMPARISON:   None.      HISTORY:   ORDERING SYSTEM PROVIDED HISTORY: fall   TECHNOLOGIST PROVIDED HISTORY:   Reason for exam:->fall   Decision Support Exception - unselect if not a suspected or confirmed   emergency medical condition->Emergency Medical Condition (MA)   Reason for Exam: fall   Acuity: Acute   Type of Exam: Initial   Mechanism of Injury: fall Relevant Medical/Surgical History: none     FINDINGS:   BONES/ALIGNMENT: There is no acute fracture or traumatic malalignment. DEGENERATIVE CHANGES: There is multilevel degenerative disease involving the   cervical spine. SOFT TISSUES: There is no prevertebral soft tissue swelling. Impression:       1. No acute traumatic abnormality involving the cervical spine.           Discharge Time of 35 minutes    Electronically signed by Melonie Newell MD on 8/17/2021 at 6:54 PM

## 2021-08-31 ENCOUNTER — HOSPITAL ENCOUNTER (OUTPATIENT)
Dept: CARDIAC CATH/INVASIVE PROCEDURES | Age: 81
Discharge: HOME | End: 2021-08-31

## 2022-07-11 NOTE — H&P
I have examined the patient within 24 hours  before the procedure and there is no change in the previous history and physical exam,which has been reviewed. There is no history of sleep apnea, snoring, or stridor. There has been no  previous adverse experience with sedation/anesthesia. There is no increased risk for aspiration of gastric contents. The patient has been instructed that all resuscitative measures (during the operative and immediate perioperative period) will be instituted in the unlikely event that they will be needed. The patient has no pertinent past surgical or FH other than listed in the original H&P.     ASA Class: 3  AIRWAY Class: 1    Consent form signed, witnessed and in soft chart Detail Level: Simple Detail Level: Detailed Detail Level: Generalized

## 2025-04-09 NOTE — PROGRESS NOTES
Regarding: [place] 55 F Dizziness on/off, SOB-wheezing/extreme fatigue/weakness/possible medication related  ----- Message from Maite MATHEW sent at 4/9/2025  8:09 AM CDT -----  Patient Name: Eve Guthrie    Specialist or PCP Name: Alcira Amor    Symptoms: [place] 55 F Dizziness on/off, SOB-wheezing/extreme fatigue/weakness/possible medication related started taking 3 wks FLUoxetine (PROzac) 10 MG capsule/depression-crying/nausea/no appetite     Pregnant (females aged 13-60. If Yes, how long?) : no    Call Back # : [phone]    Which State are you currently located in?: [place]     Name of Clinic Site / Acct# : [facility] - [address]     Call arrived during: Work Hours     Report given to Nurse Evie Gill at [place] [facility]. All questions answered at this time. Patient should be discharging this evening with transport.

## (undated) DEVICE — FIAPC® PROBE W/ FILTER 2200 A OD 2.3MM/6.9FR; L 2.2M/7.2FT: Brand: ERBE

## (undated) DEVICE — SNARE ENDOSCP L240CM SHTH DIA24MM LOOP W10MM POLYP RND REINF

## (undated) DEVICE — Z DISCONTINUED (USE MFG CAT MVABO)  TUBING GAS SAMPLING STD 6.5 FT FEMALE CONN SMRT CAPNOLINE

## (undated) DEVICE — DILATOR ENDOSCP L180CM DIA6FR BLLN L8CM DIA54-60FR

## (undated) DEVICE — Z DISCONTINUED NO SUB IDED TUBING ETCO2 AD L6.5FT NSL ORAL CVD PRNG NONFLARED TIP OVR

## (undated) DEVICE — FORCEPS BX L240CM JAW DIA2.8MM L CAP W/ NDL MIC MESH TOOTH

## (undated) DEVICE — SYRINGE INFL 60ML DISP ALLIANCE II